# Patient Record
Sex: FEMALE | Race: WHITE | Employment: FULL TIME | URBAN - METROPOLITAN AREA
[De-identification: names, ages, dates, MRNs, and addresses within clinical notes are randomized per-mention and may not be internally consistent; named-entity substitution may affect disease eponyms.]

---

## 2017-01-12 PROBLEM — G89.29 CHRONIC LEFT SHOULDER PAIN: Status: ACTIVE | Noted: 2017-01-12

## 2017-01-12 PROBLEM — M25.512 CHRONIC LEFT SHOULDER PAIN: Status: ACTIVE | Noted: 2017-01-12

## 2017-01-12 PROBLEM — M54.2 NECK PAIN: Status: ACTIVE | Noted: 2017-01-12

## 2017-01-12 PROBLEM — M75.80 ROTATOR CUFF TENDINITIS: Status: ACTIVE | Noted: 2017-01-12

## 2017-01-12 PROBLEM — M75.22 BICEPS TENDINITIS ON LEFT: Status: ACTIVE | Noted: 2017-01-12

## 2017-01-12 PROBLEM — S43.432A SUPERIOR GLENOID LABRUM LESION OF LEFT SHOULDER: Status: ACTIVE | Noted: 2017-01-12

## 2017-01-12 PROBLEM — M19.012 ARTHRITIS OF LEFT ACROMIOCLAVICULAR JOINT: Status: ACTIVE | Noted: 2017-01-12

## 2017-02-13 ENCOUNTER — HOSPITAL ENCOUNTER (OUTPATIENT)
Dept: PHYSICAL THERAPY | Age: 52
Discharge: HOME | End: 2017-02-28
Admitting: ORTHOPAEDIC SURGERY

## 2017-02-14 ENCOUNTER — HOSPITAL ENCOUNTER (OUTPATIENT)
Dept: OTHER | Age: 52
Discharge: HOME | End: 2017-02-14
Attending: ORTHOPAEDIC SURGERY | Admitting: ORTHOPAEDIC SURGERY

## 2017-03-01 ENCOUNTER — HOSPITAL ENCOUNTER (OUTPATIENT)
Dept: PHYSICAL THERAPY | Age: 52
Discharge: HOME | End: 2017-03-02
Admitting: ORTHOPAEDIC SURGERY

## 2017-03-08 ENCOUNTER — OFFICE VISIT (OUTPATIENT)
Dept: FAMILY MEDICINE CLINIC | Age: 52
End: 2017-03-08

## 2017-03-08 VITALS
DIASTOLIC BLOOD PRESSURE: 70 MMHG | BODY MASS INDEX: 38.51 KG/M2 | SYSTOLIC BLOOD PRESSURE: 100 MMHG | HEART RATE: 68 BPM | WEIGHT: 239.6 LBS | TEMPERATURE: 98.1 F | HEIGHT: 66 IN

## 2017-03-08 DIAGNOSIS — R42 VERTIGO: Primary | ICD-10-CM

## 2017-03-08 PROCEDURE — 99213 OFFICE O/P EST LOW 20 MIN: CPT | Performed by: FAMILY MEDICINE

## 2017-03-08 RX ORDER — MECLIZINE HYDROCHLORIDE 25 MG/1
25 TABLET ORAL 3 TIMES DAILY PRN
Qty: 12 TABLET | Refills: 0 | Status: SHIPPED | OUTPATIENT
Start: 2017-03-08 | End: 2017-03-18

## 2017-03-08 ASSESSMENT — ENCOUNTER SYMPTOMS
EYE PAIN: 0
ABDOMINAL PAIN: 0
VOMITING: 1
NAUSEA: 1
SHORTNESS OF BREATH: 0

## 2017-03-21 ENCOUNTER — TELEPHONE (OUTPATIENT)
Dept: FAMILY MEDICINE CLINIC | Age: 52
End: 2017-03-21

## 2017-03-21 RX ORDER — METHYLPREDNISOLONE 4 MG/1
TABLET ORAL
Qty: 1 KIT | Refills: 0 | Status: SHIPPED | OUTPATIENT
Start: 2017-03-21 | End: 2017-03-27

## 2017-03-24 ENCOUNTER — HOSPITAL ENCOUNTER (OUTPATIENT)
Dept: PHYSICAL THERAPY | Age: 52
Discharge: HOME | End: 2017-03-25
Admitting: ORTHOPAEDIC SURGERY

## 2017-03-30 PROBLEM — M75.82 TENDINITIS OF LEFT ROTATOR CUFF: Status: ACTIVE | Noted: 2017-01-12

## 2017-04-12 ENCOUNTER — HOSPITAL ENCOUNTER (OUTPATIENT)
Dept: NON INVASIVE DIAGNOSTICS | Age: 52
Discharge: HOME | End: 2017-04-12
Attending: FAMILY MEDICINE | Admitting: FAMILY MEDICINE

## 2017-04-12 ENCOUNTER — OFFICE VISIT (OUTPATIENT)
Dept: FAMILY MEDICINE CLINIC | Age: 52
End: 2017-04-12

## 2017-04-12 VITALS
DIASTOLIC BLOOD PRESSURE: 84 MMHG | WEIGHT: 232.6 LBS | SYSTOLIC BLOOD PRESSURE: 122 MMHG | BODY MASS INDEX: 37.38 KG/M2 | TEMPERATURE: 97.4 F | HEART RATE: 72 BPM | HEIGHT: 66 IN

## 2017-04-12 DIAGNOSIS — E78.00 PURE HYPERCHOLESTEROLEMIA: ICD-10-CM

## 2017-04-12 DIAGNOSIS — S43.432A SUPERIOR GLENOID LABRUM LESION OF LEFT SHOULDER, INITIAL ENCOUNTER: ICD-10-CM

## 2017-04-12 DIAGNOSIS — M19.012 ARTHRITIS OF LEFT ACROMIOCLAVICULAR JOINT: ICD-10-CM

## 2017-04-12 DIAGNOSIS — Z01.818 PREOP EXAMINATION: ICD-10-CM

## 2017-04-12 DIAGNOSIS — Z01.818 PREOP EXAMINATION: Primary | ICD-10-CM

## 2017-04-12 DIAGNOSIS — M75.82 TENDINITIS OF LEFT ROTATOR CUFF: ICD-10-CM

## 2017-04-12 LAB
EKG ATRIAL RATE: 61 BPM
EKG DIAGNOSIS: NORMAL
EKG P AXIS: 29 DEGREES
EKG P-R INTERVAL: 180 MS
EKG Q-T INTERVAL: 428 MS
EKG QRS DURATION: 84 MS
EKG QTC CALCULATION (BAZETT): 430 MS
EKG R AXIS: 21 DEGREES
EKG T AXIS: 26 DEGREES
EKG VENTRICULAR RATE: 61 BPM

## 2017-04-12 PROCEDURE — 99242 OFF/OP CONSLTJ NEW/EST SF 20: CPT | Performed by: FAMILY MEDICINE

## 2017-04-12 ASSESSMENT — ENCOUNTER SYMPTOMS
ABDOMINAL PAIN: 0
SHORTNESS OF BREATH: 0
RHINORRHEA: 0
NAUSEA: 0
EYE PAIN: 0
DIARRHEA: 0
CHEST TIGHTNESS: 0
SINUS PRESSURE: 0
EYE DISCHARGE: 0
VOMITING: 0
COUGH: 0
WHEEZING: 0
CONSTIPATION: 0
BLOOD IN STOOL: 0
EYE REDNESS: 0
COLOR CHANGE: 0
BACK PAIN: 0

## 2017-04-13 ENCOUNTER — TELEPHONE (OUTPATIENT)
Dept: FAMILY MEDICINE CLINIC | Age: 52
End: 2017-04-13

## 2017-04-24 ENCOUNTER — TELEPHONE (OUTPATIENT)
Dept: ORTHOPEDIC SURGERY | Age: 52
End: 2017-04-24

## 2017-04-25 ENCOUNTER — HOSPITAL ENCOUNTER (OUTPATIENT)
Dept: PREADMISSION TESTING | Age: 52
Discharge: HOME | End: 2017-04-25
Admitting: ORTHOPAEDIC SURGERY

## 2017-04-25 VITALS — HEIGHT: 66 IN | WEIGHT: 232 LBS | BODY MASS INDEX: 37.28 KG/M2

## 2017-04-25 RX ORDER — CHLORHEXIDINE GLUCONATE 0.12 MG/ML
15 RINSE ORAL 2 TIMES DAILY
Status: CANCELLED | OUTPATIENT
Start: 2017-04-25

## 2017-05-05 ENCOUNTER — HOSPITAL ENCOUNTER (OUTPATIENT)
Dept: SURGERY | Age: 52
Discharge: HOME | End: 2017-05-05
Admitting: ORTHOPAEDIC SURGERY

## 2017-05-05 VITALS
OXYGEN SATURATION: 98 % | BODY MASS INDEX: 37.28 KG/M2 | WEIGHT: 232 LBS | TEMPERATURE: 97.4 F | RESPIRATION RATE: 20 BRPM | HEART RATE: 64 BPM | SYSTOLIC BLOOD PRESSURE: 121 MMHG | HEIGHT: 66 IN | DIASTOLIC BLOOD PRESSURE: 76 MMHG

## 2017-05-05 DIAGNOSIS — M19.012 OSTEOARTHRITIS OF LEFT GLENOHUMERAL JOINT: ICD-10-CM

## 2017-05-05 LAB — PREGNANCY, URINE: NEGATIVE

## 2017-05-05 RX ORDER — LABETALOL HYDROCHLORIDE 5 MG/ML
5 INJECTION, SOLUTION INTRAVENOUS EVERY 5 MIN PRN
Status: DISCONTINUED | OUTPATIENT
Start: 2017-05-05 | End: 2017-05-06 | Stop reason: HOSPADM

## 2017-05-05 RX ORDER — MIDAZOLAM HYDROCHLORIDE 1 MG/ML
2 INJECTION INTRAMUSCULAR; INTRAVENOUS
Status: COMPLETED | OUTPATIENT
Start: 2017-05-05 | End: 2017-05-05

## 2017-05-05 RX ORDER — FENTANYL CITRATE 50 UG/ML
25 INJECTION, SOLUTION INTRAMUSCULAR; INTRAVENOUS EVERY 5 MIN PRN
Status: DISCONTINUED | OUTPATIENT
Start: 2017-05-05 | End: 2017-05-06 | Stop reason: HOSPADM

## 2017-05-05 RX ORDER — ONDANSETRON 2 MG/ML
4 INJECTION INTRAMUSCULAR; INTRAVENOUS
Status: COMPLETED | OUTPATIENT
Start: 2017-05-05 | End: 2017-05-05

## 2017-05-05 RX ORDER — SODIUM CHLORIDE, SODIUM LACTATE, POTASSIUM CHLORIDE, CALCIUM CHLORIDE 600; 310; 30; 20 MG/100ML; MG/100ML; MG/100ML; MG/100ML
INJECTION, SOLUTION INTRAVENOUS CONTINUOUS
Status: DISCONTINUED | OUTPATIENT
Start: 2017-05-05 | End: 2017-05-06 | Stop reason: HOSPADM

## 2017-05-05 RX ORDER — SODIUM CHLORIDE 0.9 % (FLUSH) 0.9 %
10 SYRINGE (ML) INJECTION PRN
Status: DISCONTINUED | OUTPATIENT
Start: 2017-05-05 | End: 2017-05-06 | Stop reason: HOSPADM

## 2017-05-05 RX ORDER — MEPERIDINE HYDROCHLORIDE 25 MG/ML
12.5 INJECTION INTRAMUSCULAR; INTRAVENOUS; SUBCUTANEOUS EVERY 5 MIN PRN
Status: DISCONTINUED | OUTPATIENT
Start: 2017-05-05 | End: 2017-05-06 | Stop reason: HOSPADM

## 2017-05-05 RX ORDER — HYDRALAZINE HYDROCHLORIDE 20 MG/ML
5 INJECTION INTRAMUSCULAR; INTRAVENOUS EVERY 5 MIN PRN
Status: DISCONTINUED | OUTPATIENT
Start: 2017-05-05 | End: 2017-05-06 | Stop reason: HOSPADM

## 2017-05-05 RX ORDER — SODIUM CHLORIDE 0.9 % (FLUSH) 0.9 %
10 SYRINGE (ML) INJECTION EVERY 12 HOURS SCHEDULED
Status: DISCONTINUED | OUTPATIENT
Start: 2017-05-05 | End: 2017-05-06 | Stop reason: HOSPADM

## 2017-05-05 RX ORDER — PROMETHAZINE HYDROCHLORIDE 25 MG/1
25 TABLET ORAL EVERY 8 HOURS PRN
Qty: 30 TABLET | Refills: 0 | Status: SHIPPED | OUTPATIENT
Start: 2017-05-05 | End: 2017-05-12

## 2017-05-05 RX ORDER — LIDOCAINE HYDROCHLORIDE 10 MG/ML
1 INJECTION, SOLUTION EPIDURAL; INFILTRATION; INTRACAUDAL; PERINEURAL
Status: ACTIVE | OUTPATIENT
Start: 2017-05-05 | End: 2017-05-05

## 2017-05-05 RX ORDER — ROPIVACAINE HYDROCHLORIDE 5 MG/ML
INJECTION, SOLUTION EPIDURAL; INFILTRATION; PERINEURAL
Status: DISPENSED
Start: 2017-05-05 | End: 2017-05-05

## 2017-05-05 RX ORDER — CHLORHEXIDINE GLUCONATE 0.12 MG/ML
15 RINSE ORAL 2 TIMES DAILY
Status: DISCONTINUED | OUTPATIENT
Start: 2017-05-05 | End: 2017-05-06 | Stop reason: HOSPADM

## 2017-05-05 RX ORDER — SCOLOPAMINE TRANSDERMAL SYSTEM 1 MG/1
1 PATCH, EXTENDED RELEASE TRANSDERMAL ONCE
Status: DISCONTINUED | OUTPATIENT
Start: 2017-05-05 | End: 2017-05-06 | Stop reason: HOSPADM

## 2017-05-05 RX ORDER — FENTANYL CITRATE 50 UG/ML
100 INJECTION, SOLUTION INTRAMUSCULAR; INTRAVENOUS
Status: COMPLETED | OUTPATIENT
Start: 2017-05-05 | End: 2017-05-05

## 2017-05-05 RX ORDER — GLYCOPYRROLATE 0.2 MG/ML
0.2 INJECTION INTRAMUSCULAR; INTRAVENOUS ONCE
Status: COMPLETED | OUTPATIENT
Start: 2017-05-05 | End: 2017-05-05

## 2017-05-05 RX ORDER — SCOLOPAMINE TRANSDERMAL SYSTEM 1 MG/1
PATCH, EXTENDED RELEASE TRANSDERMAL
Status: DISCONTINUED
Start: 2017-05-05 | End: 2017-05-06 | Stop reason: HOSPADM

## 2017-05-05 RX ORDER — OXYCODONE HYDROCHLORIDE AND ACETAMINOPHEN 5; 325 MG/1; MG/1
1 TABLET ORAL EVERY 4 HOURS PRN
Qty: 30 TABLET | Refills: 0 | Status: SHIPPED | OUTPATIENT
Start: 2017-05-05 | End: 2017-05-12

## 2017-05-05 RX ORDER — SODIUM CHLORIDE, SODIUM LACTATE, POTASSIUM CHLORIDE, CALCIUM CHLORIDE 600; 310; 30; 20 MG/100ML; MG/100ML; MG/100ML; MG/100ML
125 INJECTION, SOLUTION INTRAVENOUS CONTINUOUS
Status: DISCONTINUED | OUTPATIENT
Start: 2017-05-05 | End: 2017-05-06 | Stop reason: HOSPADM

## 2017-05-05 RX ORDER — METOCLOPRAMIDE HYDROCHLORIDE 5 MG/ML
10 INJECTION INTRAMUSCULAR; INTRAVENOUS ONCE
Status: COMPLETED | OUTPATIENT
Start: 2017-05-05 | End: 2017-05-05

## 2017-05-05 RX ADMIN — ONDANSETRON 4 MG: 2 INJECTION INTRAMUSCULAR; INTRAVENOUS at 12:08

## 2017-05-05 RX ADMIN — MIDAZOLAM HYDROCHLORIDE 2 MG: 1 INJECTION INTRAMUSCULAR; INTRAVENOUS at 08:29

## 2017-05-05 RX ADMIN — FENTANYL CITRATE 100 MCG: 50 INJECTION, SOLUTION INTRAMUSCULAR; INTRAVENOUS at 08:29

## 2017-05-05 RX ADMIN — SODIUM CHLORIDE, SODIUM LACTATE, POTASSIUM CHLORIDE, CALCIUM CHLORIDE 125 ML/HR: 600; 310; 30; 20 INJECTION, SOLUTION INTRAVENOUS at 08:42

## 2017-05-05 RX ADMIN — METOCLOPRAMIDE HYDROCHLORIDE 10 MG: 5 INJECTION INTRAMUSCULAR; INTRAVENOUS at 08:30

## 2017-05-05 RX ADMIN — GLYCOPYRROLATE 0.2 MG: 0.2 INJECTION INTRAMUSCULAR; INTRAVENOUS at 08:30

## 2017-05-05 ASSESSMENT — PAIN - FUNCTIONAL ASSESSMENT
PAIN_FUNCTIONAL_ASSESSMENT: 0-10

## 2017-05-05 ASSESSMENT — PAIN DESCRIPTION - DESCRIPTORS: DESCRIPTORS: DISCOMFORT

## 2017-05-05 ASSESSMENT — PAIN SCALES - GENERAL
PAINLEVEL_OUTOF10: 2
PAINLEVEL_OUTOF10: 0
PAINLEVEL_OUTOF10: 0

## 2017-05-08 ENCOUNTER — TELEPHONE (OUTPATIENT)
Dept: ORTHOPEDIC SURGERY | Age: 52
End: 2017-05-08

## 2017-05-08 ENCOUNTER — HOSPITAL ENCOUNTER (OUTPATIENT)
Dept: PHYSICAL THERAPY | Age: 52
Discharge: HOME | End: 2017-05-31
Attending: ORTHOPAEDIC SURGERY | Admitting: ORTHOPAEDIC SURGERY

## 2017-05-08 ASSESSMENT — PAIN SCALES - GENERAL: PAINLEVEL_OUTOF10: 2

## 2017-05-08 ASSESSMENT — PAIN DESCRIPTION - LOCATION: LOCATION: SHOULDER

## 2017-05-08 ASSESSMENT — PAIN DESCRIPTION - DESCRIPTORS: DESCRIPTORS: BURNING;ACHING

## 2017-05-08 ASSESSMENT — PAIN DESCRIPTION - PAIN TYPE: TYPE: SURGICAL PAIN

## 2017-05-08 ASSESSMENT — PAIN DESCRIPTION - ORIENTATION: ORIENTATION: LEFT

## 2017-05-15 ENCOUNTER — HOSPITAL ENCOUNTER (OUTPATIENT)
Dept: GENERAL RADIOLOGY | Facility: MEDICAL CENTER | Age: 52
Discharge: HOME | End: 2017-05-15
Attending: ORTHOPAEDIC SURGERY | Admitting: ORTHOPAEDIC SURGERY

## 2017-05-15 ENCOUNTER — OFFICE VISIT (OUTPATIENT)
Dept: ORTHOPEDIC SURGERY | Age: 52
End: 2017-05-15

## 2017-05-15 VITALS
HEART RATE: 76 BPM | DIASTOLIC BLOOD PRESSURE: 86 MMHG | BODY MASS INDEX: 36.96 KG/M2 | WEIGHT: 230 LBS | HEIGHT: 66 IN | SYSTOLIC BLOOD PRESSURE: 121 MMHG

## 2017-05-15 DIAGNOSIS — M25.562 CHRONIC PAIN OF LEFT KNEE: ICD-10-CM

## 2017-05-15 DIAGNOSIS — M17.12 PRIMARY OSTEOARTHRITIS OF LEFT KNEE: ICD-10-CM

## 2017-05-15 DIAGNOSIS — S83.232A COMPLEX TEAR OF MEDIAL MENISCUS OF LEFT KNEE AS CURRENT INJURY, INITIAL ENCOUNTER: ICD-10-CM

## 2017-05-15 DIAGNOSIS — G89.29 CHRONIC PAIN OF LEFT KNEE: ICD-10-CM

## 2017-05-15 DIAGNOSIS — Z98.890 S/P ARTHROSCOPY OF SHOULDER: ICD-10-CM

## 2017-05-15 DIAGNOSIS — M22.42 CHONDROMALACIA PATELLAE OF LEFT KNEE: ICD-10-CM

## 2017-05-15 DIAGNOSIS — M25.562 CHRONIC PAIN OF LEFT KNEE: Primary | ICD-10-CM

## 2017-05-15 DIAGNOSIS — M79.4 HYPERTROPHY, FAT PAD, INFRAPATELLAR: ICD-10-CM

## 2017-05-15 DIAGNOSIS — E66.9 OBESITY (BMI 30-39.9): ICD-10-CM

## 2017-05-15 DIAGNOSIS — G89.29 CHRONIC PAIN OF LEFT KNEE: Primary | ICD-10-CM

## 2017-05-15 PROCEDURE — 99214 OFFICE O/P EST MOD 30 MIN: CPT | Performed by: ORTHOPAEDIC SURGERY

## 2017-05-15 PROCEDURE — 73562 X-RAY EXAM OF KNEE 3: CPT | Performed by: ORTHOPAEDIC SURGERY

## 2017-05-15 RX ORDER — MELOXICAM 15 MG/1
15 TABLET ORAL DAILY
Qty: 30 TABLET | Refills: 2 | Status: SHIPPED | OUTPATIENT
Start: 2017-05-15 | End: 2017-05-18 | Stop reason: SDUPTHER

## 2017-05-25 ENCOUNTER — TELEPHONE (OUTPATIENT)
Dept: FAMILY MEDICINE CLINIC | Age: 52
End: 2017-05-25

## 2017-05-25 DIAGNOSIS — E78.00 PURE HYPERCHOLESTEROLEMIA: Primary | ICD-10-CM

## 2017-05-26 ENCOUNTER — OFFICE VISIT (OUTPATIENT)
Dept: ORTHOPEDIC SURGERY | Age: 52
End: 2017-05-26

## 2017-05-26 ENCOUNTER — TELEPHONE (OUTPATIENT)
Dept: ORTHOPEDIC SURGERY | Age: 52
End: 2017-05-26

## 2017-05-26 VITALS
HEIGHT: 66 IN | DIASTOLIC BLOOD PRESSURE: 78 MMHG | SYSTOLIC BLOOD PRESSURE: 121 MMHG | WEIGHT: 230 LBS | BODY MASS INDEX: 36.96 KG/M2 | HEART RATE: 74 BPM

## 2017-05-26 DIAGNOSIS — S83.232A COMPLEX TEAR OF MEDIAL MENISCUS OF LEFT KNEE AS CURRENT INJURY, INITIAL ENCOUNTER: ICD-10-CM

## 2017-05-26 DIAGNOSIS — M25.562 CHRONIC PAIN OF LEFT KNEE: Primary | ICD-10-CM

## 2017-05-26 DIAGNOSIS — M79.4 HYPERTROPHY, FAT PAD, INFRAPATELLAR: ICD-10-CM

## 2017-05-26 DIAGNOSIS — G89.29 CHRONIC PAIN OF LEFT KNEE: Primary | ICD-10-CM

## 2017-05-26 DIAGNOSIS — M22.42 CHONDROMALACIA PATELLAE OF LEFT KNEE: ICD-10-CM

## 2017-05-26 DIAGNOSIS — M17.12 PRIMARY OSTEOARTHRITIS OF LEFT KNEE: ICD-10-CM

## 2017-05-26 PROCEDURE — 99214 OFFICE O/P EST MOD 30 MIN: CPT | Performed by: ORTHOPAEDIC SURGERY

## 2017-05-30 ENCOUNTER — HOSPITAL ENCOUNTER (OUTPATIENT)
Dept: PREADMISSION TESTING | Age: 52
Discharge: HOME | End: 2017-05-30
Admitting: ORTHOPAEDIC SURGERY

## 2017-05-30 ENCOUNTER — TELEPHONE (OUTPATIENT)
Dept: ORTHOPEDIC SURGERY | Age: 52
End: 2017-05-30

## 2017-05-30 ENCOUNTER — OFFICE VISIT (OUTPATIENT)
Dept: FAMILY MEDICINE CLINIC | Age: 52
End: 2017-05-30

## 2017-05-30 VITALS
BODY MASS INDEX: 37.9 KG/M2 | WEIGHT: 235.8 LBS | HEIGHT: 66 IN | DIASTOLIC BLOOD PRESSURE: 84 MMHG | TEMPERATURE: 98.7 F | HEART RATE: 72 BPM | SYSTOLIC BLOOD PRESSURE: 126 MMHG

## 2017-05-30 VITALS — WEIGHT: 230 LBS | BODY MASS INDEX: 36.96 KG/M2 | HEIGHT: 66 IN

## 2017-05-30 DIAGNOSIS — S83.232A COMPLEX TEAR OF MEDIAL MENISCUS OF LEFT KNEE AS CURRENT INJURY, INITIAL ENCOUNTER: ICD-10-CM

## 2017-05-30 DIAGNOSIS — Z01.818 PREOP EXAMINATION: Primary | ICD-10-CM

## 2017-05-30 DIAGNOSIS — M22.42 CHONDROMALACIA PATELLAE OF LEFT KNEE: ICD-10-CM

## 2017-05-30 DIAGNOSIS — G89.29 CHRONIC PAIN OF LEFT KNEE: ICD-10-CM

## 2017-05-30 DIAGNOSIS — M25.562 CHRONIC PAIN OF LEFT KNEE: ICD-10-CM

## 2017-05-30 PROCEDURE — 99242 OFF/OP CONSLTJ NEW/EST SF 20: CPT | Performed by: FAMILY MEDICINE

## 2017-05-30 RX ORDER — CHLORHEXIDINE GLUCONATE 0.12 MG/ML
15 RINSE ORAL 2 TIMES DAILY
Status: CANCELLED | OUTPATIENT
Start: 2017-05-30

## 2017-05-30 ASSESSMENT — ENCOUNTER SYMPTOMS
COLOR CHANGE: 0
NAUSEA: 0
CHEST TIGHTNESS: 0
SHORTNESS OF BREATH: 0
EYE PAIN: 0
COUGH: 0
WHEEZING: 0
SINUS PRESSURE: 0
VOMITING: 0
DIARRHEA: 0
ABDOMINAL PAIN: 0
EYE REDNESS: 0
EYE DISCHARGE: 0
RHINORRHEA: 0
CONSTIPATION: 0
BLOOD IN STOOL: 0

## 2017-05-31 ENCOUNTER — HOSPITAL ENCOUNTER (OUTPATIENT)
Dept: PREADMISSION TESTING | Age: 52
Discharge: HOME | End: 2017-05-31
Attending: ORTHOPAEDIC SURGERY | Admitting: ORTHOPAEDIC SURGERY

## 2017-05-31 VITALS
WEIGHT: 235 LBS | OXYGEN SATURATION: 98 % | TEMPERATURE: 98.5 F | HEART RATE: 65 BPM | HEIGHT: 65 IN | RESPIRATION RATE: 18 BRPM | BODY MASS INDEX: 39.15 KG/M2 | DIASTOLIC BLOOD PRESSURE: 78 MMHG | SYSTOLIC BLOOD PRESSURE: 140 MMHG

## 2017-05-31 LAB — PREGNANCY, URINE: NEGATIVE

## 2017-05-31 RX ORDER — LABETALOL HYDROCHLORIDE 5 MG/ML
5 INJECTION, SOLUTION INTRAVENOUS EVERY 10 MIN PRN
Status: DISCONTINUED | OUTPATIENT
Start: 2017-05-31 | End: 2017-06-01 | Stop reason: HOSPADM

## 2017-05-31 RX ORDER — MEPERIDINE HYDROCHLORIDE 25 MG/ML
12.5 INJECTION INTRAMUSCULAR; INTRAVENOUS; SUBCUTANEOUS EVERY 5 MIN PRN
Status: DISCONTINUED | OUTPATIENT
Start: 2017-05-31 | End: 2017-06-01 | Stop reason: HOSPADM

## 2017-05-31 RX ORDER — CHLORHEXIDINE GLUCONATE 0.12 MG/ML
15 RINSE ORAL 2 TIMES DAILY
Status: DISCONTINUED | OUTPATIENT
Start: 2017-05-31 | End: 2017-06-01 | Stop reason: HOSPADM

## 2017-05-31 RX ORDER — HYDRALAZINE HYDROCHLORIDE 20 MG/ML
5 INJECTION INTRAMUSCULAR; INTRAVENOUS EVERY 10 MIN PRN
Status: DISCONTINUED | OUTPATIENT
Start: 2017-05-31 | End: 2017-06-01 | Stop reason: HOSPADM

## 2017-05-31 RX ORDER — SODIUM CHLORIDE 0.9 % (FLUSH) 0.9 %
10 SYRINGE (ML) INJECTION EVERY 12 HOURS SCHEDULED
Status: DISCONTINUED | OUTPATIENT
Start: 2017-05-31 | End: 2017-06-01 | Stop reason: HOSPADM

## 2017-05-31 RX ORDER — SODIUM CHLORIDE, SODIUM LACTATE, POTASSIUM CHLORIDE, CALCIUM CHLORIDE 600; 310; 30; 20 MG/100ML; MG/100ML; MG/100ML; MG/100ML
INJECTION, SOLUTION INTRAVENOUS CONTINUOUS
Status: DISCONTINUED | OUTPATIENT
Start: 2017-05-31 | End: 2017-06-01 | Stop reason: HOSPADM

## 2017-05-31 RX ORDER — SODIUM CHLORIDE 0.9 % (FLUSH) 0.9 %
10 SYRINGE (ML) INJECTION PRN
Status: DISCONTINUED | OUTPATIENT
Start: 2017-05-31 | End: 2017-06-01 | Stop reason: HOSPADM

## 2017-05-31 RX ORDER — FENTANYL CITRATE 50 UG/ML
25 INJECTION, SOLUTION INTRAMUSCULAR; INTRAVENOUS EVERY 5 MIN PRN
Status: DISCONTINUED | OUTPATIENT
Start: 2017-05-31 | End: 2017-06-01 | Stop reason: HOSPADM

## 2017-05-31 RX ORDER — ONDANSETRON 2 MG/ML
4 INJECTION INTRAMUSCULAR; INTRAVENOUS ONCE
Status: COMPLETED | OUTPATIENT
Start: 2017-05-31 | End: 2017-05-31

## 2017-05-31 RX ORDER — DIPHENHYDRAMINE HYDROCHLORIDE 50 MG/ML
12.5 INJECTION INTRAMUSCULAR; INTRAVENOUS
Status: ACTIVE | OUTPATIENT
Start: 2017-05-31 | End: 2017-05-31

## 2017-05-31 RX ORDER — OXYCODONE HYDROCHLORIDE AND ACETAMINOPHEN 5; 325 MG/1; MG/1
1 TABLET ORAL
Status: COMPLETED | OUTPATIENT
Start: 2017-05-31 | End: 2017-05-31

## 2017-05-31 RX ORDER — SCOLOPAMINE TRANSDERMAL SYSTEM 1 MG/1
1 PATCH, EXTENDED RELEASE TRANSDERMAL
Status: DISCONTINUED | OUTPATIENT
Start: 2017-05-31 | End: 2017-06-01 | Stop reason: HOSPADM

## 2017-05-31 RX ORDER — PROMETHAZINE HYDROCHLORIDE 25 MG/1
25 TABLET ORAL EVERY 8 HOURS PRN
Qty: 30 TABLET | Refills: 0 | Status: SHIPPED | OUTPATIENT
Start: 2017-05-31 | End: 2017-06-07

## 2017-05-31 RX ORDER — PROMETHAZINE HYDROCHLORIDE 25 MG/ML
6.25 INJECTION, SOLUTION INTRAMUSCULAR; INTRAVENOUS
Status: ACTIVE | OUTPATIENT
Start: 2017-05-31 | End: 2017-05-31

## 2017-05-31 RX ORDER — ONDANSETRON 2 MG/ML
4 INJECTION INTRAMUSCULAR; INTRAVENOUS
Status: ACTIVE | OUTPATIENT
Start: 2017-05-31 | End: 2017-05-31

## 2017-05-31 RX ORDER — OXYCODONE HYDROCHLORIDE AND ACETAMINOPHEN 5; 325 MG/1; MG/1
1 TABLET ORAL EVERY 4 HOURS PRN
Qty: 30 TABLET | Refills: 0 | Status: SHIPPED | OUTPATIENT
Start: 2017-05-31 | End: 2017-06-07

## 2017-05-31 RX ADMIN — Medication 0.25 MG: at 14:25

## 2017-05-31 RX ADMIN — SODIUM CHLORIDE, SODIUM LACTATE, POTASSIUM CHLORIDE, CALCIUM CHLORIDE: 600; 310; 30; 20 INJECTION, SOLUTION INTRAVENOUS at 12:21

## 2017-05-31 RX ADMIN — OXYCODONE HYDROCHLORIDE AND ACETAMINOPHEN 1 TABLET: 5; 325 TABLET ORAL at 15:05

## 2017-05-31 RX ADMIN — Medication 0.25 MG: at 14:32

## 2017-05-31 RX ADMIN — ONDANSETRON 4 MG: 2 INJECTION INTRAMUSCULAR; INTRAVENOUS at 12:21

## 2017-05-31 ASSESSMENT — PAIN SCALES - GENERAL
PAINLEVEL_OUTOF10: 5
PAINLEVEL_OUTOF10: 3
PAINLEVEL_OUTOF10: 2
PAINLEVEL_OUTOF10: 5
PAINLEVEL_OUTOF10: 2
PAINLEVEL_OUTOF10: 3
PAINLEVEL_OUTOF10: 6

## 2017-05-31 ASSESSMENT — PAIN DESCRIPTION - LOCATION
LOCATION: KNEE
LOCATION: KNEE

## 2017-05-31 ASSESSMENT — PAIN DESCRIPTION - PAIN TYPE
TYPE: SURGICAL PAIN
TYPE: ACUTE PAIN

## 2017-05-31 ASSESSMENT — PAIN DESCRIPTION - DESCRIPTORS
DESCRIPTORS: ACHING
DESCRIPTORS: ACHING

## 2017-05-31 ASSESSMENT — PAIN DESCRIPTION - FREQUENCY: FREQUENCY: CONTINUOUS

## 2017-05-31 ASSESSMENT — PAIN DESCRIPTION - ORIENTATION: ORIENTATION: LEFT

## 2017-06-05 DIAGNOSIS — E78.00 PURE HYPERCHOLESTEROLEMIA: ICD-10-CM

## 2017-06-05 LAB
A/G RATIO: 1.6 (ref 1.1–2.2)
ALBUMIN SERPL-MCNC: 4 G/DL (ref 3.4–5)
ALP BLD-CCNC: 57 U/L (ref 40–129)
ALT SERPL-CCNC: 9 U/L (ref 10–40)
ANION GAP SERPL CALCULATED.3IONS-SCNC: 15 MMOL/L (ref 3–16)
AST SERPL-CCNC: 11 U/L (ref 15–37)
BILIRUB SERPL-MCNC: 0.5 MG/DL (ref 0–1)
BUN BLDV-MCNC: 18 MG/DL (ref 7–20)
CALCIUM SERPL-MCNC: 9.3 MG/DL (ref 8.3–10.6)
CHLORIDE BLD-SCNC: 99 MMOL/L (ref 99–110)
CHOLESTEROL, TOTAL: 188 MG/DL (ref 0–199)
CO2: 26 MMOL/L (ref 21–32)
CREAT SERPL-MCNC: 0.6 MG/DL (ref 0.6–1.1)
GFR AFRICAN AMERICAN: >60
GFR NON-AFRICAN AMERICAN: >60
GLOBULIN: 2.5 G/DL
GLUCOSE BLD-MCNC: 84 MG/DL (ref 70–99)
HDLC SERPL-MCNC: 48 MG/DL (ref 40–60)
LDL CHOLESTEROL CALCULATED: 116 MG/DL
POTASSIUM SERPL-SCNC: 4.4 MMOL/L (ref 3.5–5.1)
SODIUM BLD-SCNC: 140 MMOL/L (ref 136–145)
TOTAL PROTEIN: 6.5 G/DL (ref 6.4–8.2)
TRIGL SERPL-MCNC: 119 MG/DL (ref 0–150)
VLDLC SERPL CALC-MCNC: 24 MG/DL

## 2017-06-06 DIAGNOSIS — Z98.890 S/P LEFT KNEE ARTHROSCOPY: ICD-10-CM

## 2017-06-07 ENCOUNTER — OFFICE VISIT (OUTPATIENT)
Dept: FAMILY MEDICINE CLINIC | Age: 52
End: 2017-06-07

## 2017-06-07 VITALS
HEART RATE: 76 BPM | WEIGHT: 232.6 LBS | HEIGHT: 65 IN | SYSTOLIC BLOOD PRESSURE: 134 MMHG | TEMPERATURE: 98.1 F | DIASTOLIC BLOOD PRESSURE: 84 MMHG | BODY MASS INDEX: 38.75 KG/M2

## 2017-06-07 DIAGNOSIS — E78.00 PURE HYPERCHOLESTEROLEMIA: ICD-10-CM

## 2017-06-07 DIAGNOSIS — Z00.00 ROUTINE GENERAL MEDICAL EXAMINATION AT A HEALTH CARE FACILITY: Primary | ICD-10-CM

## 2017-06-07 PROCEDURE — 99396 PREV VISIT EST AGE 40-64: CPT | Performed by: FAMILY MEDICINE

## 2017-06-07 ASSESSMENT — ENCOUNTER SYMPTOMS
RHINORRHEA: 0
SHORTNESS OF BREATH: 0
EYE PAIN: 0
ABDOMINAL PAIN: 0
SINUS PRESSURE: 0
NAUSEA: 0
EYE DISCHARGE: 0
EYE REDNESS: 0
DIARRHEA: 0
CONSTIPATION: 0
CHEST TIGHTNESS: 0
BLOOD IN STOOL: 0
WHEEZING: 0
COUGH: 0
VOMITING: 0

## 2017-06-09 ENCOUNTER — OFFICE VISIT (OUTPATIENT)
Dept: ORTHOPEDIC SURGERY | Age: 52
End: 2017-06-09

## 2017-06-09 VITALS
DIASTOLIC BLOOD PRESSURE: 78 MMHG | SYSTOLIC BLOOD PRESSURE: 117 MMHG | BODY MASS INDEX: 38.75 KG/M2 | HEIGHT: 65 IN | HEART RATE: 67 BPM | WEIGHT: 232.59 LBS

## 2017-06-09 DIAGNOSIS — Z98.890 S/P LEFT KNEE ARTHROSCOPY: Primary | ICD-10-CM

## 2017-06-09 PROBLEM — S83.232A COMPLEX TEAR OF MEDIAL MENISCUS OF LEFT KNEE AS CURRENT INJURY: Status: RESOLVED | Noted: 2017-05-15 | Resolved: 2017-06-09

## 2017-06-09 PROCEDURE — 99024 POSTOP FOLLOW-UP VISIT: CPT | Performed by: PHYSICIAN ASSISTANT

## 2017-06-09 RX ORDER — OXYCODONE HYDROCHLORIDE AND ACETAMINOPHEN 5; 325 MG/1; MG/1
1 TABLET ORAL EVERY 4 HOURS PRN
COMMUNITY
End: 2017-07-14

## 2017-07-14 ENCOUNTER — OFFICE VISIT (OUTPATIENT)
Dept: ORTHOPEDIC SURGERY | Age: 52
End: 2017-07-14

## 2017-07-14 VITALS
HEIGHT: 65 IN | SYSTOLIC BLOOD PRESSURE: 112 MMHG | DIASTOLIC BLOOD PRESSURE: 77 MMHG | WEIGHT: 232 LBS | BODY MASS INDEX: 38.65 KG/M2 | HEART RATE: 70 BPM

## 2017-07-14 DIAGNOSIS — Z98.890 S/P LEFT KNEE ARTHROSCOPY: Primary | ICD-10-CM

## 2017-07-14 PROCEDURE — 99024 POSTOP FOLLOW-UP VISIT: CPT | Performed by: PHYSICIAN ASSISTANT

## 2017-07-27 PROBLEM — M75.22 BICEPS TENDINITIS ON LEFT: Status: ACTIVE | Noted: 2017-07-27

## 2017-07-27 PROBLEM — M19.012 ARTHRITIS OF LEFT ACROMIOCLAVICULAR JOINT: Status: ACTIVE | Noted: 2017-07-27

## 2017-07-27 PROBLEM — M75.82 TENDINITIS OF LEFT ROTATOR CUFF: Status: ACTIVE | Noted: 2017-07-27

## 2017-08-03 RX ORDER — SIMVASTATIN 20 MG
TABLET ORAL
Qty: 90 TABLET | Refills: 3 | Status: SHIPPED | OUTPATIENT
Start: 2017-08-03 | End: 2018-06-18 | Stop reason: SDUPTHER

## 2018-02-15 ENCOUNTER — OFFICE VISIT (OUTPATIENT)
Dept: GYNECOLOGY | Age: 53
End: 2018-02-15

## 2018-02-15 VITALS
SYSTOLIC BLOOD PRESSURE: 113 MMHG | BODY MASS INDEX: 38.41 KG/M2 | WEIGHT: 239 LBS | DIASTOLIC BLOOD PRESSURE: 78 MMHG | HEIGHT: 66 IN | RESPIRATION RATE: 17 BRPM | HEART RATE: 75 BPM | TEMPERATURE: 98 F

## 2018-02-15 DIAGNOSIS — Z01.419 WELL WOMAN EXAM WITH ROUTINE GYNECOLOGICAL EXAM: Primary | ICD-10-CM

## 2018-02-15 DIAGNOSIS — N95.1 PERIMENOPAUSE: ICD-10-CM

## 2018-02-15 PROCEDURE — 99396 PREV VISIT EST AGE 40-64: CPT | Performed by: OBSTETRICS & GYNECOLOGY

## 2018-02-15 RX ORDER — ESTRADIOL 0.1 MG/D
1 FILM, EXTENDED RELEASE TRANSDERMAL
Qty: 8 PATCH | Refills: 3 | Status: SHIPPED | OUTPATIENT
Start: 2018-02-15 | End: 2018-06-26 | Stop reason: SDUPTHER

## 2018-02-18 ASSESSMENT — ENCOUNTER SYMPTOMS
GASTROINTESTINAL NEGATIVE: 1
EYES NEGATIVE: 1
RESPIRATORY NEGATIVE: 1

## 2018-02-19 LAB
HPV COMMENT: NORMAL
HPV TYPE 16: NOT DETECTED
HPV TYPE 18: NOT DETECTED
HPVOH (OTHER TYPES): NOT DETECTED

## 2018-02-19 NOTE — PROGRESS NOTES
Subjective:      Patient ID: Anabel Paz is a 45 y.o. female. Patient is here for annual. Patient interested in HRT now. Review of Systems   Constitutional: Negative. HENT: Negative. Eyes: Negative. Respiratory: Negative. Cardiovascular: Negative. Gastrointestinal: Negative. Genitourinary: Negative. Musculoskeletal: Negative. Skin: Negative. Neurological: Negative. Psychiatric/Behavioral: Negative.       Date of Birth 1965  Past Medical History:   Diagnosis Date    Allergic rhinitis     Hyperlipidemia     Medial meniscus tear     PONV (postoperative nausea and vomiting)     Scop patch worked well     Past Surgical History:   Procedure Laterality Date    FOOT SURGERY  2015    rt tendon repair    KNEE ARTHROSCOPY Left 2017    OTHER SURGICAL HISTORY Right 2015    Secondary repair of peroneus brevis tendon tear R    SHOULDER SURGERY Left 2017    TUBAL LIGATION       OB History    Para Term  AB Living   3 3 3     3   SAB TAB Ectopic Molar Multiple Live Births             3      # Outcome Date GA Lbr Jimmie/2nd Weight Sex Delivery Anes PTL Lv   3 Term 44 37w0d   F Vag-Spont   WILL   2 Term 10 37w0d   F Vag-Spont   WILL   1 Term 78 37w0d   M Vag-Spont   WILL        Social History     Social History    Marital status:      Spouse name: N/A    Number of children: 3    Years of education: N/A     Occupational History    RN      Social History Main Topics    Smoking status: Never Smoker    Smokeless tobacco: Never Used    Alcohol use No      Comment: rarely    Drug use: No    Sexual activity: Yes     Partners: Male     Other Topics Concern    Not on file     Social History Narrative    No narrative on file     No Known Allergies  Outpatient Prescriptions Marked as Taking for the 2/15/18 encounter (Office Visit) with Darell Daniels MD   Medication Sig Dispense Refill    estradiol (MINIVELLE) 0.1 MG/24HR Place 1 and normal heart sounds. Exam reveals no gallop and no friction rub. No murmur heard. Pulmonary/Chest: Effort normal and breath sounds normal. No respiratory distress. She has no wheezes. She has no rales. Abdominal: Soft. Bowel sounds are normal. She exhibits no distension and no mass. There is no hepatomegaly. There is no tenderness. There is no rebound and no guarding. No hernia. Genitourinary: Rectum normal, vagina normal and uterus normal. Rectal exam shows no external hemorrhoid, no internal hemorrhoid, no fissure, no mass, no tenderness and guaiac negative stool. No breast swelling, tenderness, discharge or bleeding. There is no rash, tenderness, lesion or injury on the right labia. There is no rash, tenderness, lesion or injury on the left labia. Uterus is not deviated, not enlarged, not fixed and not tender. Cervix exhibits no motion tenderness, no discharge and no friability. Right adnexum displays no mass, no tenderness and no fullness. Left adnexum displays no mass, no tenderness and no fullness. No erythema, tenderness or bleeding in the vagina. No foreign body in the vagina. No signs of injury around the vagina. No vaginal discharge found. Genitourinary Comments: Normal urethral meatus, nl urethra, nl bladder. Musculoskeletal: Normal range of motion. She exhibits no edema or tenderness. Lymphadenopathy:     She has no cervical adenopathy. Right: No inguinal adenopathy present. Left: No inguinal adenopathy present. Neurological: She is alert and oriented to person, place, and time. She has normal reflexes. Skin: Skin is warm and dry. No rash noted. She is not diaphoretic. No erythema. Psychiatric: She has a normal mood and affect. Her behavior is normal. Judgment and thought content normal.       Assessment:      1. Annual  2. Menopause/perimenopause      Plan:          1. Pap, calcium, exercise, mammogram  2. Discussed with patient about HRT.  Will try cyclic HRT, SE

## 2018-03-14 PROBLEM — M54.2 NECK PAIN: Status: RESOLVED | Noted: 2017-01-12 | Resolved: 2018-03-14

## 2018-03-14 PROBLEM — Z01.818 PREOP EXAMINATION: Status: RESOLVED | Noted: 2017-05-30 | Resolved: 2018-03-14

## 2018-03-15 ENCOUNTER — OFFICE VISIT (OUTPATIENT)
Dept: FAMILY MEDICINE CLINIC | Age: 53
End: 2018-03-15

## 2018-03-15 VITALS
BODY MASS INDEX: 38.09 KG/M2 | RESPIRATION RATE: 16 BRPM | HEART RATE: 75 BPM | DIASTOLIC BLOOD PRESSURE: 88 MMHG | OXYGEN SATURATION: 98 % | WEIGHT: 237 LBS | SYSTOLIC BLOOD PRESSURE: 138 MMHG | HEIGHT: 66 IN

## 2018-03-15 DIAGNOSIS — M15.9 PRIMARY OSTEOARTHRITIS INVOLVING MULTIPLE JOINTS: ICD-10-CM

## 2018-03-15 DIAGNOSIS — E78.2 MIXED HYPERLIPIDEMIA: ICD-10-CM

## 2018-03-15 DIAGNOSIS — E66.9 OBESITY (BMI 30-39.9): Primary | ICD-10-CM

## 2018-03-15 DIAGNOSIS — Z82.49 FH: MI (MYOCARDIAL INFARCTION): ICD-10-CM

## 2018-03-15 DIAGNOSIS — Z12.11 SCREENING FOR COLON CANCER: ICD-10-CM

## 2018-03-15 PROBLEM — G89.29 CHRONIC PAIN OF LEFT KNEE: Status: RESOLVED | Noted: 2017-05-15 | Resolved: 2018-03-15

## 2018-03-15 PROBLEM — M25.562 CHRONIC PAIN OF LEFT KNEE: Status: RESOLVED | Noted: 2017-05-15 | Resolved: 2018-03-15

## 2018-03-15 PROCEDURE — 99213 OFFICE O/P EST LOW 20 MIN: CPT | Performed by: INTERNAL MEDICINE

## 2018-03-15 RX ORDER — FLUTICASONE PROPIONATE 50 MCG
1 SPRAY, SUSPENSION (ML) NASAL DAILY
Qty: 1 BOTTLE | Refills: 3 | Status: SHIPPED | OUTPATIENT
Start: 2018-03-15 | End: 2019-07-17 | Stop reason: SDUPTHER

## 2018-03-15 RX ORDER — ACETAMINOPHEN 500 MG
TABLET ORAL
Qty: 120 TABLET | Refills: 3 | Status: ON HOLD | OUTPATIENT
Start: 2018-03-15 | End: 2021-03-03 | Stop reason: HOSPADM

## 2018-03-15 RX ORDER — MELOXICAM 15 MG/1
TABLET ORAL
Qty: 30 TABLET | Refills: 5 | Status: SHIPPED | OUTPATIENT
Start: 2018-03-15 | End: 2019-04-29

## 2018-03-15 ASSESSMENT — PATIENT HEALTH QUESTIONNAIRE - PHQ9
1. LITTLE INTEREST OR PLEASURE IN DOING THINGS: 0
SUM OF ALL RESPONSES TO PHQ9 QUESTIONS 1 & 2: 0
2. FEELING DOWN, DEPRESSED OR HOPELESS: 0
SUM OF ALL RESPONSES TO PHQ QUESTIONS 1-9: 0

## 2018-03-15 NOTE — PROGRESS NOTES
HPI: Herb Greene presents to establish care. History osteoarthritis. Follows with orthopedist has had shoulder surgeries in the past as well as knee and ankle surgery. BMI is 40. Family history of myocardial infarction. Denies any chest pain palpitations. Never had any functional studies. Is on aspirin and cholesterol medication. No prediabetes. Does not smoke.   no breast Due mamgram.  No std concerns. Taking estrogen replacement with GYN. BMI elevated. Is down 20 pounds with exercise and diet. Rare GE reflux. No known snoring or apnea. No somnolence. Positive osteoarthritis of the knees. No prediabetes. Positive hyperlipidemia    PMH:  , tubal ligarion, shoulder surgery, knee arthroscopy, tendon repair        SH:  son 34 at home ( 2 outside). 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 No alcohol no drugs exercises weekly. Has a . No STD concerns. Is ITP nurse    FH    Maternal aunt ovarian cancer?    + HTN, prediabetic and morbid obesity, drowning, ALS father,  sleep apnea and mother     -colon, depression, PGM depression requiring triple therapy    Review of systems: Up-to-date with eye exams. Dental exams up-to-date. No concussions or seizures. Denies any wheezing pneumonias or abnormal chest x-rays other than one episode of bronchiolitis when she worked at [employer]. No chest pain or palpitations. No syncope. No breast lumps or abnormalities. Is due her mammogram area rare GE reflux. No bloody stools kidney stones stress in his intolerance of urine. Denies any abnormal Pap smears. Positive arthralgias. Sees orthopedist. Has had a recent knee injection. Skin cancers. Denies any depression or anxiety.  No known sleep apnea    12 point ROS reviewed and negative other than mentioned above  No Known Allergies    Outpatient Prescriptions Marked as Taking for the 3/15/18 encounter (Office Visit) with Amanda Norwood MD   Medication Sig Dispense Refill    estradiol ([place]) 0.1 MG/24HR Place 1 patch onto the skin Twice a Week 8 patch 3    progesterone (PROMETRIUM) 200 MG capsule Take 1 capsule by mouth nightly 10th-19th every month 30 capsule 3    simvastatin (ZOCOR) 20 MG tablet TAKE ONE TABLET BY MOUTH EVERY EVENING 90 tablet 3    aspirin 81 MG tablet Take 81 mg by mouth daily      omeprazole (PRILOSEC OTC) 20 MG tablet Take 20 mg by mouth daily as needed.       cetirizine (ZYRTEC) 10 MG tablet Take 10 mg by mouth nightly                Past Medical History:   Diagnosis Date    Allergic rhinitis     Hyperlipidemia     Medial meniscus tear     PONV (postoperative nausea and vomiting)     Scop patch worked well       Past Surgical History:   Procedure Laterality Date    FOOT SURGERY  4/2015    rt tendon repair    KNEE ARTHROSCOPY Left 05/31/2017    OTHER SURGICAL HISTORY Right 4/22/2015    Secondary repair of peroneus brevis tendon tear R    SHOULDER SURGERY Left 05/05/2017    TUBAL LIGATION  1993         Social History     Social History    Marital status:      Spouse name: N/A    Number of children: 3    Years of education: N/A     Occupational History    RN      Social History Main Topics    Smoking status: Never Smoker    Smokeless tobacco: Never Used    Alcohol use No      Comment: rarely    Drug use: No    Sexual activity: Yes     Partners: Male     Other Topics Concern    Not on file     Social History Narrative    No narrative on file       Family History   Problem Relation Age of Onset    High Blood Pressure Mother     Arthritis Mother     Heart Disease Father     High Blood Pressure Brother     Diabetes Brother     Heart Attack Brother     Heart Disease Maternal Grandmother     Stroke Paternal Grandmother     Heart Disease Paternal Grandfather     High Blood Pressure Brother     Depression Sister     Thyroid Disease Sister     High Cholesterol Sister     Depression Sister     High Cholesterol Sister     Rheum Arthritis Neg Hx

## 2018-03-15 NOTE — PATIENT INSTRUCTIONS
Patient Education        Learning About Low-Carbohydrate Diets for Weight Loss  What is a low-carbohydrate diet? Low-carb diets avoid foods that are high in carbohydrate. These high-carb foods include pasta, bread, rice, cereal, fruits, and starchy vegetables. Instead, these diets usually have you eat foods that are high in fat and protein. Many people lose weight quickly on a low-carb diet. But the early weight loss is water. People on this diet often gain the weight back after they start eating carbs again. Not all diet plans are safe or work well. A lot of the evidence shows that low-carb diets aren't healthy. That's because these diets often don't include healthy foods like fruits and vegetables. Losing weight safely means balancing protein, fat, and carbs with every meal and snack. And low-carb diets don't always provide the vitamins, minerals, and fiber you need. If you have a serious medical condition, talk to your doctor before you try any diet. These conditions include kidney disease, heart disease, type 2 diabetes, high cholesterol, and high blood pressure. If you are pregnant, it may not be safe for your baby if you are on a low-carb diet. How can you lose weight safely? You might have heard that a diet plan helped another person lose weight. But that doesn't mean that it will work for you. It is very hard to stay on a diet that includes lots of big changes in your eating habits. If you want to get to a healthy weight and stay there, making healthy lifestyle changes will often work better than dieting. These steps can help. · Make a plan for change. Work with your doctor to create a plan that is right for you. · See a dietitian. He or she can show you how to make healthy changes in your eating habits. · Manage stress. If you have a lot of stress in your life, it can be hard to focus on making healthy changes to your daily habits. · Track your food and activity.  You are likely to do better at losing weight if you keep track of what you eat and what you do. Follow-up care is a key part of your treatment and safety. Be sure to make and go to all appointments, and call your doctor if you are having problems. It's also a good idea to know your test results and keep a list of the medicines you take. Where can you learn more? Go to https://chpepiceweb.PresseTrends.com. org and sign in to your [employer] account. Enter A121 in the dineout box to learn more about \"Learning About Low-Carbohydrate Diets for Weight Loss. \"     If you do not have an account, please click on the \"Sign Up Now\" link. Current as of: May 12, 2017  Content Version: 11.5  © 2303-6384 Healthwise, [employer]. Care instructions adapted under license by [facility] ([facility]). If you have questions about a medical condition or this instruction, always ask your healthcare professional. [place] [address] any warranty or liability for your use of this information. Patient Education        Well Visit, Women 48 to 72: Care Instructions  Your Care Instructions    Physical exams can help you stay healthy. Your doctor has checked your overall health and may have suggested ways to take good care of yourself. He or she also may have recommended tests. At home, you can help prevent illness with healthy eating, regular exercise, and other steps. Follow-up care is a key part of your treatment and safety. Be sure to make and go to all appointments, and call your doctor if you are having problems. It's also a good idea to know your test results and keep a list of the medicines you take. How can you care for yourself at home? · Reach and stay at a healthy weight. This will lower your risk for many problems, such as obesity, diabetes, heart disease, and high blood pressure. · Get at least 30 minutes of exercise on most days of the week. Walking is a good choice.  You also may want to do other activities, such as running,

## 2018-04-05 DIAGNOSIS — R23.2 HOT FLASHES: Primary | ICD-10-CM

## 2018-04-05 RX ORDER — ESTRADIOL 1 MG/1
1 TABLET ORAL DAILY
Qty: 30 TABLET | Refills: 3 | Status: SHIPPED | OUTPATIENT
Start: 2018-04-05 | End: 2018-08-13 | Stop reason: SDUPTHER

## 2018-05-02 ENCOUNTER — TELEPHONE (OUTPATIENT)
Dept: FAMILY MEDICINE CLINIC | Age: 53
End: 2018-05-02

## 2018-05-10 ENCOUNTER — NURSE ONLY (OUTPATIENT)
Dept: FAMILY MEDICINE CLINIC | Age: 53
End: 2018-05-10

## 2018-05-10 ENCOUNTER — TELEPHONE (OUTPATIENT)
Dept: FAMILY MEDICINE CLINIC | Age: 53
End: 2018-05-10

## 2018-05-10 DIAGNOSIS — Z12.11 SCREENING FOR COLON CANCER: ICD-10-CM

## 2018-05-10 LAB
CONTROL: NORMAL
HEMOCCULT STL QL: NORMAL

## 2018-05-10 PROCEDURE — 82274 ASSAY TEST FOR BLOOD FECAL: CPT | Performed by: INTERNAL MEDICINE

## 2018-06-14 ENCOUNTER — OFFICE VISIT (OUTPATIENT)
Dept: FAMILY MEDICINE CLINIC | Age: 53
End: 2018-06-14

## 2018-06-14 VITALS
DIASTOLIC BLOOD PRESSURE: 80 MMHG | OXYGEN SATURATION: 97 % | HEIGHT: 66 IN | BODY MASS INDEX: 37.93 KG/M2 | SYSTOLIC BLOOD PRESSURE: 130 MMHG | WEIGHT: 236 LBS | HEART RATE: 76 BPM

## 2018-06-14 DIAGNOSIS — R00.2 PALPITATION: ICD-10-CM

## 2018-06-14 DIAGNOSIS — M15.9 PRIMARY OSTEOARTHRITIS INVOLVING MULTIPLE JOINTS: ICD-10-CM

## 2018-06-14 DIAGNOSIS — J30.9 ALLERGIC RHINITIS, UNSPECIFIED CHRONICITY, UNSPECIFIED SEASONALITY, UNSPECIFIED TRIGGER: ICD-10-CM

## 2018-06-14 DIAGNOSIS — E66.9 OBESITY (BMI 30-39.9): Primary | ICD-10-CM

## 2018-06-14 DIAGNOSIS — E78.00 PURE HYPERCHOLESTEROLEMIA: ICD-10-CM

## 2018-06-14 DIAGNOSIS — Z23 NEED FOR VACCINATION: ICD-10-CM

## 2018-06-14 LAB
ALBUMIN SERPL-MCNC: 4.6 G/DL (ref 3.4–5)
ALP BLD-CCNC: 59 U/L (ref 40–129)
ALT SERPL-CCNC: 19 U/L (ref 10–40)
ANION GAP SERPL CALCULATED.3IONS-SCNC: 16 MMOL/L (ref 3–16)
AST SERPL-CCNC: 17 U/L (ref 15–37)
BILIRUB SERPL-MCNC: 0.3 MG/DL (ref 0–1)
BILIRUBIN DIRECT: <0.2 MG/DL (ref 0–0.3)
BILIRUBIN, INDIRECT: NORMAL MG/DL (ref 0–1)
BUN BLDV-MCNC: 18 MG/DL (ref 7–20)
CALCIUM SERPL-MCNC: 9.9 MG/DL (ref 8.3–10.6)
CHLORIDE BLD-SCNC: 102 MMOL/L (ref 99–110)
CHOLESTEROL, FASTING: 216 MG/DL (ref 0–199)
CO2: 25 MMOL/L (ref 21–32)
CREAT SERPL-MCNC: 0.8 MG/DL (ref 0.6–1.1)
GFR AFRICAN AMERICAN: >60
GFR NON-AFRICAN AMERICAN: >60
GLUCOSE BLD-MCNC: 86 MG/DL (ref 70–99)
HDLC SERPL-MCNC: 64 MG/DL (ref 40–60)
LDL CHOLESTEROL CALCULATED: 130 MG/DL
POTASSIUM SERPL-SCNC: 4.6 MMOL/L (ref 3.5–5.1)
SODIUM BLD-SCNC: 143 MMOL/L (ref 136–145)
TOTAL PROTEIN: 7.2 G/DL (ref 6.4–8.2)
TRIGLYCERIDE, FASTING: 111 MG/DL (ref 0–150)
TSH REFLEX FT4: 4.18 UIU/ML (ref 0.27–4.2)
VLDLC SERPL CALC-MCNC: 22 MG/DL

## 2018-06-14 PROCEDURE — 99214 OFFICE O/P EST MOD 30 MIN: CPT | Performed by: INTERNAL MEDICINE

## 2018-06-14 PROCEDURE — 36415 COLL VENOUS BLD VENIPUNCTURE: CPT | Performed by: INTERNAL MEDICINE

## 2018-06-14 RX ORDER — SIMVASTATIN 20 MG
TABLET ORAL
Qty: 90 TABLET | Refills: 3 | OUTPATIENT
Start: 2018-06-14

## 2018-06-15 LAB
ESTIMATED AVERAGE GLUCOSE: 114 MG/DL
HBA1C MFR BLD: 5.6 %

## 2018-06-18 DIAGNOSIS — E78.00 PURE HYPERCHOLESTEROLEMIA: Primary | ICD-10-CM

## 2018-06-18 RX ORDER — SIMVASTATIN 40 MG
TABLET ORAL
Qty: 90 TABLET | Refills: 2 | Status: SHIPPED | OUTPATIENT
Start: 2018-06-18 | End: 2019-04-29 | Stop reason: SDUPTHER

## 2018-06-26 DIAGNOSIS — N95.1 PERIMENOPAUSE: ICD-10-CM

## 2018-06-26 RX ORDER — ESTRADIOL 0.1 MG/D
1 FILM, EXTENDED RELEASE TRANSDERMAL
Qty: 8 PATCH | Refills: 3 | Status: SHIPPED | OUTPATIENT
Start: 2018-06-28 | End: 2019-03-18

## 2018-07-06 ENCOUNTER — HOSPITAL ENCOUNTER (OUTPATIENT)
Dept: WOMENS IMAGING | Age: 53
Discharge: HOME | End: 2018-07-06
Attending: OBSTETRICS & GYNECOLOGY | Admitting: OBSTETRICS & GYNECOLOGY

## 2018-07-06 DIAGNOSIS — Z01.419 WELL WOMAN EXAM WITH ROUTINE GYNECOLOGICAL EXAM: ICD-10-CM

## 2018-07-12 ENCOUNTER — HOSPITAL ENCOUNTER (OUTPATIENT)
Dept: NON INVASIVE DIAGNOSTICS | Age: 53
Discharge: HOME | End: 2018-07-12
Attending: INTERNAL MEDICINE | Admitting: INTERNAL MEDICINE

## 2018-07-12 DIAGNOSIS — R00.2 PALPITATIONS: ICD-10-CM

## 2018-07-12 LAB
LEFT VENTRICULAR EJECTION FRACTION HIGH VALUE: 60 %
LEFT VENTRICULAR EJECTION FRACTION MODE: NORMAL
LV EF: 60 %
LVEF MODALITY: NORMAL

## 2018-08-13 DIAGNOSIS — Z78.0 MENOPAUSE: Primary | ICD-10-CM

## 2018-08-13 RX ORDER — ESTRADIOL 1 MG/1
1 TABLET ORAL DAILY
Qty: 90 TABLET | Refills: 3 | Status: SHIPPED | OUTPATIENT
Start: 2018-08-13 | End: 2019-03-18 | Stop reason: SDUPTHER

## 2018-08-13 NOTE — TELEPHONE ENCOUNTER
From: Ruma Silver  Sent: 8/12/2018 1:17 PM EDT  Subject: Medication Renewal Request    Christy Lerner would like a refill of the following medications:     estradiol (ESTRACE) 1 MG tablet Gray Elias MD]    Preferred pharmacy: [address] [place] [address], [address] [place] [address] [phone] - F [phone]

## 2019-03-18 ENCOUNTER — OFFICE VISIT (OUTPATIENT)
Dept: GYNECOLOGY | Age: 54
End: 2019-03-18
Payer: COMMERCIAL

## 2019-03-18 VITALS
HEIGHT: 65 IN | BODY MASS INDEX: 40.15 KG/M2 | HEART RATE: 61 BPM | RESPIRATION RATE: 17 BRPM | SYSTOLIC BLOOD PRESSURE: 119 MMHG | WEIGHT: 241 LBS | DIASTOLIC BLOOD PRESSURE: 74 MMHG

## 2019-03-18 DIAGNOSIS — Z78.0 MENOPAUSE: ICD-10-CM

## 2019-03-18 DIAGNOSIS — Z01.419 WELL WOMAN EXAM WITH ROUTINE GYNECOLOGICAL EXAM: Primary | ICD-10-CM

## 2019-03-18 PROCEDURE — 99396 PREV VISIT EST AGE 40-64: CPT | Performed by: OBSTETRICS & GYNECOLOGY

## 2019-03-18 RX ORDER — ESTRADIOL 0.1 MG/D
1 FILM, EXTENDED RELEASE TRANSDERMAL
Qty: 8 PATCH | Refills: 1 | Status: SHIPPED | OUTPATIENT
Start: 2019-03-18 | End: 2019-12-12

## 2019-03-18 RX ORDER — ESTRADIOL 1 MG/1
1 TABLET ORAL DAILY
Qty: 90 TABLET | Refills: 3 | Status: SHIPPED | OUTPATIENT
Start: 2019-03-18 | End: 2019-12-12

## 2019-03-27 ASSESSMENT — ENCOUNTER SYMPTOMS
RESPIRATORY NEGATIVE: 1
GASTROINTESTINAL NEGATIVE: 1
EYES NEGATIVE: 1

## 2019-04-29 ENCOUNTER — OFFICE VISIT (OUTPATIENT)
Dept: FAMILY MEDICINE CLINIC | Age: 54
End: 2019-04-29
Payer: COMMERCIAL

## 2019-04-29 VITALS
HEART RATE: 75 BPM | OXYGEN SATURATION: 98 % | RESPIRATION RATE: 16 BRPM | WEIGHT: 239 LBS | SYSTOLIC BLOOD PRESSURE: 136 MMHG | DIASTOLIC BLOOD PRESSURE: 81 MMHG | BODY MASS INDEX: 39.82 KG/M2 | HEIGHT: 65 IN

## 2019-04-29 DIAGNOSIS — G89.29 CHRONIC LOW BACK PAIN, UNSPECIFIED BACK PAIN LATERALITY, WITH SCIATICA PRESENCE UNSPECIFIED: Primary | ICD-10-CM

## 2019-04-29 DIAGNOSIS — M54.5 CHRONIC LOW BACK PAIN, UNSPECIFIED BACK PAIN LATERALITY, WITH SCIATICA PRESENCE UNSPECIFIED: Primary | ICD-10-CM

## 2019-04-29 DIAGNOSIS — J30.9 ALLERGIC RHINITIS, UNSPECIFIED SEASONALITY, UNSPECIFIED TRIGGER: ICD-10-CM

## 2019-04-29 DIAGNOSIS — E78.00 PURE HYPERCHOLESTEROLEMIA: ICD-10-CM

## 2019-04-29 PROBLEM — M54.50 LOW BACK PAIN: Status: ACTIVE | Noted: 2019-04-29

## 2019-04-29 PROCEDURE — 99213 OFFICE O/P EST LOW 20 MIN: CPT | Performed by: INTERNAL MEDICINE

## 2019-04-29 RX ORDER — TIZANIDINE 4 MG/1
TABLET ORAL
Qty: 60 TABLET | Refills: 0 | Status: SHIPPED | OUTPATIENT
Start: 2019-04-29 | End: 2019-06-25

## 2019-04-29 RX ORDER — SIMVASTATIN 40 MG
TABLET ORAL
Qty: 90 TABLET | Refills: 0 | Status: SHIPPED | OUTPATIENT
Start: 2019-04-29 | End: 2019-07-17 | Stop reason: SDUPTHER

## 2019-04-29 RX ORDER — METHYLPREDNISOLONE 4 MG/1
TABLET ORAL
Qty: 1 KIT | Refills: 0 | Status: SHIPPED | OUTPATIENT
Start: 2019-04-29 | End: 2019-05-05

## 2019-04-29 ASSESSMENT — PATIENT HEALTH QUESTIONNAIRE - PHQ9
1. LITTLE INTEREST OR PLEASURE IN DOING THINGS: 0
SUM OF ALL RESPONSES TO PHQ QUESTIONS 1-9: 0
SUM OF ALL RESPONSES TO PHQ9 QUESTIONS 1 & 2: 0
2. FEELING DOWN, DEPRESSED OR HOPELESS: 0
SUM OF ALL RESPONSES TO PHQ QUESTIONS 1-9: 0

## 2019-04-29 NOTE — PROGRESS NOTES
tobacco. No alcohol no drugs exercises weekly. Has a . No STD concerns. Restful job      FH    Maternal aunt ovarian cancer?    + HTN, prediabetic and morbid obesity, drowning, ALS father,  sleep apnea and mother     -colon, depression, PGM depression requiring triple therapy     Review of systems: Up-to-date with eye exams. Dental exams up-to-date. No concussions or seizures. Denies any wheezing pneumonias or abnormal chest x-rays other than one episode of bronchiolitis when she worked at [employer]. No chest pain or palpitations. No syncope. No breast lumps or abnormalities. Is due her mammogram area rare GE reflux. No bloody stools kidney stones stress in his intolerance of urine. Denies any abnormal Pap smears. Positive arthralgias. Sees orthopedist. Has had a recent knee injection. Skin cancers. Denies any depression or anxiety. No known sleep apnea    Constitutional, ent, CV, respiratory, GI, , joint, skin, allergic and psychiatric ROS reviewed and negative except for above    No Known Allergies    Outpatient Medications Marked as Taking for the 4/29/19 encounter (Office Visit) with Salome Zurita MD   Medication Sig Dispense Refill    simvastatin (ZOCOR) 40 MG tablet 1 po q 24 hours for cholesterol 90 tablet 0    estradiol (MINIVELLE) 0.1 MG/24HR Place 1 patch onto the skin Twice a Week 8 patch 1    progesterone (PROMETRIUM) 100 MG capsule Take 1 capsule by mouth nightly 90 capsule 3    estradiol (ESTRACE) 1 MG tablet Take 1 tablet by mouth daily 90 tablet 3    fluticasone (FLONASE) 50 MCG/ACT nasal spray 1 spray by Nasal route daily 1 Bottle 3    acetaminophen (APAP EXTRA STRENGTH) 500 MG tablet 1 po bid prn 120 tablet 3    aspirin 81 MG tablet Take 81 mg by mouth daily      omeprazole (PRILOSEC OTC) 20 MG tablet Take 20 mg by mouth daily as needed.                Past Medical History:   Diagnosis Date    Allergic rhinitis     Hyperlipidemia     Medial meniscus tear     PONV (postoperative nausea and vomiting)     Scop patch worked well       Past Surgical History:   Procedure Laterality Date    FOOT SURGERY  4/2015    rt tendon repair    KNEE ARTHROSCOPY Left 05/31/2017    OTHER SURGICAL HISTORY Right 4/22/2015    Secondary repair of peroneus brevis tendon tear R    SHOULDER SURGERY Left 05/05/2017    TUBAL LIGATION  1993             Family History   Problem Relation Age of Onset    High Blood Pressure Mother     Arthritis Mother     Heart Disease Father     High Blood Pressure Brother     Diabetes Brother     Heart Attack Brother     Heart Disease Maternal Grandmother     Stroke Paternal Grandmother     Heart Disease Paternal Grandfather     High Blood Pressure Brother     Depression Sister     Thyroid Disease Sister     High Cholesterol Sister     Depression Sister     High Cholesterol Sister     Rheum Arthritis Neg Hx     Osteoarthritis Neg Hx     Asthma Neg Hx     Breast Cancer Neg Hx     Cancer Neg Hx     Heart Failure Neg Hx     Hypertension Neg Hx     Migraines Neg Hx     Ovarian Cancer Neg Hx     Rashes/Skin Problems Neg Hx     Seizures Neg Hx            Review of Systems    Objective     /81   Pulse 75   Resp 16   Ht 5' 5\" (1.651 m)   Wt 239 lb (108.4 kg)   SpO2 98% Comment: RA  BMI 39.77 kg/m²     @LASTSAO2(3)@    Wt Readings from Last 3 Encounters:   04/29/19 239 lb (108.4 kg)   03/18/19 241 lb (109.3 kg)   06/14/18 236 lb (107 kg)       Physical Exam     NAD alert and cooperative  HEENT: Fair dentition. No oral masses. Good upstroke of the carotids. Erythematous tonsils. Postnasal drip. No stridor. No adenopathy. Scant fluid TMs. Lungs are clear. Good IT ratio without any wheezes rales or rhonchi. Cardiovascular exam regular rate and rhythm no murmur click. Obesity. No hepatosplenomegaly or point tenderness. Good range of motion to hips. Muscle strength 5 out of 5. Sensation intact.  DTRs are diminished but equal. No antalgic gait. No point tenderness along the lumbar spine or cervical spine. Chemistry        Component Value Date/Time     06/14/2018 0906    K 4.6 06/14/2018 0906     06/14/2018 0906    CO2 25 06/14/2018 0906    BUN 18 06/14/2018 0906    CREATININE 0.8 06/14/2018 0906        Component Value Date/Time    CALCIUM 9.9 06/14/2018 0906    ALKPHOS 59 06/14/2018 0906    AST 17 06/14/2018 0906    ALT 19 06/14/2018 0906    BILITOT 0.3 06/14/2018 0906            Lab Results   Component Value Date    WBC 8.6 04/14/2015    HGB 13.2 04/14/2015    HCT 38.7 04/14/2015    MCV 87.8 04/14/2015     04/14/2015     Lab Results   Component Value Date    LABA1C 5.6 06/14/2018     Lab Results   Component Value Date    .0 06/14/2018     Lab Results   Component Value Date    LABA1C 5.6 06/14/2018     No components found for: CHLPL  Lab Results   Component Value Date    TRIG 119 06/05/2017    TRIG 101 07/09/2016    TRIG 120 06/28/2015     Lab Results   Component Value Date    HDL 64 (H) 06/14/2018    HDL 48 06/05/2017    HDL 53 07/09/2016     Lab Results   Component Value Date    LDLCALC 130 (H) 06/14/2018    LDLCALC 116 (H) 06/05/2017    LDLCALC 93 07/09/2016     Lab Results   Component Value Date    LABVLDL 22 06/14/2018    LABVLDL 24 06/05/2017    LABVLDL 20 07/09/2016       Old labs and records reviewed or requested  Discussed past lab and studies with patient      Diagnosis Orders   1. Chronic low back pain, unspecified back pain laterality, with sciatica presence unspecified     2. Pure hypercholesterolemia  simvastatin (ZOCOR) 40 MG tablet   3. Allergic rhinitis, unspecified seasonality, unspecified trigger         Low back pain. Physical therapy declined today. Continue with her home exercise. Muscle relaxant steroids. Consider imaging and physical therapy if this is not improved. Hyperlipidemia. Continue on with her Zocor to follow back up in 2 months. Rhinitis.  Current postnasal drip mildly erythematous pharynx. Return in about 2 months (around 6/29/2019). Diagnosis and treatment discussed.   Possible side effects of medication reviewed  Patients questions answered  Follow up understood  Pt aware if they are not contacted about any test results , this does not mean they are normal.  They should call

## 2019-04-29 NOTE — PATIENT INSTRUCTIONS
Patient Education        Back Pain: Care Instructions  Your Care Instructions    Back pain has many possible causes. It is often related to problems with muscles and ligaments of the back. It may also be related to problems with the nerves, discs, or bones of the back. Moving, lifting, standing, sitting, or sleeping in an awkward way can strain the back. Sometimes you don't notice the injury until later. Arthritis is another common cause of back pain. Although it may hurt a lot, back pain usually improves on its own within several weeks. Most people recover in 12 weeks or less. Using good home treatment and being careful not to stress your back can help you feel better sooner. Follow-up care is a key part of your treatment and safety. Be sure to make and go to all appointments, and call your doctor if you are having problems. It's also a good idea to know your test results and keep a list of the medicines you take. How can you care for yourself at home? · Sit or lie in positions that are most comfortable and reduce your pain. Try one of these positions when you lie down:  ? Lie on your back with your knees bent and supported by large pillows. ? Lie on the floor with your legs on the seat of a sofa or chair. ? Lie on your side with your knees and hips bent and a pillow between your legs. ? Lie on your stomach if it does not make pain worse. · Do not sit up in bed, and avoid soft couches and twisted positions. Bed rest can help relieve pain at first, but it delays healing. Avoid bed rest after the first day of back pain. · Change positions every 30 minutes. If you must sit for long periods of time, take breaks from sitting. Get up and walk around, or lie in a comfortable position. · Try using a heating pad on a low or medium setting for 15 to 20 minutes every 2 or 3 hours. Try a warm shower in place of one session with the heating pad.   · You can also try an ice pack for 10 to 15 minutes every 2 to 3 playing tennis or team sports. · Stretch your back muscles. Here are few exercises to try:  ? Lie on your back with your knees bent and your feet flat on the floor. Gently pull one bent knee to your chest. Put that foot back on the floor, and then pull the other knee to your chest. Hold for 15 to 30 seconds. Repeat 2 to 4 times. ? Do pelvic tilts. Lie on your back with your knees bent. Tighten your stomach muscles. Pull your belly button (navel) in and up toward your ribs. You should feel like your back is pressing to the floor and your hips and pelvis are slightly lifting off the floor. Hold for 6 seconds while breathing smoothly. · Keep your core muscles strong. The muscles of your back, belly (abdomen), and buttocks support your spine. ? Pull in your belly, and imagine pulling your navel toward your spine. Hold this for 6 seconds, then relax. Remember to keep breathing normally as you tense your muscles. ? Do curl-ups. Always do them with your knees bent. Keep your low back on the floor, and curl your shoulders toward your knees using a smooth, slow motion. Keep your arms folded across your chest. If this bothers your neck, try putting your hands behind your neck (not your head), with your elbows spread apart. ? Lie on your back with your knees bent and your feet flat on the floor. Tighten your belly muscles, and then push with your feet and raise your buttocks up a few inches. Hold this position 6 seconds as you continue to breathe normally, then lower yourself slowly to the floor. Repeat 8 to 12 times. ? If you like group exercise, try Pilates or yoga. These classes have poses that strengthen the core muscles. Protect your back when you sit  · Place a small pillow, a rolled-up towel, or a lumbar roll in the curve of your back if you need extra support. · Sit in a chair that is low enough to let you place both feet flat on the floor with both knees nearly level with your hips.  If your chair or desk is too high, use a foot rest to raise your knees. · When driving, keep your knees nearly level with your hips. Sit straight, and drive with both hands on the steering wheel. Your arms should be in a slightly bent position. · Try a kneeling chair, which helps tilt your hips forward. This takes pressure off your lower back. · Try sitting on an exercise ball. It can rock from side to side, which helps keep your back loose. Lift properly  · Squat down, bending at the hips and knees only. If you need to, put one knee to the floor and extend your other knee in front of you, bent at a right angle (half kneeling). · Press your chest straight forward. This helps keep your upper back straight while keeping a slight arch in your low back. · Hold the load as close to your body as possible, at the level of your navel. · Use your feet to change direction, taking small steps. · Lead with your hips as you change direction. Keep your shoulders in line with your hips as you move. Do not twist your body. · Set down your load carefully, squatting with your knees and hips only. When should you call for help? Watch closely for changes in your health, and be sure to contact your doctor if you have any problems. Where can you learn more? Go to https://dreamsha.re.Machine Talker. org and sign in to your Clipabout account. Enter S810 in the [employer] box to learn more about \"Back Care and Preventing Injuries: Care Instructions. \"     If you do not have an account, please click on the \"Sign Up Now\" link. Current as of: September 20, 2018  Content Version: 11.9  © 0167-1644 [employer]. Care instructions adapted under license by [facility] ([facility]). If you have questions about a medical condition or this instruction, always ask your healthcare professional. [place] [address] any warranty or liability for your use of this information.

## 2019-06-03 ENCOUNTER — OFFICE VISIT (OUTPATIENT)
Dept: ORTHOPEDIC SURGERY | Age: 54
End: 2019-06-03
Payer: COMMERCIAL

## 2019-06-03 VITALS
TEMPERATURE: 98.8 F | SYSTOLIC BLOOD PRESSURE: 123 MMHG | DIASTOLIC BLOOD PRESSURE: 87 MMHG | HEIGHT: 65 IN | HEART RATE: 63 BPM | WEIGHT: 234 LBS | BODY MASS INDEX: 38.99 KG/M2

## 2019-06-03 DIAGNOSIS — M25.531 RIGHT WRIST PAIN: Primary | ICD-10-CM

## 2019-06-03 DIAGNOSIS — S63.501A SPRAIN OF RIGHT WRIST, INITIAL ENCOUNTER: ICD-10-CM

## 2019-06-03 PROCEDURE — 99213 OFFICE O/P EST LOW 20 MIN: CPT | Performed by: PHYSICIAN ASSISTANT

## 2019-06-03 PROCEDURE — L3908 WHO COCK-UP NONMOLDE PRE OTS: HCPCS | Performed by: PHYSICIAN ASSISTANT

## 2019-06-03 NOTE — PROGRESS NOTES
Subjective:      Patient ID: Segun Alfredo is a 52 y.o.  female. Chief Complaint   Patient presents with    Wrist Pain     Right wrist        HPI:   She is here for an initial evaluation of right wrist pain. Onset of symptoms 3 weeks ago after an injury. [address] Pain is moderate. Location of pain- right wrist.  Pain is worse with movement. Pain improves with ice and elevation. There is not associated numbness/ tingling. Previous treatments have included: ice and rest with mild improvement. Review of Systems:   A 14 point review of systems and history form completed by the patient has been reviewed. This form is scanned in the media tab of the patient's chart under today's date.       Past Medical History:   Diagnosis Date    Allergic rhinitis     Hyperlipidemia     Medial meniscus tear     PONV (postoperative nausea and vomiting)     Scop patch worked well       Family History   Problem Relation Age of Onset    High Blood Pressure Mother     Arthritis Mother     Heart Disease Father     High Blood Pressure Brother     Diabetes Brother     Heart Attack Brother     Heart Disease Maternal Grandmother     Stroke Paternal Grandmother     Heart Disease Paternal Grandfather     High Blood Pressure Brother     Depression Sister     Thyroid Disease Sister     High Cholesterol Sister     Depression Sister     High Cholesterol Sister     Rheum Arthritis Neg Hx     Osteoarthritis Neg Hx     Asthma Neg Hx     Breast Cancer Neg Hx     Cancer Neg Hx     Heart Failure Neg Hx     Hypertension Neg Hx     Migraines Neg Hx     Ovarian Cancer Neg Hx     Rashes/Skin Problems Neg Hx     Seizures Neg Hx        Past Surgical History:   Procedure Laterality Date    FOOT SURGERY  4/2015    rt tendon repair    KNEE ARTHROSCOPY Left 05/31/2017    OTHER SURGICAL HISTORY Right 4/22/2015    Secondary repair of peroneus brevis tendon tear R    SHOULDER SURGERY Left 05/05/2017    TUBAL LIGATION  1993 Social History     Occupational History    Occupation: RN   Tobacco Use    Smoking status: Never Smoker    Smokeless tobacco: Never Used   Substance and Sexual Activity    Alcohol use: No     Alcohol/week: 0.0 oz     Comment: rarely    Drug use: No    Sexual activity: Yes     Partners: Male       Current Outpatient Medications   Medication Sig Dispense Refill    simvastatin (ZOCOR) 40 MG tablet 1 po q 24 hours for cholesterol 90 tablet 0    tiZANidine (ZANAFLEX) 4 MG tablet 1 po tid for back pain as needed 60 tablet 0    estradiol (MINIVELLE) 0.1 MG/24HR Place 1 patch onto the skin Twice a Week 8 patch 1    progesterone (PROMETRIUM) 100 MG capsule Take 1 capsule by mouth nightly 90 capsule 3    estradiol (ESTRACE) 1 MG tablet Take 1 tablet by mouth daily 90 tablet 3    fluticasone (FLONASE) 50 MCG/ACT nasal spray 1 spray by Nasal route daily 1 Bottle 3    acetaminophen (APAP EXTRA STRENGTH) 500 MG tablet 1 po bid prn 120 tablet 3    aspirin 81 MG tablet Take 81 mg by mouth daily      omeprazole (PRILOSEC OTC) 20 MG tablet Take 20 mg by mouth daily as needed.  cetirizine (ZYRTEC) 10 MG tablet Take 10 mg by mouth nightly        No current facility-administered medications for this visit. Objective:     @CAPHE  is  oriented to person, place and time, pleasant, well nourished, developed and in no acute distress. /87   Pulse 63   Temp 98.8 °F (37.1 °C) (Temporal)   Ht 5' 5\" (1.651 m)   Wt 234 lb (106.1 kg)   BMI 38.94 kg/m²      Right Wrist:  There is no swelling. There is no skeletal deformity. There is mild tenderness over dorsal mid wrist.  There is no tenderness over the anatomic snuffbox. .  Wrist range of motion is not limited by pain and or swelling. FDS,FDP and Common Extensor tendon function is intact to each digit. FPL and EPL tendon function is intact to the thumb.   Skin color, texture, turgor is normal.  No rashes or lesions found of the injured extremity. Vascular exam shows normal  And good capillary refill bilaterally. Sensation is subjectively normal in the whole hand. Digits are normally sensate. X Rays: performed in the office today:   AP, magnified navicular view as well as Lateral and Oblique of the Right Wrist:  Radiographs demonstrate normal bony alignment of the wrist. There is no radiographic evidence of a fracture or dislocation. Assessment:       ICD-10-CM    1. Right wrist pain M25.531 XR WRIST RIGHT (MIN 3 VIEWS)     Luiza Drummond Titan Wrist and Thumb Brace   2. Sprain of right wrist, initial encounter S63.501A         Plan:     The natural history of the patient's diagnosis as well as the treatment options were discussed in full and questions were answered. Risks and benefits of the treatment options also reviewed in detail. Recommended Splinting of the wrist  to promote rest and immobilization. Procedures    Luiza Drummond Titan Wrist and Thumb Brace     Patient was prescribed a Luiza Drummond Titan Wrist and Thumb Brace. The right wrist and thumb will require stabilization / immobilization from this semi-rigid / rigid orthosis to improve their function. The orthosis will assist in protecting the affected area, provide functional support and facilitate healing. The patient was educated and fit by a healthcare professional with expert knowledge and specialization in brace application while under the direct supervision of the treating physician. Verbal and written instructions for the use of and application of this item were provided. They were instructed to contact the office immediately should the brace result in increased pain, decreased sensation, increased swelling or worsening of the condition. Rest, Ice, Compression and Elevation  OTC NSAID'S discussed to be taken in appropriate  therapeutic doses. Activity restriction/ Modification discussed.      The patient was advised that NSAID-type medications have two very important potential side effects: gastrointestinal irritation including hemorrhage and renal injuries. She was asked to take the medication with food and to stop if she experiences any GI upset. I asked her to call for vomiting, abdominal pain or black/bloody stools. He should have renal function testing per his medical provider periodically. The patient expresses understanding of these issues and questions were answered. Tylenol for pain. Follow Up: 2 weeks  Call or return to clinic prn if these symptoms worsen or fail to improve as anticipated.

## 2019-06-25 PROBLEM — M19.012 OSTEOARTHRITIS OF LEFT GLENOHUMERAL JOINT: Status: RESOLVED | Noted: 2017-05-05 | Resolved: 2019-06-25

## 2019-06-25 PROBLEM — M17.12 PRIMARY OSTEOARTHRITIS OF LEFT KNEE: Status: RESOLVED | Noted: 2017-05-15 | Resolved: 2019-06-25

## 2019-06-25 PROBLEM — M25.512 CHRONIC LEFT SHOULDER PAIN: Status: RESOLVED | Noted: 2017-01-12 | Resolved: 2019-06-25

## 2019-06-25 PROBLEM — G89.29 CHRONIC LEFT SHOULDER PAIN: Status: RESOLVED | Noted: 2017-01-12 | Resolved: 2019-06-25

## 2019-06-25 PROBLEM — M22.42 CHONDROMALACIA PATELLAE OF LEFT KNEE: Status: RESOLVED | Noted: 2017-05-15 | Resolved: 2019-06-25

## 2019-06-25 PROBLEM — M79.4 HYPERTROPHY, FAT PAD, INFRAPATELLAR: Status: RESOLVED | Noted: 2017-05-15 | Resolved: 2019-06-25

## 2019-06-26 ENCOUNTER — OFFICE VISIT (OUTPATIENT)
Dept: FAMILY MEDICINE CLINIC | Age: 54
End: 2019-06-26
Payer: COMMERCIAL

## 2019-06-26 VITALS
BODY MASS INDEX: 39.99 KG/M2 | WEIGHT: 240 LBS | OXYGEN SATURATION: 95 % | HEART RATE: 73 BPM | RESPIRATION RATE: 16 BRPM | HEIGHT: 65 IN | SYSTOLIC BLOOD PRESSURE: 116 MMHG | DIASTOLIC BLOOD PRESSURE: 77 MMHG

## 2019-06-26 DIAGNOSIS — J30.9 ALLERGIC RHINITIS, UNSPECIFIED SEASONALITY, UNSPECIFIED TRIGGER: ICD-10-CM

## 2019-06-26 DIAGNOSIS — E78.00 PURE HYPERCHOLESTEROLEMIA: ICD-10-CM

## 2019-06-26 DIAGNOSIS — E66.9 OBESITY (BMI 30-39.9): ICD-10-CM

## 2019-06-26 DIAGNOSIS — Z12.11 SCREEN FOR COLON CANCER: ICD-10-CM

## 2019-06-26 DIAGNOSIS — G89.29 CHRONIC LOW BACK PAIN, UNSPECIFIED BACK PAIN LATERALITY, WITH SCIATICA PRESENCE UNSPECIFIED: ICD-10-CM

## 2019-06-26 DIAGNOSIS — M15.9 PRIMARY OSTEOARTHRITIS INVOLVING MULTIPLE JOINTS: ICD-10-CM

## 2019-06-26 DIAGNOSIS — Z00.01 ENCOUNTER FOR GENERAL ADULT MEDICAL EXAMINATION WITH ABNORMAL FINDINGS: Primary | ICD-10-CM

## 2019-06-26 DIAGNOSIS — M54.5 CHRONIC LOW BACK PAIN, UNSPECIFIED BACK PAIN LATERALITY, WITH SCIATICA PRESENCE UNSPECIFIED: ICD-10-CM

## 2019-06-26 PROBLEM — M15.0 PRIMARY OSTEOARTHRITIS INVOLVING MULTIPLE JOINTS: Status: ACTIVE | Noted: 2019-06-26

## 2019-06-26 LAB
ALBUMIN SERPL-MCNC: 4.2 G/DL (ref 3.4–5)
ALP BLD-CCNC: 49 U/L (ref 40–129)
ALT SERPL-CCNC: 10 U/L (ref 10–40)
ANION GAP SERPL CALCULATED.3IONS-SCNC: 15 MMOL/L (ref 3–16)
AST SERPL-CCNC: 11 U/L (ref 15–37)
BILIRUB SERPL-MCNC: 0.5 MG/DL (ref 0–1)
BILIRUBIN DIRECT: <0.2 MG/DL (ref 0–0.3)
BILIRUBIN, INDIRECT: ABNORMAL MG/DL (ref 0–1)
BUN BLDV-MCNC: 12 MG/DL (ref 7–20)
CALCIUM SERPL-MCNC: 9.7 MG/DL (ref 8.3–10.6)
CHLORIDE BLD-SCNC: 102 MMOL/L (ref 99–110)
CHOLESTEROL, FASTING: 154 MG/DL (ref 0–199)
CO2: 25 MMOL/L (ref 21–32)
CREAT SERPL-MCNC: 0.7 MG/DL (ref 0.6–1.1)
GFR AFRICAN AMERICAN: >60
GFR NON-AFRICAN AMERICAN: >60
GLUCOSE BLD-MCNC: 88 MG/DL (ref 70–99)
HDLC SERPL-MCNC: 52 MG/DL (ref 40–60)
LDL CHOLESTEROL CALCULATED: 85 MG/DL
POTASSIUM SERPL-SCNC: 4.4 MMOL/L (ref 3.5–5.1)
SEDIMENTATION RATE, ERYTHROCYTE: 15 MM/HR (ref 0–30)
SODIUM BLD-SCNC: 142 MMOL/L (ref 136–145)
TOTAL PROTEIN: 6.4 G/DL (ref 6.4–8.2)
TRIGLYCERIDE, FASTING: 84 MG/DL (ref 0–150)
VLDLC SERPL CALC-MCNC: 17 MG/DL

## 2019-06-26 PROCEDURE — 36415 COLL VENOUS BLD VENIPUNCTURE: CPT | Performed by: INTERNAL MEDICINE

## 2019-06-26 PROCEDURE — 99396 PREV VISIT EST AGE 40-64: CPT | Performed by: INTERNAL MEDICINE

## 2019-06-26 RX ORDER — TIZANIDINE 4 MG/1
TABLET ORAL
Qty: 90 TABLET | Refills: 1 | Status: SHIPPED | OUTPATIENT
Start: 2019-06-26 | End: 2021-05-13

## 2019-06-26 RX ORDER — MELOXICAM 15 MG/1
TABLET ORAL
Qty: 90 TABLET | Refills: 2 | Status: SHIPPED | OUTPATIENT
Start: 2019-06-26 | End: 2020-04-29

## 2019-06-26 RX ORDER — DULOXETIN HYDROCHLORIDE 30 MG/1
CAPSULE, DELAYED RELEASE ORAL
Qty: 30 CAPSULE | Refills: 0 | Status: SHIPPED | OUTPATIENT
Start: 2019-06-26 | End: 2019-07-17 | Stop reason: SDUPTHER

## 2019-06-26 NOTE — PATIENT INSTRUCTIONS
Patient Education        Osteoarthritis: Care Instructions  Your Care Instructions    Arthritis is a common health problem in which the joints are inflamed. There are several kinds of arthritis. Osteoarthritis is caused by a breakdown of cartilage, the hard, thick tissue that cushions the joints. It causes pain, stiffness, and swelling, often in the spine, fingers, hips, and knees. Osteoarthritis can happen at any age, but it is most common in older people. Osteoarthritis never goes away completely, but it can be controlled. Medicine and home treatment can reduce the pain and prevent the arthritis from getting worse. Follow-up care is a key part of your treatment and safety. Be sure to make and go to all appointments, and call your doctor if you are having problems. It's also a good idea to know your test results and keep a list of the medicines you take. How can you care for yourself at home? · Take a warm shower or bath in the morning to relieve stiffness. Avoid sitting still afterwards. · If the joint is not swollen, use moist heat, like a warm, damp towel, for 20 to 30 minutes, 2 or 3 times a day. Do not use heat on a swollen joint. · If the joint is swollen, use ice or cold packs for 10 to 20 minutes, once an hour. Cold will help relieve pain and reduce inflammation. Put a thin cloth between the ice and your skin. · To prevent stiffness, gently move the joint through its full range of motion several times a day. · If the joint hurts, avoid activities that put a strain on it for a few days. Take rest breaks throughout the day. · Get regular exercise. Walking, swimming, yoga, biking, kaylin chi, and water aerobics are good exercises that are gentle on the joints. · Reach and stay at a healthy weight. If you need to lose or maintain weight, regular exercise and a healthy diet will help. Extra weight can strain the joints, especially the knees and hips, and make the pain worse.  Losing even a few pounds may help.  · Take pain medicines exactly as directed. ? If the doctor gave you a prescription medicine for pain, take it as prescribed. ? If you are not taking a prescription pain medicine, ask your doctor if you can take an over-the-counter medicine. When should you call for help? Call your doctor now or seek immediate medical care if:    · The pain is so bad that you cannot use the joint.     · You have sudden back pain with weakness in your legs or loss of bowel or bladder control.     · Your stools are black and tarlike or have streaks of blood.     · You have severe pain and swelling in more than one joint.    Watch closely for changes in your health, and be sure to contact your doctor if:    · You have side effects from the medicines, like belly pain, ongoing heartburn, or nausea.     · Joint pain continues for more than 6 weeks, and home treatment is not helping. Where can you learn more? Go to [email or web address] org and sign in to your [employer] account. Enter K715 in the [employer] box to learn more about \"Osteoarthritis: Care Instructions. \"     If you do not have an account, please click on the \"Sign Up Now\" link. Current as of: Hina 10, 2018  Content Version: 12.0  © 3823-6758 Healthwise, [employer]. Care instructions adapted under license by [facility] ([facility]). If you have questions about a medical condition or this instruction, always ask your healthcare professional. [place] [address] any warranty or liability for your use of this information. Patient Education        duloxetine  Pronunciation:  patience MEDEIROS 25 e teen  Brand:  Lisa Santana  What is the most important information I should know about duloxetine? Do not take duloxetine within 5 days before or 14 days after you have used an MAO inhibitor, such as isocarboxazid, linezolid, methylene blue injection, phenelzine, rasagiline, selegiline, or tranylcypromine.   Some young people have thoughts instructions adapted under license by [facility] ([facility]). If you have questions about a medical condition or this instruction, always ask your healthcare professional. [address] any warranty or liability for your use of this information.

## 2019-06-26 NOTE — PROGRESS NOTES
History and Physical      Ruma Silver  YOB: 1965    Date of Service:  2019    Chief Complaint:   Ruma Silver is a 52 y.o. female who presents for complete physical examination. HPI: 68-year-old woman with osteoarthritis, family history of early myocardial infarction, hyperlipidemia, BMI 36, hyperlipidemia, presenting for a well within exam.      BMI 40. Weight watchers x 1 month. Trainer weekly and cardio twice weekly. 60 min 3-4 times weekly  + sore knees. She is lost 7 pounds however weight is up from her last visit here. Occasional knee pain no chronic GE reflux however on occasion. No known apnea. No prediabetes. Chronic low back pain. Been in physical therapy. Sandy Matamoros in 2016 with mild multilevel spondylosis and facet arthropathy most significant at L4-5. Positive degenerative disease of the hips. Has been using nonsteroidal  and muscle relaxants. Some improvement with steroid. Currently using Advil 600 mg 3 times a day. Positive  physical therapy. No injections. Positive shoulder surgery knee surgery and ankle surgery. Intermittent rhinitis improved with Zyrtec and Flonase. No current symptoms       Strong family history of myocardial infarction. Denies any chest pain or new exercise intolerance. Takes a statin 40 mg daily. Aspirin daily. No bleeding. Echocardiogram  EF 66%. Trivial mitral regurgitation. Trivial pulmonic regurgitation. No chest pain or increased exercise intolerance Occasional palpitations when she drinks coffee          no family history of breast cancer. [place]  up-to-date. Does breast exams. . Is on estrogen.  No std concerns. Taking estrogen replacement with GYN.     PMH:  , tubal ligarion, shoulder surgery, knee arthroscopy, tendon repair        SH:  ( 3 outside). Pre post cath lab f . No tobacco. No alcohol no drugs. exercises weekly. Has a .  No STD concerns. stressful job      FH    Maternal aunt ovarian cancer?    + HTN, prediabetic and morbid obesity, drowning, ALS father,  sleep apnea and mother     -colon, depression, PGM depression requiring triple therapy     Review of systems: Up-to-date with eye exams. Dental exams up-to-date. No concussions or seizures. Denies any wheezing pneumonias or abnormal chest x-rays other than one episode of bronchiolitis when she worked at [employer]. No chest pain or palpitations. No syncope. No breast lumps or abnormalities. Is due her mammogram area rare GE reflux. No bloody stools kidney stones stress in his intolerance of urine. Denies any abnormal Pap smears. Positive arthralgias. Sees orthopedist. Has had a recent knee injection. Skin cancers. Denies any depression or anxiety. No known sleep apnea         Wt Readings from Last 3 Encounters:   06/26/19 240 lb (108.9 kg)   06/03/19 234 lb (106.1 kg)   04/29/19 239 lb (108.4 kg)     BP Readings from Last 3 Encounters:   06/26/19 116/77   06/03/19 123/87   04/29/19 136/81       Patient Active Problem List   Diagnosis    Allergic rhinitis    HLD (hyperlipidemia)    FH: MI in first degree male relative    FH: Chelsey Gehrig's disease    S/P arthroscopy of left shoulder, debridement, [place], SAD and Biceps tenotomy 5/05/2017    Obesity (BMI 30-39. 9)    S/P left knee arthroscopy, chondroplasty and synovectomy 5/31/2017    Tendinitis of left rotator cuff    Low back pain    Sprain of right wrist       Preventive Care:  Health Maintenance   Topic Date Due    Shingles Vaccine (1 of 2) 07/23/2013    Colon Cancer Screen FIT/FOBT  05/10/2019    DTaP/Tdap/Td vaccine (2 - Td) 12/01/2019    Breast cancer screen  07/06/2020    Lipid screen  06/14/2023    Cervical cancer screen  03/18/2024    Flu vaccine  Completed    Hepatitis C screen  Completed    HIV screen  Completed    Pneumococcal 0-64 years Vaccine  Aged Out      Hx abnormal PAP: post partum and repeats normal  Sexual activity: yest   Self-breast exams: Age of Onset    High Blood Pressure Mother     Arthritis Mother     Heart Disease Father     High Blood Pressure Brother     Diabetes Brother     Heart Attack Brother     Heart Disease Maternal Grandmother     Stroke Paternal Grandmother     Heart Disease Paternal Grandfather     High Blood Pressure Brother     Depression Sister     Thyroid Disease Sister     High Cholesterol Sister     Depression Sister     High Cholesterol Sister     Rheum Arthritis Neg Hx     Osteoarthritis Neg Hx     Asthma Neg Hx     Breast Cancer Neg Hx     Cancer Neg Hx     Heart Failure Neg Hx     Hypertension Neg Hx     Migraines Neg Hx     Ovarian Cancer Neg Hx     Rashes/Skin Problems Neg Hx     Seizures Neg Hx      Social History     Socioeconomic History    Marital status:      Spouse name: Not on file    Number of children: 3    Years of education: Not on file    Highest education level: Not on file   Occupational History    Occupation: RN   Social Needs    Financial resource strain: Not on file    Food insecurity:     Worry: Not on file     Inability: Not on file   Cmxtwenty needs:     Medical: Not on file     Non-medical: Not on file   Tobacco Use    Smoking status: Never Smoker    Smokeless tobacco: Never Used   Substance and Sexual Activity    Alcohol use: No     Alcohol/week: 0.0 oz     Comment: rarely    Drug use: No    Sexual activity: Yes     Partners: Male   Lifestyle    Physical activity:     Days per week: Not on file     Minutes per session: Not on file    Stress: Not on file   Relationships    Social connections:     Talks on phone: Not on file     Gets together: Not on file     Attends Yarsanism service: Not on file     Active member of club or organization: Not on file     Attends meetings of clubs or organizations: Not on file     Relationship status: Not on file    Intimate partner violence:     Fear of current or ex partner: Not on file     Emotionally abused: 2 to 3 weeks on Cymbalta

## 2019-07-17 ENCOUNTER — OFFICE VISIT (OUTPATIENT)
Dept: FAMILY MEDICINE CLINIC | Age: 54
End: 2019-07-17
Payer: COMMERCIAL

## 2019-07-17 VITALS
DIASTOLIC BLOOD PRESSURE: 80 MMHG | RESPIRATION RATE: 12 BRPM | OXYGEN SATURATION: 99 % | SYSTOLIC BLOOD PRESSURE: 128 MMHG | HEART RATE: 67 BPM | WEIGHT: 233 LBS | BODY MASS INDEX: 38.82 KG/M2 | HEIGHT: 65 IN

## 2019-07-17 DIAGNOSIS — E66.9 OBESITY (BMI 30-39.9): ICD-10-CM

## 2019-07-17 DIAGNOSIS — M15.9 PRIMARY OSTEOARTHRITIS INVOLVING MULTIPLE JOINTS: ICD-10-CM

## 2019-07-17 DIAGNOSIS — M54.5 CHRONIC LOW BACK PAIN, UNSPECIFIED BACK PAIN LATERALITY, WITH SCIATICA PRESENCE UNSPECIFIED: Primary | ICD-10-CM

## 2019-07-17 DIAGNOSIS — G89.29 CHRONIC LOW BACK PAIN, UNSPECIFIED BACK PAIN LATERALITY, WITH SCIATICA PRESENCE UNSPECIFIED: Primary | ICD-10-CM

## 2019-07-17 DIAGNOSIS — E78.00 PURE HYPERCHOLESTEROLEMIA: ICD-10-CM

## 2019-07-17 PROCEDURE — 99213 OFFICE O/P EST LOW 20 MIN: CPT | Performed by: INTERNAL MEDICINE

## 2019-07-17 RX ORDER — SIMVASTATIN 40 MG
TABLET ORAL
Qty: 90 TABLET | Refills: 3 | Status: SHIPPED | OUTPATIENT
Start: 2019-07-17 | End: 2019-12-12 | Stop reason: SDUPTHER

## 2019-07-17 RX ORDER — SIMVASTATIN 40 MG
TABLET ORAL
Qty: 90 TABLET | Refills: 0 | Status: CANCELLED | OUTPATIENT
Start: 2019-07-17

## 2019-07-17 RX ORDER — FLUTICASONE PROPIONATE 50 MCG
1 SPRAY, SUSPENSION (ML) NASAL DAILY
Qty: 1 BOTTLE | Refills: 3 | Status: SHIPPED | OUTPATIENT
Start: 2019-07-17

## 2019-07-17 RX ORDER — DULOXETIN HYDROCHLORIDE 30 MG/1
CAPSULE, DELAYED RELEASE ORAL
Qty: 90 CAPSULE | Refills: 0 | Status: SHIPPED | OUTPATIENT
Start: 2019-07-17 | End: 2019-10-25 | Stop reason: SDUPTHER

## 2019-07-17 NOTE — PROGRESS NOTES
HPI: Jorge Alberto Acosta presents for follow-up Cymbalta. Chronic health issues include elevated BMI, chronic arthralgias, intermittent rhinitis, family history of myocardial infarction    Chronic arthralgias was on Advil daily switched over to Cymbalta and notes to 80% improvement of her joint symptoms. Film 2016 with mild multi level spondylosis and facet arthropathy most significant at L4-5. Positive DJD of the hips. Uses muscle relaxers infrequently. Has cut back on her Mobic to 7.5 mg daily. Continue to do stretching. Is exercising. Pleased with how things are doing. No decreased libido unusual dreams or change in mentation. BMI 39. Is down another 7 pounds with her weight watchers and exercise. Has a  cardio twice weekly knees are doing better. Pleased with her progress. Less GE reflux. No prediabetes     Intermittent rhinitis improved with Zyrtec and Flonase. No current symptoms       Strong family history of myocardial infarction. Denies any chest pain or new exercise intolerance. Takes a statin 40 mg daily. Aspirin daily. No bleeding. Echocardiogram 2018 EF 66%. Trivial mitral regurgitation. Trivial pulmonic regurgitation. No chest pain or increased exercise intolerance Occasional palpitations when she drinks coffee          no family history of breast cancer. mammogram up-to-date. Does breast exams. . Is on estrogen.  No std concerns. Taking estrogen replacement with GYN.     PMH:  , tubal ligarion, shoulder surgery, knee arthroscopy, tendon repair        SH:  ( 3 outside). Pre post cath lab f . No tobacco. No alcohol no drugs. exercises weekly. Has a . No STD concerns. stressful job      FH    Maternal aunt ovarian cancer?    + HTN, prediabetic and morbid obesity, drowning, ALS father,  sleep apnea and mother     -colon, depression, PGM depression requiring triple therapy     Review of systems: Up-to-date with eye exams.  Dental exams HISTORY Right 4/22/2015    Secondary repair of peroneus brevis tendon tear R    SHOULDER SURGERY Left 05/05/2017    TUBAL LIGATION  1993             Family History   Problem Relation Age of Onset    High Blood Pressure Mother     Arthritis Mother     Heart Disease Father     High Blood Pressure Brother     Diabetes Brother     Heart Attack Brother     Heart Disease Maternal Grandmother     Stroke Paternal Grandmother     Heart Disease Paternal Grandfather     High Blood Pressure Brother     Depression Sister     Thyroid Disease Sister     High Cholesterol Sister     Depression Sister     High Cholesterol Sister     Rheum Arthritis Neg Hx     Osteoarthritis Neg Hx     Asthma Neg Hx     Breast Cancer Neg Hx     Cancer Neg Hx     Heart Failure Neg Hx     Hypertension Neg Hx     Migraines Neg Hx     Ovarian Cancer Neg Hx     Rashes/Skin Problems Neg Hx     Seizures Neg Hx            Review of Systems        Objective     /80   Pulse 67   Resp 12   Ht 5' 4.5\" (1.638 m)   Wt 233 lb (105.7 kg)   SpO2 99% Comment: RA  BMI 39.38 kg/m²     @LASTSAO2(3)@    Wt Readings from Last 3 Encounters:   07/17/19 233 lb (105.7 kg)   06/26/19 240 lb (108.9 kg)   06/03/19 234 lb (106.1 kg)       Physical Exam     NAD alert and cooperative dynamic but not manic  HEENT: Small hypopharynx. Good dentition. Lungs are clear. Good CANDY ratio without any wheezes rales or rhonchi. Cardiovascular exam regular rate and rhythm without any murmur or click. Obesity no hepatosplenomegaly or epigastric tenderness. Positive bowel sounds. Good range of motion lower extremities. Mild crepitus of the knees. No peripheral edema or cyanosis.   No suspicious nodules or rash        Chemistry        Component Value Date/Time     06/26/2019 0938    K 4.4 06/26/2019 0938     06/26/2019 0938    CO2 25 06/26/2019 0938    BUN 12 06/26/2019 0938    CREATININE 0.7 06/26/2019 0938        Component Value Date/Time

## 2019-08-21 RX ORDER — ESTRADIOL 2 MG/1
2 TABLET ORAL DAILY
Qty: 90 TABLET | Refills: 3 | Status: SHIPPED | OUTPATIENT
Start: 2019-08-21 | End: 2021-02-25 | Stop reason: ALTCHOICE

## 2019-10-28 RX ORDER — DULOXETIN HYDROCHLORIDE 30 MG/1
CAPSULE, DELAYED RELEASE ORAL
Qty: 90 CAPSULE | Refills: 0 | Status: SHIPPED | OUTPATIENT
Start: 2019-10-28 | End: 2019-12-12 | Stop reason: SDUPTHER

## 2019-11-08 ENCOUNTER — HOSPITAL ENCOUNTER (OUTPATIENT)
Dept: WOMENS IMAGING | Age: 54
Discharge: HOME | End: 2019-11-08
Payer: COMMERCIAL

## 2019-11-08 DIAGNOSIS — Z01.419 WELL WOMAN EXAM WITH ROUTINE GYNECOLOGICAL EXAM: ICD-10-CM

## 2019-11-08 PROCEDURE — 77067 SCR MAMMO BI INCL CAD: CPT

## 2019-12-12 ENCOUNTER — OFFICE VISIT (OUTPATIENT)
Dept: FAMILY MEDICINE CLINIC | Age: 54
End: 2019-12-12
Payer: COMMERCIAL

## 2019-12-12 VITALS
OXYGEN SATURATION: 95 % | SYSTOLIC BLOOD PRESSURE: 136 MMHG | HEIGHT: 65 IN | DIASTOLIC BLOOD PRESSURE: 85 MMHG | HEART RATE: 68 BPM | WEIGHT: 231 LBS | BODY MASS INDEX: 38.49 KG/M2 | RESPIRATION RATE: 16 BRPM

## 2019-12-12 DIAGNOSIS — E66.9 OBESITY (BMI 30-39.9): ICD-10-CM

## 2019-12-12 DIAGNOSIS — E78.00 PURE HYPERCHOLESTEROLEMIA: ICD-10-CM

## 2019-12-12 DIAGNOSIS — M15.9 PRIMARY OSTEOARTHRITIS INVOLVING MULTIPLE JOINTS: Primary | ICD-10-CM

## 2019-12-12 PROBLEM — S63.501A SPRAIN OF RIGHT WRIST: Status: RESOLVED | Noted: 2019-06-03 | Resolved: 2019-12-12

## 2019-12-12 PROBLEM — M75.82 TENDINITIS OF LEFT ROTATOR CUFF: Status: RESOLVED | Noted: 2017-07-27 | Resolved: 2019-12-12

## 2019-12-12 LAB — HBA1C MFR BLD: 5.6 %

## 2019-12-12 PROCEDURE — 83036 HEMOGLOBIN GLYCOSYLATED A1C: CPT | Performed by: INTERNAL MEDICINE

## 2019-12-12 PROCEDURE — 99213 OFFICE O/P EST LOW 20 MIN: CPT | Performed by: INTERNAL MEDICINE

## 2019-12-12 RX ORDER — SIMVASTATIN 40 MG
TABLET ORAL
Qty: 90 TABLET | Refills: 1 | Status: SHIPPED | OUTPATIENT
Start: 2019-12-12 | End: 2020-08-05 | Stop reason: SDUPTHER

## 2019-12-12 RX ORDER — DULOXETIN HYDROCHLORIDE 30 MG/1
CAPSULE, DELAYED RELEASE ORAL
Qty: 90 CAPSULE | Refills: 1 | Status: SHIPPED | OUTPATIENT
Start: 2019-12-12 | End: 2020-09-10 | Stop reason: SDUPTHER

## 2020-01-09 ENCOUNTER — OFFICE VISIT (OUTPATIENT)
Dept: FAMILY MEDICINE CLINIC | Age: 55
End: 2020-01-09
Payer: COMMERCIAL

## 2020-01-09 VITALS
SYSTOLIC BLOOD PRESSURE: 127 MMHG | WEIGHT: 236 LBS | HEIGHT: 65 IN | TEMPERATURE: 98.2 F | BODY MASS INDEX: 39.32 KG/M2 | HEART RATE: 70 BPM | OXYGEN SATURATION: 98 % | RESPIRATION RATE: 12 BRPM | DIASTOLIC BLOOD PRESSURE: 88 MMHG

## 2020-01-09 PROCEDURE — 99213 OFFICE O/P EST LOW 20 MIN: CPT | Performed by: INTERNAL MEDICINE

## 2020-01-09 RX ORDER — BENZONATATE 100 MG/1
100 CAPSULE ORAL 3 TIMES DAILY PRN
Qty: 30 CAPSULE | Refills: 0 | Status: SHIPPED | OUTPATIENT
Start: 2020-01-09 | End: 2020-01-19

## 2020-01-09 RX ORDER — OFLOXACIN 3 MG/ML
1 SOLUTION/ DROPS OPHTHALMIC 4 TIMES DAILY
Qty: 5 ML | Refills: 0 | Status: SHIPPED | OUTPATIENT
Start: 2020-01-09 | End: 2020-09-03

## 2020-01-09 RX ORDER — AZITHROMYCIN 250 MG/1
250 TABLET, FILM COATED ORAL SEE ADMIN INSTRUCTIONS
Qty: 6 TABLET | Refills: 0 | Status: SHIPPED | OUTPATIENT
Start: 2020-01-09 | End: 2020-01-14

## 2020-01-09 ASSESSMENT — PATIENT HEALTH QUESTIONNAIRE - PHQ9
SUM OF ALL RESPONSES TO PHQ QUESTIONS 1-9: 0
1. LITTLE INTEREST OR PLEASURE IN DOING THINGS: 0
SUM OF ALL RESPONSES TO PHQ9 QUESTIONS 1 & 2: 0
SUM OF ALL RESPONSES TO PHQ QUESTIONS 1-9: 0
2. FEELING DOWN, DEPRESSED OR HOPELESS: 0

## 2020-01-09 NOTE — PROGRESS NOTES
HPI: Modesta Slater presents for conjunctivitis and cough. Chronic health issues include elevated BMI, chronic arthralgias, intermittent rhinitis, family history of myocardial infarction    1 week ago onset of sore throat and cough followed by congestion and fatigue, anorexia. Yesterday head congestion , sinus discharge and conjunctival crusting. Now red, itchy. Does  Low grade fever. dry cough. Flonase,, sudofed and benadryl and zyrtec no tobacco.  Does not need a note for work. Drinking no vomiting. No diarrhea. No known exposures. Does have a history of rhinitis however no asthma. No tobacco.    Generalized osteoarthritis. Stretching. Significant improvement with Cymbalta 30%. 216 film with multilevel spondylolysis and facet hypertrophy is most significant at L4-5. Positive DJD of the hips. Intermittent Mobic. BMI 39. Transient use of weight watchers exercise and . Needs to get back with this. Was doing well. GE reflux and knee pain. No known apnea.          Intermittent rhinitis improved with Zyrtec and Flonase.       Strong family history of myocardial infarction. Denies any  new exercise intolerance. Takes a statin 40 mg daily. Aspirin daily. . Echocardiogram  EF 66%. Trivial mitral regurgitation. Trivial pulmonic regurgitation. No chest pain or increased exercise intolerance Occasional palpitations when she drinks coffee         . no family history of breast cancer.  mammogram up-to-date.  Does breast exams. . Is on estrogen.  No std concerns. Taking estrogen and progesterone replacement with GYN. Continued menses.      PMH:  , tubal ligation, shoulder surgery, knee arthroscopy, tendon repair        SH:  ( 3 outside). Pre post cath lab. Likes her job. . No tobacco. No alcoho, drugs. exercises weekly. Has a .  No STD concerns.       FH    Maternal aunt ovarian cancer?    + HTN, prediabetic and morbid obesity, drowning, ALS father, Jett Grossman apnea and mother     -colon, depression, PGM depression requiring triple therapy     Review of systems: Up-to-date with eye exams. Dental exams up-to-date. No concussions or seizures. Denies any wheezing pneumonias or abnormal chest x-rays other than one episode of bronchiolitis when she worked at [employer]. No chest pain or palpitations when not drinking coffee. . No syncope. No breast lumps or abnormalities. rare GE reflux. No bloody stools kidney stones stress in his intolerance of urine. Denies any abnormal Pap smears. GYN. Continuing menses. Positive arthralgias. Sees orthopedist. Has had a recent knee injection. Skin cancers. Denies any depression or anxiety. No known sleep apnea       Constitutional, ent, CV, respiratory, GI, , joint, skin, allergic and psychiatric ROS reviewed and negative except for above    No Known Allergies    Outpatient Medications Marked as Taking for the 1/9/20 encounter (Office Visit) with Guido De MD   Medication Sig Dispense Refill    DULoxetine (CYMBALTA) 30 MG extended release capsule TAKE 1 CAPSULE BY MOUTH ONE TIME A DAY AS NEEDED FOR PAIN 90 capsule 1    simvastatin (ZOCOR) 40 MG tablet 1 po q 24 hours for cholesterol 90 tablet 1    estradiol (ESTRACE) 2 MG tablet Take 1 tablet by mouth daily 90 tablet 3    fluticasone (FLONASE) 50 MCG/ACT nasal spray 1 spray by Nasal route daily 1 Bottle 3    meloxicam (MOBIC) 15 MG tablet Take one tab 15 mg by mouth daily with food 90 tablet 2    progesterone (PROMETRIUM) 100 MG capsule Take 1 capsule by mouth nightly 90 capsule 3    omeprazole (PRILOSEC OTC) 20 MG tablet Take 20 mg by mouth daily as needed.       cetirizine (ZYRTEC) 10 MG tablet Take 10 mg by mouth nightly                Past Medical History:   Diagnosis Date    Allergic rhinitis     Hyperlipidemia     Medial meniscus tear     PONV (postoperative nausea and vomiting)     Scop patch worked well    Primary osteoarthritis involving multiple joints 6/26/2019       Past Surgical History:   Procedure Laterality Date    FOOT SURGERY  4/2015    rt tendon repair    KNEE ARTHROSCOPY Left 05/31/2017    OTHER SURGICAL HISTORY Right 4/22/2015    Secondary repair of peroneus brevis tendon tear R    SHOULDER SURGERY Left 05/05/2017    TUBAL LIGATION  1993             Family History   Problem Relation Age of Onset    High Blood Pressure Mother     Arthritis Mother     Heart Disease Father     High Blood Pressure Brother     Diabetes Brother     Heart Attack Brother     Heart Disease Maternal Grandmother     Stroke Paternal Grandmother     Heart Disease Paternal Grandfather     High Blood Pressure Brother     Depression Sister     Thyroid Disease Sister     High Cholesterol Sister     Depression Sister     High Cholesterol Sister     Rheum Arthritis Neg Hx     Osteoarthritis Neg Hx     Asthma Neg Hx     Breast Cancer Neg Hx     Cancer Neg Hx     Heart Failure Neg Hx     Hypertension Neg Hx     Migraines Neg Hx     Ovarian Cancer Neg Hx     Rashes/Skin Problems Neg Hx     Seizures Neg Hx            Review of Systems      Objective     /88   Pulse 70   Temp 98.2 °F (36.8 °C)   Resp 12   Ht 5' 4.5\" (1.638 m)   Wt 236 lb (107 kg)   SpO2 98% Comment: RA  BMI 39.88 kg/m²     @LASTSAO2(3)@    Wt Readings from Last 3 Encounters:   01/09/20 236 lb (107 kg)   12/12/19 231 lb (104.8 kg)   07/17/19 233 lb (105.7 kg)       Physical Exam     NAD alert and cooperative  HEENT: Erythematous pharynx. Serous otitis. Left conjunctival injection. No photophobia. Extraocular muscles are intact. Lungs increased CANDY ratio without wheeze or rhonchi. Cardiovascular exam regular rate and rhythm no murmur click. Abdomen is obese no point tenderness. Crepitus of the knees. No peripheral edema.   No suspicious rash or nodule    Chemistry        Component Value Date/Time     06/26/2019 0938    K 4.4 06/26/2019 0938     06/26/2019 7042 CO2 25 06/26/2019 0938    BUN 12 06/26/2019 0938    CREATININE 0.7 06/26/2019 0938        Component Value Date/Time    CALCIUM 9.7 06/26/2019 0938    ALKPHOS 49 06/26/2019 0938    AST 11 (L) 06/26/2019 0938    ALT 10 06/26/2019 0938    BILITOT 0.5 06/26/2019 0938            Lab Results   Component Value Date    WBC 8.6 04/14/2015    HGB 13.2 04/14/2015    HCT 38.7 04/14/2015    MCV 87.8 04/14/2015     04/14/2015     Lab Results   Component Value Date    LABA1C 5.6 12/12/2019     Lab Results   Component Value Date    .0 06/14/2018     Lab Results   Component Value Date    LABA1C 5.6 12/12/2019     No components found for: CHLPL  Lab Results   Component Value Date    TRIG 119 06/05/2017    TRIG 101 07/09/2016    TRIG 120 06/28/2015     Lab Results   Component Value Date    HDL 52 06/26/2019    HDL 64 (H) 06/14/2018    HDL 48 06/05/2017     Lab Results   Component Value Date    LDLCALC 85 06/26/2019    LDLCALC 130 (H) 06/14/2018    LDLCALC 116 (H) 06/05/2017     Lab Results   Component Value Date    LABVLDL 17 06/26/2019    LABVLDL 22 06/14/2018    LABVLDL 24 06/05/2017       Old labs and records reviewed or requested  Discussed past lab and studies with patient      Diagnosis Orders   1. Conjunctivitis of left eye, unspecified conjunctivitis type     2. Viral URI     3. Acute sinusitis, recurrence not specified, unspecified location     4. Bronchitis     5. Obesity (BMI 30-39. 9)       The counter medications, Zithromax, eyedrops, fluids. Pavithra getting back on exercise program and diet. Doing well with Cymbalta. Continue for arthritis pain      No follow-ups on file. Diagnosis and treatment discussed.   Possible side effects of medication reviewed  Patients questions answered  Follow up understood  Pt aware if they are not contacted about any test results , this does not mean they are normal.  They should call

## 2020-01-09 NOTE — LETTER
[address] [address]  Phone: [phone]  Fax: [phone]    Liberty Romano MD        January 9, 2020     Patient: Elsie Patel   YOB: 1965   Date of Visit: 1/9/2020       To Whom It May Concern:     Paulina Fabian was seen today with illness and should not return to work prior to 1.13.2020        Sincerely,        Liberty Romano MD

## 2020-04-29 RX ORDER — MELOXICAM 15 MG/1
TABLET ORAL
Qty: 90 TABLET | Refills: 2 | Status: SHIPPED | OUTPATIENT
Start: 2020-04-29 | End: 2021-02-25 | Stop reason: ALTCHOICE

## 2020-06-05 ENCOUNTER — VIRTUAL VISIT (OUTPATIENT)
Dept: FAMILY MEDICINE CLINIC | Age: 55
End: 2020-06-05
Payer: COMMERCIAL

## 2020-06-05 PROCEDURE — 99213 OFFICE O/P EST LOW 20 MIN: CPT | Performed by: FAMILY MEDICINE

## 2020-06-05 RX ORDER — AMOXICILLIN AND CLAVULANATE POTASSIUM 875; 125 MG/1; MG/1
1 TABLET, FILM COATED ORAL 2 TIMES DAILY
Qty: 14 TABLET | Refills: 0 | Status: SHIPPED | OUTPATIENT
Start: 2020-06-05 | End: 2020-06-12

## 2020-06-05 RX ORDER — PREDNISONE 10 MG/1
10 TABLET ORAL 2 TIMES DAILY
Qty: 10 TABLET | Refills: 0 | Status: SHIPPED | OUTPATIENT
Start: 2020-06-05 | End: 2020-06-10

## 2020-06-05 RX ORDER — FLUCONAZOLE 150 MG/1
150 TABLET ORAL DAILY
Qty: 3 TABLET | Refills: 0 | Status: SHIPPED | OUTPATIENT
Start: 2020-06-05 | End: 2020-09-03

## 2020-08-05 RX ORDER — SIMVASTATIN 40 MG
TABLET ORAL
Qty: 90 TABLET | Refills: 0 | Status: SHIPPED | OUTPATIENT
Start: 2020-08-05 | End: 2020-11-20

## 2020-08-06 ENCOUNTER — EMPLOYEE WELLNESS (OUTPATIENT)
Dept: OTHER | Age: 55
End: 2020-08-06

## 2020-08-06 LAB
CHOLESTEROL, TOTAL: 180 MG/DL (ref 0–199)
GLUCOSE BLD-MCNC: 98 MG/DL (ref 70–99)
HDLC SERPL-MCNC: 56 MG/DL (ref 40–60)
LDL CHOLESTEROL CALCULATED: 107 MG/DL
TRIGL SERPL-MCNC: 84 MG/DL (ref 0–150)

## 2020-09-03 ENCOUNTER — OFFICE VISIT (OUTPATIENT)
Dept: FAMILY MEDICINE CLINIC | Age: 55
End: 2020-09-03
Payer: COMMERCIAL

## 2020-09-03 VITALS
HEART RATE: 67 BPM | DIASTOLIC BLOOD PRESSURE: 86 MMHG | HEIGHT: 65 IN | RESPIRATION RATE: 16 BRPM | BODY MASS INDEX: 39.49 KG/M2 | SYSTOLIC BLOOD PRESSURE: 127 MMHG | WEIGHT: 237 LBS

## 2020-09-03 PROBLEM — R03.0 ELEVATED BLOOD PRESSURE READING: Status: ACTIVE | Noted: 2020-09-03

## 2020-09-03 PROCEDURE — 99396 PREV VISIT EST AGE 40-64: CPT | Performed by: INTERNAL MEDICINE

## 2020-09-03 NOTE — PATIENT INSTRUCTIONS
Increase cymbalta to  60 mg daily ( 2 pills) and email me with results  I can then order 60 mg to pharm  If not improved can order MRI or ortho follow up   Check on tdap  Film left hand and follow up with Dr KEN MEDEIROS [facility]  Patient Education        Learning About How to Have a Healthy Back  What causes back pain? Back pain is often caused by overuse, strain, or injury. For example, people often hurt their backs playing sports or working in the yard, being jolted in a car accident, or lifting something too heavy. Aging plays a part too. Your bones and muscles tend to lose strength as you age, which makes injury more likely. The spongy discs between the bones of the spine (vertebrae) may suffer from wear and tear and no longer provide enough cushion between the bones. A disc that bulges or breaks open (herniated disc) can press on nerves, causing back pain. In some people, back pain is the result of arthritis, broken vertebrae caused by bone loss (osteoporosis), illness, or a spine problem. Although most people have back pain at one time or another, there are steps you can take to make it less likely. How can you have a healthy back? Reduce stress on your back through good posture   Slumping or slouching alone may not cause low back pain. But after the back has been strained or injured, bad posture can make pain worse. · Sleep in a position that maintains your back's normal curves and on a mattress that feels comfortable. Sleep on your side with a pillow between your knees, or sleep on your back with a pillow under your knees. These positions can reduce strain on your back. · Stand and sit up straight. \"Good posture\" generally means your ears, shoulders, and hips are in a straight line. · If you must stand for a long time, put one foot on a stool, ledge, or box. Switch feet every now and then.   · Sit in a chair that is low enough to let you place both feet flat on the floor with both knees nearly level with your hips. If your chair or desk is too high, use a footrest to raise your knees. Place a small pillow, a rolled-up towel, or a lumbar roll in the curve of your back if you need extra support. · Try a kneeling chair, which helps tilt your hips forward. This takes pressure off your lower back. · Try sitting on an exercise ball. It can rock from side to side, which helps keep your back loose. · When driving, keep your knees nearly level with your hips. Sit straight, and drive with both hands on the steering wheel. Your arms should be in a slightly bent position. Reduce stress on your back through careful lifting   · Squat down, bending at the hips and knees only. If you need to, put one knee to the floor and extend your other knee in front of you, bent at a right angle (half kneeling). · Press your chest straight forward. This helps keep your upper back straight while keeping a slight arch in your low back. · Hold the load as close to your body as possible, at the level of your belly button (navel). · Use your feet to change direction, taking small steps. · Lead with your hips as you change direction. Keep your shoulders in line with your hips as you move. · Set down your load carefully, squatting with your knees and hips only. Exercise and stretch your back   · Do some exercise on most days of the week, if your doctor says it is okay. You can walk, run, swim, or cycle. · Stretch your back muscles. Here are a few exercises to try:  ? Lie on your back, and gently pull one bent knee to your chest. Put that foot back on the floor, and then pull the other knee to your chest.  ? Do pelvic tilts. Lie on your back with your knees bent. Tighten your stomach muscles. Pull your belly button (navel) in and up toward your ribs. You should feel like your back is pressing to the floor and your hips and pelvis are slightly lifting off the floor. Hold for 6 seconds while breathing smoothly. ?  Sit with your back flat against a wall.  · Keep your core muscles strong. The muscles of your back, belly (abdomen), and buttocks support your spine. ? Pull in your belly and imagine pulling your navel toward your spine. Hold this for 6 seconds, then relax. Remember to keep breathing normally as you tense your muscles. ? Do curl-ups. Always do them with your knees bent. Keep your low back on the floor, and curl your shoulders toward your knees using a smooth, slow motion. Keep your arms folded across your chest. If this bothers your neck, try putting your hands behind your neck (not your head), with your elbows spread apart. ? Lie on your back with your knees bent and your feet flat on the floor. Tighten your belly muscles, and then push with your feet and raise your buttocks up a few inches. Hold this position 6 seconds as you continue to breathe normally, then lower yourself slowly to the floor. Repeat 8 to 12 times. ? If you like group exercise, try Pilates or yoga. These classes have poses that strengthen the core muscles. Lead a healthy lifestyle   · Stay at a healthy weight to avoid strain on your back. · Do not smoke. Smoking increases the risk of osteoporosis, which weakens the spine. If you need help quitting, talk to your doctor about stop-smoking programs and medicines. These can increase your chances of quitting for good. Where can you learn more? Go to https://Meridiumpekashifeb.BlueWhale. org and sign in to your [employer] account. Enter L315 in the [facility] box to learn more about \"Learning About How to Have a Healthy Back. \"     If you do not have an account, please click on the \"Sign Up Now\" link. Current as of: March 2, 2020               Content Version: 12.5  © 3090-5763 [employer]. Care instructions adapted under license by [facility] ([facility]).  If you have questions about a medical condition or this instruction, always ask your healthcare professional. Mickie Taylor disclaims any warranty or liability for your use of this information. Patient Education        Well Visit, Women 48 to 72: Care Instructions  Your Care Instructions     Physical exams can help you stay healthy. Your doctor has checked your overall health and may have suggested ways to take good care of yourself. He or she also may have recommended tests. At home, you can help prevent illness with healthy eating, regular exercise, and other steps. Follow-up care is a key part of your treatment and safety. Be sure to make and go to all appointments, and call your doctor if you are having problems. It's also a good idea to know your test results and keep a list of the medicines you take. How can you care for yourself at home? · Reach and stay at a healthy weight. This will lower your risk for many problems, such as obesity, diabetes, heart disease, and high blood pressure. · Get at least 30 minutes of exercise on most days of the week. Walking is a good choice. You also may want to do other activities, such as running, swimming, cycling, or playing tennis or team sports. · Do not smoke. Smoking can make health problems worse. If you need help quitting, talk to your doctor about stop-smoking programs and medicines. These can increase your chances of quitting for good. · Protect your skin from too much sun. When you're outdoors from 10 a.m. to 4 p.m., stay in the shade or cover up with clothing and a hat with a wide brim. Wear sunglasses that block UV rays. Even when it's cloudy, put broad-spectrum sunscreen (SPF 30 or higher) on any exposed skin. · See a dentist one or two times a year for checkups and to have your teeth cleaned. · Wear a seat belt in the car. Follow your doctor's advice about when to have certain tests. These tests can spot problems early. · Cholesterol.  Your doctor will tell you how often to have this done based on your age, family history, or other things that can increase your risk for heart attack and stroke. · Blood pressure. Have your blood pressure checked during a routine doctor visit. Your doctor will tell you how often to check your blood pressure based on your age, your blood pressure results, and other factors. · Mammogram. Ask your doctor how often you should have a mammogram, which is an X-ray of your breasts. A mammogram can spot breast cancer before it can be felt and when it is easiest to treat. · Pap test and pelvic exam. Ask your doctor how often you should have a Pap test. You may not need to have a Pap test as often as you used to. · Vision. Have your eyes checked every year or two or as often as your doctor suggests. Some experts recommend that you have yearly exams for glaucoma and other age-related eye problems starting at age 48. · Hearing. Tell your doctor if you notice any change in your hearing. You can have tests to find out how well you hear. · Diabetes. Ask your doctor whether you should have tests for diabetes. · Colorectal cancer. Your risk for colorectal cancer gets higher as you get older. Some experts say that adults should start regular screening at age 48 and stop at age 76. Others say to start before age 48 or continue after age 76. Talk with your doctor about your risk and when to start and stop screening. · Thyroid disease. Talk to your doctor about whether to have your thyroid checked as part of a regular physical exam. Women have an increased chance of a thyroid problem. · Osteoporosis. You should begin tests for bone density at age 72. If you are younger than 72, ask your doctor whether you have factors that may increase your risk for this disease. You may want to have this test before age 72. · Heart attack and stroke risk. At least every 4 to 6 years, you should have your risk for heart attack and stroke assessed.  Your doctor uses factors such as your age, blood pressure, cholesterol, and whether you smoke or have diabetes to show what your risk for a heart

## 2020-09-03 NOTE — PROGRESS NOTES
.  History and Physical      Delmy Dean  YOB: 1965    Date of Service:  9/3/2020    Chief Complaint:   Delmy Dean is a 60 y.o. female who presents for complete physical examination. Chronic health issues include elevated BMI, chronic arthralgias, intermittent rhinitis, family history of myocardial infarction      Generalized osteoarthritis. Stretching. Significant improvement with Cymbalta 30%. 2016 film with multilevel spondylolysis and facet hypertrophy is most significant at L4-5.   + muscle tightness and pain. No radiation. Positive DJD of the hips. Intermittent Mobic.     BMI 39. Transient use of weight watchers exercise and . GE reflux and knee pain. No known apnea.          Intermittent rhinitis improved with Zyrtec and Flonase.       Strong family history of myocardial infarction. Denies any  new exercise intolerance. Takes a statin 40 mg daily. Aspirin daily. . Echocardiogram  EF 66%. Trivial mitral regurgitation. Trivial pulmonic regurgitation. No chest pain or increased exercise intolerance Occasional palpitations when she drinks coffee         . no family history of breast cancer.  mammogram up-to-date.  Does breast exams. . Is on estrogen.  No std concerns. Taking estrogen and progesterone replacement with GYN.   Continued menses.      PMH:  , tubal ligation, shoulder surgery, knee arthroscopy, tendon repair        SH:  ( 3 outside). Pre post cath lab. [employer] her job. . No tobacco.  Rare alcohol. No drugs, drugs. exercises weekly. Has a . No STD concerns.  No domestic violence. Wears sunscreen. Goes to the gym 60 minutes 3 times weekly.     FH    + HTN, prediabetic and morbid obesity, drowning, ALS father,  sleep apnea and mother,maternal aunt ovarian cancer     -colon, depression, PGM depression requiring triple therapy     Review of systems: Up-to-date with eye exams. Wears glasses dental exams up-to-date.  No concussions or seizures. Denies any wheezing pneumonias or abnormal chest x-rays other than one episode of bronchiolitis when she worked at [employer]. No chest pain or palpitations when not drinking coffee. . No syncope. No breast lumps or abnormalities.  rare GE reflux. No bloody stools kidney stones stress in his intolerance of urine. Remotely abnormal Pap smear GYN.  Continuing menses hormone replacement. Follow-up December 2022. .  Positive arthralgias. Sees orthopedist. Has had knee injections. .Skin cancers. Denies any depression or anxiety. No known sleep apnea. Back pain     Wt Readings from Last 3 Encounters:   08/06/20 239 lb (108.4 kg)   01/09/20 236 lb (107 kg)   12/12/19 231 lb (104.8 kg)     BP Readings from Last 3 Encounters:   01/09/20 127/88   12/12/19 136/85   07/17/19 128/80       Patient Active Problem List   Diagnosis    Allergic rhinitis    HLD (hyperlipidemia)    FH: MI in first degree male relative    FH: Chelsey Gehrig's disease    S/P arthroscopy of left shoulder, debridement, [place], SAD and Biceps tenotomy 5/05/2017    Obesity (BMI 30-39. 9)    S/P left knee arthroscopy, chondroplasty and synovectomy 5/31/2017    Low back pain    Primary osteoarthritis involving multiple joints       Preventive Care:  Health Maintenance   Topic Date Due    DTaP/Tdap/Td vaccine (2 - Td) 12/01/2019    Flu vaccine (1) 09/01/2020    Lipid screen  08/06/2021    Breast cancer screen  11/08/2021    Colon cancer screen fecal DNA test (Cologuard)  08/05/2022    Cervical cancer screen  03/18/2024    Shingles Vaccine  Completed    Hepatitis C screen  Completed    HIV screen  Completed    Hepatitis A vaccine  Aged Out    Hepatitis B vaccine  Aged Out    Hib vaccine  Aged Out    Meningococcal (ACWY) vaccine  Aged Out    Pneumococcal 0-64 years Vaccine  Aged Out      Hx abnormal PAP: abnormal cells with repeat normal  Sexual activity: single partner, contraception - tubal ligation   Self-breast exams: yes  Previous DEXA scan: no  Last eye exam: 2.2019, mom glaucoma  Exercise: gym 2-3 times weekly classes. 58 with weights   Seatbelt use: yes  suscreen , no   Lipid panel:   Lab Results   Component Value Date    CHOL 180 08/06/2020    TRIG 84 08/06/2020    HDL 56 08/06/2020    LDLCALC 107 (H) 08/06/2020        Living will:  Does not want    Immunization History   Administered Date(s) Administered    Influenza 10/01/2015    Influenza Vaccine, unspecified formulation 10/01/2016    Influenza Virus Vaccine 09/28/2011, 10/04/2017, 10/02/2018, 10/04/2019    Tdap (Boostrix, Adacel) 12/01/2009    Zoster Recombinant (Shingrix) 07/30/2019, 10/29/2019       No Known Allergies  Outpatient Medications Marked as Taking for the 9/3/20 encounter (Office Visit) with Jose Guadalupe Acosta MD   Medication Sig Dispense Refill    simvastatin (ZOCOR) 40 MG tablet 1 po q 24 hours for cholesterol 90 tablet 0    meloxicam (MOBIC) 15 MG tablet TAKE 1 TABLET BY MOUTH ONE TIME A DAY WITH FOOD 90 tablet 2    progesterone (PROMETRIUM) 100 MG capsule TAKE ONE TABLET BY MOUTH NIGHTLY 90 capsule 1    DULoxetine (CYMBALTA) 30 MG extended release capsule TAKE 1 CAPSULE BY MOUTH ONE TIME A DAY AS NEEDED FOR PAIN 90 capsule 1    estradiol (ESTRACE) 2 MG tablet Take 1 tablet by mouth daily 90 tablet 3    fluticasone (FLONASE) 50 MCG/ACT nasal spray 1 spray by Nasal route daily 1 Bottle 3    tiZANidine (ZANAFLEX) 4 MG tablet 1 po q hs for muscle pain 90 tablet 1    acetaminophen (APAP EXTRA STRENGTH) 500 MG tablet 1 po bid prn 120 tablet 3    omeprazole (PRILOSEC OTC) 20 MG tablet Take 20 mg by mouth daily as needed.       cetirizine (ZYRTEC) 10 MG tablet Take 10 mg by mouth nightly          Past Medical History:   Diagnosis Date    Allergic rhinitis     Hyperlipidemia     Medial meniscus tear     PONV (postoperative nausea and vomiting)     Scop patch worked well    Primary osteoarthritis involving multiple joints 6/26/2019 Past Surgical History:   Procedure Laterality Date    FOOT SURGERY  4/2015    rt tendon repair    KNEE ARTHROSCOPY Left 05/31/2017    OTHER SURGICAL HISTORY Right 4/22/2015    Secondary repair of peroneus brevis tendon tear R    SHOULDER SURGERY Left 05/05/2017    TUBAL LIGATION  1993       Physical Exam:   Vitals:    09/03/20 0807   BP: 127/86   Pulse: 67   Resp: 16   Weight: 237 lb (107.5 kg)   Height: 5' 4.5\" (1.638 m)     There is no height or weight on file to calculate BMI. Constitutional: She is oriented to person, place, and time. She appears well-developed and well-nourished. No distress. HEENT: Small hypopharynx. Tonsils present. Good dentition. TMs unremarkable. Pink conjunctive. No neck masses no bruits. .  Lungs are clear. Good CANDY ratio without any wheezes rales or rhonchi. Breast pendulous without any masses or discharge. Abdomen is obese no hepatosplenomegaly epigastric tenderness or suprapubic mass. Good range of motion the hips. Good  Strength the lower extremities. No suspicious skin lesions or nodules. Swelling PIP fourth finger however good range of motion     assessment/Plan:     Diagnosis Orders   1. Encounter for general adult medical examination with abnormal findings     2. Primary osteoarthritis involving multiple joints  XR FINGER LEFT (MIN 2 VIEWS)   3. Finger joint swelling, left  Fadumo Salamanca MD, Hand Surgery (Hand, Wrist, Upper Extremity), [facility]   4. Elevated blood pressure reading     5. Spondylosis of lumbar joint       Osteoarthritis. Back pain. Will increase her Cymbalta as this was a benefit in the past.  If not improved consider MRI or follow-up. Joint swelling. Follow-up hand film to be obtained possible injection. Elevated diastolic. Exercise, low-salt diet, low nonsteroidal use. Elevated BMI. Discussed weight loss.

## 2020-09-10 ENCOUNTER — HOSPITAL ENCOUNTER (OUTPATIENT)
Age: 55
Discharge: HOME | End: 2020-09-10
Payer: COMMERCIAL

## 2020-09-10 ENCOUNTER — HOSPITAL ENCOUNTER (OUTPATIENT)
Dept: GENERAL RADIOLOGY | Age: 55
Discharge: HOME | End: 2020-09-10
Payer: COMMERCIAL

## 2020-09-10 ENCOUNTER — PATIENT MESSAGE (OUTPATIENT)
Dept: FAMILY MEDICINE CLINIC | Age: 55
End: 2020-09-10

## 2020-09-10 PROCEDURE — 73140 X-RAY EXAM OF FINGER(S): CPT

## 2020-09-10 RX ORDER — DULOXETIN HYDROCHLORIDE 60 MG/1
CAPSULE, DELAYED RELEASE ORAL
Qty: 90 CAPSULE | Refills: 1 | Status: SHIPPED | OUTPATIENT
Start: 2020-09-10 | End: 2021-05-13

## 2020-09-10 NOTE — TELEPHONE ENCOUNTER
From: Summer Julian  Sent: 9/10/2020 4:29 PM EDT  To: Jael Melo MD  Subject: RE:Results    ----- Message from Reji Arreguin [facility] sent at 9/10/2020 4:29 PM EDT -----       ----- Message from Sky Pichardo \"Janneth\" to Sofia Jorgensen MA sent at 9/10/2020 3:52 PM -----   Isabel Archer thanks. Can you let Dr Francine Kawasaki know that the increase in the Cymbalta has helped a little bit by afternoon and evening the back pain returns and is very stiff feeling. She said to let her know. Im almost out of the cymbalta. She had mention a possible referral to a spine doctor. I was wondering if she could refer me to Dr. Julia Montes at [facility]?    Thank you    Jeet Enamorado      ----- Message -----   From:LAZARO Osborn   Sent:9/10/2020 2:53 PM EDT   To:Mellissa Biddle   Subject:Results    Good afternoon Janneth,    Your test results are in. I have copied Dr. Francine Kawasaki' note below. Jael Melo MD [facility] Staff            Arthritis finger. No tumor look forward to follow up with cortes gonzáles.          Thank you,    Chelsy Mccallum

## 2020-09-11 RX ORDER — SIMVASTATIN 40 MG
TABLET ORAL
Qty: 90 TABLET | Refills: 0 | OUTPATIENT
Start: 2020-09-11

## 2020-09-21 ENCOUNTER — OFFICE VISIT (OUTPATIENT)
Dept: ORTHOPEDIC SURGERY | Age: 55
End: 2020-09-21
Payer: COMMERCIAL

## 2020-09-21 VITALS — HEIGHT: 65 IN | BODY MASS INDEX: 39.49 KG/M2 | RESPIRATION RATE: 16 BRPM | TEMPERATURE: 97.3 F | WEIGHT: 237 LBS

## 2020-09-21 PROCEDURE — 99243 OFF/OP CNSLTJ NEW/EST LOW 30: CPT | Performed by: ORTHOPAEDIC SURGERY

## 2020-09-21 NOTE — PROGRESS NOTES
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    She is seen today regarding a 2 month(s) history of left Ring Finger pain, stiffness, swelling and enlargement without history previous of injury. She was seen for this concern by her primary care physician; previous treatment has included conservative measures. She reports mild pain, stiffness, swelling and enlargement located in the Left Ring Finger PIP Joint, no tenderness of the remaining hand, wrist, or elbow. She  notes today, no neurologic symptoms in the hand. Symptoms show no change over time. The patient's , past medical history, medications, allergies,  family history, social history, and review of systems have been updated, reviewed and have been scanned into the chart today. Physical Exam:  Ms. Latanya Godoy most recent vitals:  Vitals  Temp: 97.3 °F (36.3 °C)  Temp Source: Infrared  Resp: 16  Height: 5' 4.5\" (163.8 cm)  Weight: 237 lb (107.5 kg)    She is well nourished, oriented to person, place & time. She demonstrates appropriate mood and affect as well as normal gait and station. Skin: Normal in appearance, Normal Color and Free of Lesions Bilaterally   Digital range of motion is without significant limitation but somewhat uncomfortable Ring Finger PIP Joint on the Left, normal on the Right and otherwise normal bilaterally  Wrist range of motion is equal bilaterally . There is no evidence of gross joint instability bilaterally. Sensation is subjectively normal in the Whole Hand bilaterally. Vascular examination reveals normal and good capillary refill bilaterally.   Swelling is mild in the Ring Finger PIP Joint on the Left, normal on the Right  Maximal pain is elicited with palpation of the Ring Finger PIP Joint on the Left, normal on the Right  There is a 0 degree angular deformity and no clinical evidence of  mal-rotation of the symptomatic digit. Other digits are not similarly effected bilaterally. The base of the hand & wrist are not tender to palpation bilaterally  Muscular strength is clinically appropriate bilaterally. Examination for Stenosing Tenosynovitis demonstrates no evidence of tenderness, thickening or nodularity at the A-1 pulleys of the digits bilaterally. There no palpable triggering or any finger or thumb. Examination of the first dorsal extensor compartments of the wrist demonstrates no thickening or fullness. There is no tenderness to palpation over the extensor tendons. Pain is not elicited with resisted thumb extension, and Finklestein's maneuver is negative bilaterally. Radiographic Evaluation:  Radiographs are reviewed  today (3 views of the left hand). They demonstrate no evidence of an acute fracture. There is mild osteoarthritis of the Ring Finger PIP Joint with mild joint narrowing and mild osteophyte formation, & 0 degrees of angular deformity. There is not evidence of other injury or bony fracture. Impression:  Ms. Daniela Carter has developed degenerative osteoarthritis of the Ring Finger and presents requesting further treatment. Plan:    I have had a thorough discussion with Ms. Daniela Carter regarding the treatment options available for her initially presenting left Ring Finger Proximal Interphalangeal Joint osteoarthritis, which is causing her significant symptoms and difficulty. I have outlined for Ms. Daniela Carter the risk, benefits and consequences of the various treatment modalities, including a reasonable expectation for the long term success of each. We have discussed the likelihood that further, more aggressive treatment may be required for her current presenting condition.   Based upon our current discussion and a reasonable understating of the options available to her, Ms. Daniela Carter has selected to proceed with a conservative plan of treatment consisting of: the use of protective splints as needed, activity modification, and the judicious use of over-the-counter anti-inflammatory medications if allowed by her  primary care physician. An appropriately sized protective finger splint was offered and declined. Instructions were given regarding splint use and wear as well as suggestions for use of the other modalities were discussed. I have clearly explained to her that the above outlined treatment plan should not be expected to 'cure' her osteoarthritis, but we are rather treating the symptoms with which she presents. She has understood that in order to achieve more durable relief of her symptoms and to prevent future worsening or further damage, that definitive surgical treatment would be an option. Ms. Rachelle Arana  voiced an appropriate understanding of our discussion, the options available to her, and of the expectations of her selected  treatment. I have also discussed with Ms. Rachelle Arana  the other treatment options available to her  for this condition. We have today selected to proceed with conservative management. She and I have agreed that if our current course of conservative treatment does not prove to be effective over the short term future, that she will schedule a follow-up appointment to discuss and select an alternate course of therapy including possibly injection or surgical treatment. Ms. Rachelle Arana has been given a full verbal list of instructions and precautions related to her present condition. I have asked her to followup with me in the office at the prescribed time. She is also specifically requested to call or return to the office sooner if her symptoms change or worsen prior to the next scheduled appointment.

## 2020-09-21 NOTE — PATIENT INSTRUCTIONS
Thank you for choosing [facility]icians for your Hand and Upper Extremity needs. If we can be of any further assistance to you, please do not hesitate to contact us.     Office Phone Number:  [phone]  or  [phone]

## 2020-10-05 ENCOUNTER — TELEPHONE (OUTPATIENT)
Dept: GYNECOLOGY | Age: 55
End: 2020-10-05

## 2020-10-05 NOTE — TELEPHONE ENCOUNTER
Patient advised and gave her number to call scheduling, and we will go from there, order placed as recommended DONE

## 2020-10-05 NOTE — TELEPHONE ENCOUNTER
Please ask patient how long it has been she bled before September-? A few years? Please schedule her for a pelvic ultrasound. Tell her I would like to see the results of this, then we can get her in to make a plan with what needs to be done.

## 2020-10-08 ENCOUNTER — HOSPITAL ENCOUNTER (OUTPATIENT)
Dept: ULTRASOUND IMAGING | Age: 55
Discharge: HOME | End: 2020-10-08
Payer: COMMERCIAL

## 2020-10-08 PROCEDURE — 76830 TRANSVAGINAL US NON-OB: CPT

## 2020-10-08 PROCEDURE — 76856 US EXAM PELVIC COMPLETE: CPT

## 2020-10-13 ENCOUNTER — TELEPHONE (OUTPATIENT)
Dept: GYNECOLOGY | Age: 55
End: 2020-10-13

## 2020-10-13 NOTE — TELEPHONE ENCOUNTER
If that 9 am is still open on 10/15/20, we could do that. I think we were waiting on another patient to call back for this. If that does not work, add on to 11/6/20.

## 2020-10-13 NOTE — TELEPHONE ENCOUNTER
Called and spoke to patient, gave her results of her [address]. Also explained Endometrial BX to patient. Explained to patient on scheduling appointments during  the pandemic patient stated she understood also told patient that I send Dr Miguel Ángel George a message as to when to schedule her for the procedure. Patient stated it doesn't matter what office the Biopsy is scheduled at.

## 2020-10-15 ENCOUNTER — OFFICE VISIT (OUTPATIENT)
Dept: GYNECOLOGY | Age: 55
End: 2020-10-15
Payer: COMMERCIAL

## 2020-10-15 VITALS
DIASTOLIC BLOOD PRESSURE: 86 MMHG | WEIGHT: 236.4 LBS | SYSTOLIC BLOOD PRESSURE: 133 MMHG | HEART RATE: 65 BPM | HEIGHT: 64 IN | RESPIRATION RATE: 17 BRPM | BODY MASS INDEX: 40.36 KG/M2

## 2020-10-15 PROCEDURE — 99396 PREV VISIT EST AGE 40-64: CPT | Performed by: OBSTETRICS & GYNECOLOGY

## 2020-10-18 ASSESSMENT — ENCOUNTER SYMPTOMS
RESPIRATORY NEGATIVE: 1
EYES NEGATIVE: 1
GASTROINTESTINAL NEGATIVE: 1

## 2020-10-19 VITALS — WEIGHT: 239 LBS | BODY MASS INDEX: 40.39 KG/M2

## 2020-10-20 PROBLEM — N92.4 ABNORMAL PERIMENOPAUSAL BLEEDING: Status: ACTIVE | Noted: 2020-10-20

## 2020-10-22 ENCOUNTER — HOSPITAL ENCOUNTER (OUTPATIENT)
Dept: MRI IMAGING | Age: 55
Discharge: HOME | End: 2020-10-22
Payer: COMMERCIAL

## 2020-10-22 PROCEDURE — 72148 MRI LUMBAR SPINE W/O DYE: CPT

## 2020-11-12 DIAGNOSIS — N95.1 PERIMENOPAUSE: ICD-10-CM

## 2020-11-12 LAB
ESTRADIOL LEVEL: 12 PG/ML
FOLLICLE STIMULATING HORMONE: 49.4 MIU/ML
PROGESTERONE LEVEL: <0.05 NG/ML

## 2020-11-14 LAB — TESTOSTERONE TOTAL: 16 NG/DL (ref 20–70)

## 2020-11-20 RX ORDER — SIMVASTATIN 40 MG
TABLET ORAL
Qty: 90 TABLET | Refills: 0 | Status: SHIPPED | OUTPATIENT
Start: 2020-11-20 | End: 2021-02-18 | Stop reason: SDUPTHER

## 2020-12-17 ENCOUNTER — HOSPITAL ENCOUNTER (OUTPATIENT)
Dept: ULTRASOUND IMAGING | Age: 55
Discharge: HOME | End: 2020-12-17
Payer: COMMERCIAL

## 2020-12-17 ENCOUNTER — APPOINTMENT (OUTPATIENT)
Dept: ULTRASOUND IMAGING | Age: 55
End: 2020-12-17
Payer: COMMERCIAL

## 2020-12-17 PROCEDURE — 76856 US EXAM PELVIC COMPLETE: CPT

## 2020-12-17 PROCEDURE — 76830 TRANSVAGINAL US NON-OB: CPT

## 2020-12-22 ENCOUNTER — HOSPITAL ENCOUNTER (OUTPATIENT)
Dept: WOMENS IMAGING | Age: 55
Discharge: HOME | End: 2020-12-22
Payer: COMMERCIAL

## 2020-12-22 PROCEDURE — 77067 SCR MAMMO BI INCL CAD: CPT

## 2021-01-06 ENCOUNTER — VIRTUAL VISIT (OUTPATIENT)
Dept: FAMILY MEDICINE CLINIC | Age: 56
End: 2021-01-06
Payer: COMMERCIAL

## 2021-01-06 DIAGNOSIS — M48.061 SPINAL STENOSIS OF LUMBAR REGION, UNSPECIFIED WHETHER NEUROGENIC CLAUDICATION PRESENT: ICD-10-CM

## 2021-01-06 DIAGNOSIS — N92.4 ABNORMAL PERIMENOPAUSAL BLEEDING: ICD-10-CM

## 2021-01-06 DIAGNOSIS — M54.6 ACUTE MIDLINE THORACIC BACK PAIN: Primary | ICD-10-CM

## 2021-01-06 PROCEDURE — 99213 OFFICE O/P EST LOW 20 MIN: CPT | Performed by: INTERNAL MEDICINE

## 2021-01-06 RX ORDER — LIDOCAINE 50 MG/G
1 PATCH TOPICAL DAILY
Qty: 30 PATCH | Refills: 0 | Status: SHIPPED | OUTPATIENT
Start: 2021-01-06 | End: 2021-02-05

## 2021-01-06 RX ORDER — METHYLPREDNISOLONE 4 MG/1
TABLET ORAL
Qty: 1 KIT | Refills: 0 | Status: SHIPPED | OUTPATIENT
Start: 2021-01-06 | End: 2021-01-12

## 2021-01-06 ASSESSMENT — PATIENT HEALTH QUESTIONNAIRE - PHQ9
SUM OF ALL RESPONSES TO PHQ QUESTIONS 1-9: 0
1. LITTLE INTEREST OR PLEASURE IN DOING THINGS: 0
SUM OF ALL RESPONSES TO PHQ9 QUESTIONS 1 & 2: 0
SUM OF ALL RESPONSES TO PHQ QUESTIONS 1-9: 0

## 2021-01-06 NOTE — PROGRESS NOTES
Generalized osteoarthritis.  Stretching.  Significant improvement with Cymbalta but currently using intermittently.  2.   + muscle tightness and pain. No radiation.  Positive DJD of the hips.  Intermittent Mobic. Evaluation at [facility] 10/2020. Physical therapy was ordered as well as MRI of the spine. Significant improvement with injections in . [facility] Rad MRI 10/2020 with stenosis foraminal narrowing levo sclerosis and loss of disc multiple areas. over the last 3 days noted the onset of pinching of her upper thoracic spine with spasm. No known trauma. No new activity. Took a Zanaflex which was helpful for sleep however not using it during the day secondary to work tried [employer] without much improvement. Wonders about steroid. Never used topical lidocaine. Does not have a tens unit. No trauma. No history of osteoporosis known. BMI 39.  Transient use of weight watchers exercise and .    GE reflux and knee pain.  No known apnea.          Intermittent rhinitis improved with Zyrtec and Flonase.       Strong family history of myocardial infarction. Denies any  new exercise intolerance. Takes a statin 40 mg daily. Aspirin daily. . Echocardiogram  EF 66%. Trivial mitral regurgitation. Trivial pulmonic regurgitation. No chest pain or increased exercise intolerance Occasional palpitations when she drinks coffee         . no family history of breast cancer.  mammogram up-to-date.  Does breast exams. . Is on estrogen.  No std concerns. Taking estrogen and progesterone replacement with GYN.   Continued menses. Increased endometrial stripe improved after discontinuation of her hormones. Did have bleeding with this. Is following with GYN.       PMH:  , tubal ligation, shoulder surgery, knee arthroscopy, tendon repair       SH:  ( 3 outside). Pre post cath lab. [employer] her job. . No tobacco.  Rare alcohol. No drugs, drugs. exercises weekly. Has a . No STD concerns.  No domestic violence. Wears sunscreen. Goes to the gym 60 minutes 3 times weekly.     FH    + HTN, prediabetic and morbid obesity, drowning, ALS father,  sleep apnea and mother,maternal aunt ovarian cancer     -colon, depression, PGM depression requiring triple therapy     Review of systems: Up-to-date with eye exams. Wears glasses dental exams up-to-date. No concussions or seizures. Denies any wheezing pneumonias or abnormal chest x-rays other than one episode of bronchiolitis when she worked at [employer]. No chest pain or palpitations when not drinking coffee. . No syncope. No breast lumps or abnormalities.  rare GE reflux. No bloody stools kidney stones stress in his intolerance of urine. Remotely abnormal Pap smear GYN.  Recently holding menses hormone replacement. Follow-up December 2022. .  Positive arthralgias. Sees orthopedist. Has had knee injections. .Skin cancers. Denies any depression or anxiety. No known sleep apnea.   Back pain      Constitutional, ent, CV, respiratory, GI, , joint, skin, allergic and psychiatric ROS reviewed and negative except for above    No Known Allergies    Outpatient Medications Marked as Taking for the 1/6/21 encounter (Virtual Visit) with Maryam Diaz MD   Medication Sig Dispense Refill    simvastatin (ZOCOR) 40 MG tablet TAKE 1 TABLET BY MOUTH ONE TIME A DAY FOR CHOLESTEROL 90 tablet 0    DULoxetine (CYMBALTA) 60 MG extended release capsule TAKE 1 CAPSULE BY MOUTH ONE TIME A DAY AS NEEDED FOR PAIN 90 capsule 1    meloxicam (MOBIC) 15 MG tablet TAKE 1 TABLET BY MOUTH ONE TIME A DAY WITH FOOD 90 tablet 2    fluticasone (FLONASE) 50 MCG/ACT nasal spray 1 spray by Nasal route daily 1 Bottle 3    tiZANidine (ZANAFLEX) 4 MG tablet 1 po q hs for muscle pain 90 tablet 1  acetaminophen (APAP EXTRA STRENGTH) 500 MG tablet 1 po bid prn 120 tablet 3    omeprazole (PRILOSEC OTC) 20 MG tablet Take 20 mg by mouth daily as needed.  cetirizine (ZYRTEC) 10 MG tablet Take 10 mg by mouth nightly                Past Medical History:   Diagnosis Date    Abnormal perimenopausal bleeding 10/20/2020    10.2020 Pap, calcium, exercise, mammogram, hemocult negative 2 and 3. PMB-stripe 7 mm which is OK on HRT. Plans for endo bx. Attempted and was unsuccessful. Will stop HRT-check labs and repeat pelvic US.                    Allergic rhinitis     BMI 40.0-44.9, adult ([facility] [address] 5/15/2017    2017 a1c 5.3, knee  Pain, hyperlipidemia    Elevated blood pressure reading 9/3/2020    Hyperlipidemia     Medial meniscus tear     PONV (postoperative nausea and vomiting)     Scop patch worked well    Primary osteoarthritis involving multiple joints 6/26/2019       Past Surgical History:   Procedure Laterality Date    FOOT SURGERY  4/2015    rt tendon repair    KNEE ARTHROSCOPY Left 05/31/2017    OTHER SURGICAL HISTORY Right 4/22/2015    Secondary repair of peroneus brevis tendon tear R    SHOULDER SURGERY Left 05/05/2017    TUBAL LIGATION  1993             Family History   Problem Relation Age of Onset    High Blood Pressure Mother     Arthritis Mother     Heart Disease Father     High Blood Pressure Brother     Diabetes Brother     Heart Attack Brother     Heart Disease Maternal Grandmother     Stroke Paternal Grandmother     Heart Disease Paternal Grandfather     High Blood Pressure Brother     Depression Sister     Thyroid Disease Sister     High Cholesterol Sister     Depression Sister     High Cholesterol Sister     Rheum Arthritis Neg Hx     Osteoarthritis Neg Hx     Asthma Neg Hx     Breast Cancer Neg Hx     Cancer Neg Hx     Heart Failure Neg Hx     Hypertension Neg Hx     Migraines Neg Hx     Ovarian Cancer Neg Hx     Rashes/Skin Problems Neg Hx  Seizures Neg Hx          Review of Systems      Chemistry        Component Value Date/Time     06/26/2019 0938    K 4.4 06/26/2019 0938     06/26/2019 0938    CO2 25 06/26/2019 0938    BUN 12 06/26/2019 0938    CREATININE 0.7 06/26/2019 0938        Component Value Date/Time    CALCIUM 9.7 06/26/2019 0938    ALKPHOS 49 06/26/2019 0938    AST 11 (L) 06/26/2019 0938    ALT 10 06/26/2019 0938    BILITOT 0.5 06/26/2019 0938            NO vitals  as this was a virtual visit during cvod19 pandemic  He is appropriate. Seated. Well-groomed. Lab Results   Component Value Date    WBC 8.6 04/14/2015    HGB 13.2 04/14/2015    HCT 38.7 04/14/2015    MCV 87.8 04/14/2015     04/14/2015     Lab Results   Component Value Date    LABA1C 5.6 12/12/2019     Lab Results   Component Value Date    .0 06/14/2018     Lab Results   Component Value Date    LABA1C 5.6 12/12/2019     No components found for: CHLPL  Lab Results   Component Value Date    TRIG 84 08/06/2020    TRIG 119 06/05/2017    TRIG 101 07/09/2016     Lab Results   Component Value Date    HDL 56 08/06/2020    HDL 52 06/26/2019    HDL 64 (H) 06/14/2018     Lab Results   Component Value Date    LDLCALC 107 (H) 08/06/2020    LDLCALC 85 06/26/2019    LDLCALC 130 (H) 06/14/2018     Lab Results   Component Value Date    LABVLDL 17 06/26/2019    LABVLDL 22 06/14/2018    LABVLDL 24 06/05/2017       Old labs and records reviewed or requested  Discussed past lab and studies with patient        Diagnosis Orders   1. Acute midline thoracic back pain     2. Abnormal perimenopausal bleeding     3. Spinal stenosis of lumbar region, unspecified whether neurogenic claudication present       Upper back pain most likely DJD as this is seen throughout her spine and other areas. We will give steroid pack muscle relaxer home heat ice topical lidocaine film if not improving or if worrisome symptoms. Postmenopausal bleeding with increased endometrial stripe improved off of hormones. Will be repeating pelvic ultrasound. Elevated blood pressure in the past.  States is been good since she has been off the meloxicam.        No follow-ups on file. Diagnosis and treatment discussed.   Possible side effects of medication reviewed  Patients questions answered  Follow up understood  Pt aware if they are not contacted about any test results , this does not mean they are normal.  They should call

## 2021-01-06 NOTE — LETTER
[address] [place] [address]  Phone: [phone]  Fax: [phone]    Georgia Cardona MD        January 6, 2021     Patient: Elida Cox   YOB: 1965   Date of Visit: 1/6/2021       To Whom It May Concern: It is my medical opinion that Rachael Paul was seen today with illness and may return to work 1/8/21.         Sincerely,          Georgia Cardona MD

## 2021-01-20 ENCOUNTER — TELEPHONE (OUTPATIENT)
Dept: GYNECOLOGY | Age: 56
End: 2021-01-20

## 2021-01-20 NOTE — TELEPHONE ENCOUNTER
See if patient can do a virtual visit on Thursday-I am waiting to see if someone else is taking one of the other appointments.  I will either have 9:40 am or 2 pm on Thursday 1/21/21

## 2021-01-20 NOTE — TELEPHONE ENCOUNTER
Patient called into office stating that she wanted to follow up with Dr Sasha Burns on a date for a telephone and or a virtual visit to discuss surgery for the bleeding she has been having. Patient says that the blood is bright red. She is still bleeding and also states she feels tired. She is not filling up a pad per hour. Patient is available to speak with Dr Sasha Burns any day. Patient states that she is off on Thursdays. Patient contact number is [phone].

## 2021-01-21 ENCOUNTER — TELEPHONE (OUTPATIENT)
Dept: GYNECOLOGY | Age: 56
End: 2021-01-21

## 2021-01-21 ENCOUNTER — VIRTUAL VISIT (OUTPATIENT)
Dept: GYNECOLOGY | Age: 56
End: 2021-01-21
Payer: COMMERCIAL

## 2021-01-21 DIAGNOSIS — N92.4 ABNORMAL PERIMENOPAUSAL BLEEDING: Primary | ICD-10-CM

## 2021-01-21 PROCEDURE — 99213 OFFICE O/P EST LOW 20 MIN: CPT | Performed by: OBSTETRICS & GYNECOLOGY

## 2021-01-21 RX ORDER — MEDROXYPROGESTERONE ACETATE 10 MG/1
20 TABLET ORAL DAILY
Qty: 60 TABLET | Refills: 3 | Status: SHIPPED | OUTPATIENT
Start: 2021-01-21 | End: 2021-02-25 | Stop reason: ALTCHOICE

## 2021-01-21 NOTE — TELEPHONE ENCOUNTER
I called patient, left a message for a return call to go over her pre- charting for her virtual visit for her 9:40am appointment.

## 2021-01-21 NOTE — TELEPHONE ENCOUNTER
Patient can do 9:40 am. Can you please call her and do what you need to get her ready for virtual visit.

## 2021-01-21 NOTE — TELEPHONE ENCOUNTER
Patient returned call, went over pre- charting for virtual visit for 1/21/2021 at 9:40 am with Dr Kelly Barr.

## 2021-01-27 ASSESSMENT — ENCOUNTER SYMPTOMS
RESPIRATORY NEGATIVE: 1
ABDOMINAL PAIN: 0
GASTROINTESTINAL NEGATIVE: 1
EYES NEGATIVE: 1

## 2021-01-28 NOTE — PROGRESS NOTES
2021    TELEHEALTH EVALUATION -- Audio/Visual (During JBVEJ-19 public health emergency)    HPI:    Chloé Ohara (:  1965) has requested an audio/video evaluation for the following concern(s):    Patient is here to discuss abnormal bleeding. Has been having issues with perimenopausal/menopausal bleeding on and off HRT. For discussion of possible hysterectomy. Gynecologic Exam  This is a recurrent problem. The current episode started more than 1 month ago. The problem occurs intermittently. The problem has been unchanged. Pertinent negatives include no abdominal pain or urinary symptoms. Nothing aggravates the symptoms. She has tried nothing for the symptoms. The treatment provided no relief. Review of Systems   Constitutional: Negative. HENT: Negative. Eyes: Negative. Respiratory: Negative. Cardiovascular: Negative. Gastrointestinal: Negative. Negative for abdominal pain. Genitourinary: Positive for pelvic pain and vaginal bleeding. Musculoskeletal: Negative. Skin: Negative. Neurological: Negative. Psychiatric/Behavioral: Negative. Date of Birth 1965  Past Medical History:   Diagnosis Date    Abnormal perimenopausal bleeding 10/20/2020    10.2020 Pap, calcium, exercise, mammogram, hemocult negative 2 and 3. PMB-stripe 7 mm which is OK on HRT. Plans for endo bx. Attempted and was unsuccessful. Will stop HRT-check labs and repeat pelvic US.                    Acute thoracic back pain 2021    Allergic rhinitis     BMI 40.0-44.9, adult ([facility] [address] 5/15/2017    2017 a1c 5.3, knee  Pain, hyperlipidemia    Elevated blood pressure reading 9/3/2020    Hyperlipidemia     Medial meniscus tear     PONV (postoperative nausea and vomiting)     Scop patch worked well    Primary osteoarthritis involving multiple joints 2019     Past Surgical History:   Procedure Laterality Date    FOOT SURGERY  2015    rt tendon repair  KNEE ARTHROSCOPY Left 2017    OTHER SURGICAL HISTORY Right 2015    Secondary repair of peroneus brevis tendon tear R    SHOULDER SURGERY Left 2017    TUBAL LIGATION       OB History    Para Term  AB Living   3 3 3     3   SAB TAB Ectopic Molar Multiple Live Births             3      # Outcome Date GA Lbr Jimmie/2nd Weight Sex Delivery Anes PTL Lv   3 Term 46 37w0d   F Vag-Spont   WILL   2 Term 10 37w0d   F Vag-Spont   WILL   1 Term 78 37w0d   M Vag-Spont   WILL     Social History     Socioeconomic History    Marital status:      Spouse name: Not on file    Number of children: 3    Years of education: Not on file    Highest education level: Not on file   Occupational History    Occupation: RN   Social Needs    Financial resource strain: Not on file    Food insecurity     Worry: Not on file     Inability: Not on file   [employer] needs     Medical: Not on file     Non-medical: Not on file   Tobacco Use    Smoking status: Never Smoker    Smokeless tobacco: Never Used   Substance and Sexual Activity    Alcohol use: No     Alcohol/week: 0.0 standard drinks     Comment: rarely    Drug use: No    Sexual activity: Yes     Partners: Male   Lifestyle    Physical activity     Days per week: Not on file     Minutes per session: Not on file    Stress: Not on file   Relationships    Social connections     Talks on phone: Not on file     Gets together: Not on file     Attends [employer] service: Not on file     Active member of club or organization: Not on file     Attends meetings of clubs or organizations: Not on file     Relationship status: Not on file    Intimate partner violence     Fear of current or ex partner: Not on file     Emotionally abused: Not on file     Physically abused: Not on file     Forced sexual activity: Not on file   Other Topics Concern    Not on file   Social History Narrative    Not on file     No Known Allergies Outpatient Medications Marked as Taking for the 1/21/21 encounter (Virtual Visit) with Katrin Barillas MD   Medication Sig Dispense Refill    medroxyPROGESTERone (PROVERA) 10 MG tablet Take 2 tablets by mouth daily 60 tablet 3    lidocaine (LIDODERM) 5 % Place 1 patch onto the skin daily 12 hours on, 12 hours off. 30 patch 0    simvastatin (ZOCOR) 40 MG tablet TAKE 1 TABLET BY MOUTH ONE TIME A DAY FOR CHOLESTEROL 90 tablet 0    DULoxetine (CYMBALTA) 60 MG extended release capsule TAKE 1 CAPSULE BY MOUTH ONE TIME A DAY AS NEEDED FOR PAIN 90 capsule 1    meloxicam (MOBIC) 15 MG tablet TAKE 1 TABLET BY MOUTH ONE TIME A DAY WITH FOOD 90 tablet 2    fluticasone (FLONASE) 50 MCG/ACT nasal spray 1 spray by Nasal route daily 1 Bottle 3    tiZANidine (ZANAFLEX) 4 MG tablet 1 po q hs for muscle pain 90 tablet 1    acetaminophen (APAP EXTRA STRENGTH) 500 MG tablet 1 po bid prn 120 tablet 3    omeprazole (PRILOSEC OTC) 20 MG tablet Take 20 mg by mouth daily as needed.  cetirizine (ZYRTEC) 10 MG tablet Take 10 mg by mouth nightly        Family History   Problem Relation Age of Onset    High Blood Pressure Mother     Arthritis Mother     Heart Disease Father     High Blood Pressure Brother     Diabetes Brother     Heart Attack Brother     Heart Disease Maternal Grandmother     Stroke Paternal Grandmother     Heart Disease Paternal Grandfather     High Blood Pressure Brother     Depression Sister     Thyroid Disease Sister     High Cholesterol Sister     Depression Sister     High Cholesterol Sister     Rheum Arthritis Neg Hx     Osteoarthritis Neg Hx     Asthma Neg Hx     Breast Cancer Neg Hx     Cancer Neg Hx     Heart Failure Neg Hx     Hypertension Neg Hx     Migraines Neg Hx     Ovarian Cancer Neg Hx     Rashes/Skin Problems Neg Hx     Seizures Neg Hx      There were no vitals taken for this visit. Prior to Visit Medications    Medication Sig Taking?  Authorizing Provider medroxyPROGESTERone (PROVERA) 10 MG tablet Take 2 tablets by mouth daily Yes Helen Taylor MD   lidocaine (LIDODERM) 5 % Place 1 patch onto the skin daily 12 hours on, 12 hours off. Yes Daleen Sicard, MD   simvastatin (ZOCOR) 40 MG tablet TAKE 1 TABLET BY MOUTH ONE TIME A DAY FOR CHOLESTEROL Yes Daleen Sicard, MD   DULoxetine (CYMBALTA) 60 MG extended release capsule TAKE 1 CAPSULE BY MOUTH ONE TIME A DAY AS NEEDED FOR PAIN Yes Daleen Sicard, MD   meloxicam (MOBIC) 15 MG tablet TAKE 1 TABLET BY MOUTH ONE TIME A DAY WITH FOOD Yes Daleen Sicard, MD   fluticasone (FLONASE) 50 MCG/ACT nasal spray 1 spray by Nasal route daily Yes Daleen Sicard, MD   tiZANidine (ZANAFLEX) 4 MG tablet 1 po q hs for muscle pain Yes Daleen Sicard, MD   acetaminophen (APAP EXTRA STRENGTH) 500 MG tablet 1 po bid prn Yes Daleen Sicard, MD   omeprazole (PRILOSEC OTC) 20 MG tablet Take 20 mg by mouth daily as needed. Yes Historical Provider, MD   cetirizine (ZYRTEC) 10 MG tablet Take 10 mg by mouth nightly  Yes Historical Provider, MD   progesterone (PROMETRIUM) 100 MG capsule TAKE ONE TABLET BY MOUTH NIGHTLY  Helen Taylor MD   estradiol (ESTRACE) 2 MG tablet Take 1 tablet by mouth daily  Helen Taylor MD       Social History     Tobacco Use    Smoking status: Never Smoker    Smokeless tobacco: Never Used   Substance Use Topics    Alcohol use: No     Alcohol/week: 0.0 standard drinks     Comment: rarely    Drug use:  No            PHYSICAL EXAMINATION:  [ INSTRUCTIONS:  \"[x]\" Indicates a positive item  \"[]\" Indicates a negative item  -- DELETE ALL ITEMS NOT EXAMINED]  Vital Signs: (As obtained by patient/caregiver or practitioner observation)    Blood pressure-  Heart rate-    Respiratory rate-    Temperature-  Pulse oximetry-     Constitutional: [x] Appears well-developed and well-nourished [x] No apparent distress      [] Abnormal-   Mental status [x] Alert and awake  [x] Oriented to person/place/time [x]Able to follow commands      Eyes:  EOM    [x]  Normal  [] Abnormal-  Sclera  [x]  Normal  [] Abnormal -         Discharge [x]  None visible  [] Abnormal -    HENT:   [x] Normocephalic, atraumatic. [] Abnormal   [x] Mouth/Throat: Mucous membranes are moist.     External Ears [] Normal  [] Abnormal-     Neck: [x] No visualized mass     Pulmonary/Chest: [x] Respiratory effort normal.  [x] No visualized signs of difficulty breathing or respiratory distress        [] Abnormal-      Musculoskeletal:   [x] Normal gait with no signs of ataxia         [x] Normal range of motion of neck        [] Abnormal-       Neurological:        [x] No Facial Asymmetry (Cranial nerve 7 motor function) (limited exam to video visit)          [x] No gaze palsy        [] Abnormal-         Skin:        [] No significant exanthematous lesions or discoloration noted on facial skin         [] Abnormal-            Psychiatric:       [x] Normal Affect [x] No Hallucinations        [] Abnormal-     Other pertinent observable physical exam findings-     ASSESSMENT/PLAN:  1. Perimenopause/menopausal bleeding. Could not get endo bx in office due to cervical stenosis. She has small fibroids and 5 mm stripe. Given continued bleeding, need to consider surgery. Discussed d and c, but also needs to considered hysterectomy. Feel hysterectomy may be best option if wants to continue HRT. All the risks, benefits alternatives discussed with patient including bleeding, blood transfusion, infection, damage to the bowel, bladder, other local organs with need for secondary surgery, anesthesia risks, blood clots in the lungs, legs, pelvis after surgery. Patient understands these risks and agrees to the procedure. Patient also understands there may be other unforseen risks. Discussed Robotic assisted hysterectomy, possible bilateral salpingo-oophorectomy, possible laparotomy. Will discuss for 3/2021. Sonia Rangel is a 60 y.o. female being evaluated by a Virtual Visit (video visit) encounter to address concerns as mentioned above. A caregiver was present when appropriate. Due to this being a TeleHealth encounter (During WTSYT-97 public health emergency), evaluation of the following organ systems was limited: Vitals/Constitutional/EENT/Resp/CV/GI//MS/Neuro/Skin/Heme-Lymph-Imm. Pursuant to the emergency declaration under the [address] [place] [address] and the [employer] Act, this Virtual Visit was conducted with patient's (and/or legal guardian's) consent, to reduce the patient's risk of exposure to COVID-19 and provide necessary medical care. The patient (and/or legal guardian) has also been advised to contact this office for worsening conditions or problems, and seek emergency medical treatment and/or call 911 if deemed necessary. Patient identification was verified at the start of the visit: Yes    Total time spent on this encounter: Not billed by time    Services were provided through a video synchronous discussion virtually to substitute for in-person clinic visit. Patient and provider were located at their individual homes. --Dylan Baker MD on 1/27/2021 at 10:35 PM    An electronic signature was used to authenticate this note.

## 2021-02-03 ENCOUNTER — TELEPHONE (OUTPATIENT)
Dept: GYNECOLOGY | Age: 56
End: 2021-02-03

## 2021-02-03 NOTE — TELEPHONE ENCOUNTER
Called patient at [phone] left a message for a return call. Also called [phone] left a VM for a return call. Called patient to give her information about her surgery. Patient surgery is scheduled for 3/3/2021 at 9:30am arrival time is 7:30am.     Surgery is scheduled at [facility] [place].     Covid test is scheduled for 2/26/201 at 9:30am at [facility].     2 week post op is scheduled for 3/18/2021 at 10:30 am at [facility]. Book with information will be mailed to patients home.

## 2021-02-17 NOTE — PROGRESS NOTES
Preoperative Consultation      Elida Cox  YOB: 1965    Date of Service:  2/18/2021    Vitals:    02/18/21 1510 02/18/21 1603   BP: (!) 144/89 130/86   Pulse: 76    Resp: 16    Temp: 97 °F (36.1 °C)    SpO2: 99%    Weight: 241 lb (109.3 kg)    Height: 5' 4\" (1.626 m)       Wt Readings from Last 2 Encounters:   02/18/21 241 lb (109.3 kg)   10/15/20 236 lb 6.4 oz (107.2 kg)     BP Readings from Last 3 Encounters:   02/18/21 130/86   10/15/20 133/86   09/03/20 127/86        Chief Complaint   Patient presents with    Follow-up     No Known Allergies  Outpatient Medications Marked as Taking for the 2/18/21 encounter (Office Visit) with Georgia Cardona MD   Medication Sig Dispense Refill    simvastatin (ZOCOR) 40 MG tablet TAKE 1 TABLET BY MOUTH ONE TIME A DAY FOR CHOLESTEROL 90 tablet 0    DULoxetine (CYMBALTA) 60 MG extended release capsule TAKE 1 CAPSULE BY MOUTH ONE TIME A DAY AS NEEDED FOR PAIN 90 capsule 1    fluticasone (FLONASE) 50 MCG/ACT nasal spray 1 spray by Nasal route daily 1 Bottle 3    omeprazole (PRILOSEC OTC) 20 MG tablet Take 20 mg by mouth daily as needed.       cetirizine (ZYRTEC) 10 MG tablet Take 10 mg by mouth nightly          This patient presents to the office today for a preoperative consultation at the request of surgeon, Dr. Alek Fuentes, who plans on performing total hyst bilat BSO on March 3 at [facility].        Planned anesthesia: General   Known anesthesia problems: post op vomiting controlled with scopalamine patch   Bleeding risk: No recent or remote history of abnormal bleeding  Personal or FH of DVT/PE: No    Patient objection to receiving blood products: No    Patient Active Problem List   Diagnosis    Allergic rhinitis    HLD (hyperlipidemia)    FH: MI in first degree male relative    FH: Chelsey Gehrig's disease    S/P arthroscopy of left shoulder, debridement, [place], SAD and Biceps tenotomy 5/05/2017  BMI 40.0-44.9, adult ([facility] [address]    S/P left knee arthroscopy, chondroplasty and synovectomy 5/31/2017    Low back pain    Primary osteoarthritis involving multiple joints    Elevated blood pressure reading    Abnormal perimenopausal bleeding    Acute thoracic back pain       Past Medical History:   Diagnosis Date    Abnormal perimenopausal bleeding 10/20/2020    10.2020 Pap, calcium, exercise, mammogram, hemocult negative 2 and 3. PMB-stripe 7 mm which is OK on HRT. Plans for endo bx. Attempted and was unsuccessful. Will stop HRT-check labs and repeat pelvic US.                    Acute thoracic back pain 1/6/2021    Allergic rhinitis     BMI 40.0-44.9, adult ([facility] [address] 5/15/2017    2017 a1c 5.3, knee  Pain, hyperlipidemia    Elevated blood pressure reading 9/3/2020    Hyperlipidemia     Medial meniscus tear     PONV (postoperative nausea and vomiting)     Scop patch worked well    Primary osteoarthritis involving multiple joints 6/26/2019     Past Surgical History:   Procedure Laterality Date    FOOT SURGERY  4/2015    rt tendon repair    KNEE ARTHROSCOPY Left 05/31/2017    OTHER SURGICAL HISTORY Right 4/22/2015    Secondary repair of peroneus brevis tendon tear R    SHOULDER SURGERY Left 05/05/2017    TUBAL LIGATION  1993     Family History   Problem Relation Age of Onset    High Blood Pressure Mother     Arthritis Mother     Heart Disease Father     High Blood Pressure Brother     Diabetes Brother     Heart Attack Brother     Heart Disease Maternal Grandmother     Stroke Paternal Grandmother     Heart Disease Paternal Grandfather     High Blood Pressure Brother     Depression Sister     Thyroid Disease Sister     High Cholesterol Sister     Depression Sister     High Cholesterol Sister     Rheum Arthritis Neg Hx     Osteoarthritis Neg Hx     Asthma Neg Hx     Breast Cancer Neg Hx     Cancer Neg Hx     Heart Failure Neg Hx     Hypertension Neg Hx     Migraines Neg Hx Review of systems: Up-to-date with eye exams.  Wears glasses dental exams up-to-date. No concussions or seizures. Denies any wheezing pneumonias or abnormal chest x-rays other than one episode of bronchiolitis when she worked at [employer]. No chest pain or palpitations when not drinking coffee. . No syncope. No breast lumps or abnormalities.  rare GE reflux. No bloody stools kidney stones stress in his intolerance of urine.  Remotely abnormal Pap smear GYN.  Recently holding menses hormone replacement.  Follow-up December 2022. .  Positive arthralgias. Sees orthopedist. Has had knee injections. .Skin cancers. Denies any depression or anxiety. No known sleep apnea.  Back pain      Physical Exam   HEENT. TMs are unremarkable. Throat clear. None crowded hypopharynx. Good dentition. Caps laterally. Good range of motion of the neck. Lungs are clear. Good CANDY ratio without any wheezes rales or rhonchi. Cardiovascular exam regular rate and rhythm no murmur click. Abdomen obese. No hepatosplenomegaly or epigastric tenderness. No rebound. Crepitus of the knees. Good pulses feet. No peripheral edema. Multiple nevi no suspicious    Constitutional: EKG Interpretation:  normal EKG, normal sinus rhythm, unchanged from previous tracings. Lab Review   Lab Results   Component Value Date     06/26/2019    K 4.4 06/26/2019     06/26/2019    CO2 25 06/26/2019    BUN 12 06/26/2019    CREATININE 0.7 06/26/2019    GLUCOSE 98 08/06/2020    CALCIUM 9.7 06/26/2019      Lab Results   Component Value Date    ALT 10 06/26/2019    AST 11 (L) 06/26/2019    ALKPHOS 49 06/26/2019    BILITOT 0.5 06/26/2019     Lab Results   Component Value Date    WBC 8.6 04/14/2015    HGB 13.2 04/14/2015    HCT 38.7 04/14/2015    MCV 87.8 04/14/2015     04/14/2015            Assessment:       60 y.o. patient with planned surgery as above. Known risk factors for perioperative complications: Anesthesia concerns- elevatd BP, post op nausea, elevated BMI, ,   Current medications which may produce withdrawal symptoms if withheld perioperatively: none      Plan:     1. Preoperative workup as follows: ECG, hemoglobin, hematocrit, electrolytes, creatinine, glucose, urinalysis (urinary tract instrumentation planned)  2. Change in medication regimen before surgery: Hold all medications on morning of surgery  3. Prophylaxis for cardiac events with perioperative beta-blockers: Not indicated  ACC/AHA indications for pre-operative beta-blocker use:    · Vascular surgery with history of postitive stress test  · Intermediate or high risk surgery with history of CAD   · Intermediate or high risk surgery with multiple clinical predictors of CAD- 2 of the following: history of compensated or prior heart failure, history of cerebrovascular disease, DM, or renal insufficiency    Routine administration of higher-dose, long-acting metoprolol in beta-blockernaïve patients on the day of surgery, and in the absence of dose titration is associated with an overall increase in mortality. Beta-blockers should be started days to weeks prior to surgery and titrated to pulse < 70.  4. Deep vein thrombosis prophylaxis: regimen to be chosen by surgical team  5. No contraindications to planned surgery unless abnormal laboratories    No diagnosis of sleep apnea however observe in recovery room. Positive caps laterally on teeth. Low suspicion for trauma with intubation. Post operative nausea. Scopolamine.

## 2021-02-18 ENCOUNTER — OFFICE VISIT (OUTPATIENT)
Dept: FAMILY MEDICINE CLINIC | Age: 56
End: 2021-02-18
Payer: COMMERCIAL

## 2021-02-18 VITALS
SYSTOLIC BLOOD PRESSURE: 130 MMHG | TEMPERATURE: 97 F | HEIGHT: 64 IN | DIASTOLIC BLOOD PRESSURE: 86 MMHG | BODY MASS INDEX: 41.15 KG/M2 | WEIGHT: 241 LBS | RESPIRATION RATE: 16 BRPM | HEART RATE: 76 BPM | OXYGEN SATURATION: 99 %

## 2021-02-18 DIAGNOSIS — J30.9 ALLERGIC RHINITIS, UNSPECIFIED SEASONALITY, UNSPECIFIED TRIGGER: ICD-10-CM

## 2021-02-18 DIAGNOSIS — R03.0 ELEVATED BLOOD PRESSURE READING: ICD-10-CM

## 2021-02-18 DIAGNOSIS — Z01.818 PREOP EXAMINATION: Primary | ICD-10-CM

## 2021-02-18 DIAGNOSIS — E78.00 PURE HYPERCHOLESTEROLEMIA: ICD-10-CM

## 2021-02-18 DIAGNOSIS — M15.9 PRIMARY OSTEOARTHRITIS INVOLVING MULTIPLE JOINTS: ICD-10-CM

## 2021-02-18 DIAGNOSIS — N93.9 VAGINAL BLEEDING: ICD-10-CM

## 2021-02-18 PROCEDURE — 36415 COLL VENOUS BLD VENIPUNCTURE: CPT | Performed by: INTERNAL MEDICINE

## 2021-02-18 PROCEDURE — 99242 OFF/OP CONSLTJ NEW/EST SF 20: CPT | Performed by: INTERNAL MEDICINE

## 2021-02-18 PROCEDURE — 90471 IMMUNIZATION ADMIN: CPT | Performed by: INTERNAL MEDICINE

## 2021-02-18 PROCEDURE — 93000 ELECTROCARDIOGRAM COMPLETE: CPT | Performed by: INTERNAL MEDICINE

## 2021-02-18 PROCEDURE — 90715 TDAP VACCINE 7 YRS/> IM: CPT | Performed by: INTERNAL MEDICINE

## 2021-02-18 RX ORDER — SIMVASTATIN 40 MG
TABLET ORAL
Qty: 90 TABLET | Refills: 0 | Status: SHIPPED | OUTPATIENT
Start: 2021-02-18 | End: 2021-05-24

## 2021-02-19 LAB
ANION GAP SERPL CALCULATED.3IONS-SCNC: 9 MMOL/L (ref 3–16)
BUN BLDV-MCNC: 17 MG/DL (ref 7–20)
CALCIUM SERPL-MCNC: 9.4 MG/DL (ref 8.3–10.6)
CHLORIDE BLD-SCNC: 101 MMOL/L (ref 99–110)
CO2: 27 MMOL/L (ref 21–32)
CREAT SERPL-MCNC: 0.7 MG/DL (ref 0.6–1.1)
GFR AFRICAN AMERICAN: >60
GFR NON-AFRICAN AMERICAN: >60
GLUCOSE BLD-MCNC: 83 MG/DL (ref 70–99)
HCT VFR BLD CALC: 39.2 % (ref 36–48)
HEMOGLOBIN: 13 G/DL (ref 12–16)
MCH RBC QN AUTO: 29.5 PG (ref 26–34)
MCHC RBC AUTO-ENTMCNC: 33.2 G/DL (ref 31–36)
MCV RBC AUTO: 88.9 FL (ref 80–100)
PDW BLD-RTO: 12.9 % (ref 12.4–15.4)
PLATELET # BLD: 273 K/UL (ref 135–450)
PMV BLD AUTO: 10.2 FL (ref 5–10.5)
POTASSIUM SERPL-SCNC: 4.3 MMOL/L (ref 3.5–5.1)
RBC # BLD: 4.41 M/UL (ref 4–5.2)
SODIUM BLD-SCNC: 137 MMOL/L (ref 136–145)
WBC # BLD: 8.3 K/UL (ref 4–11)

## 2021-02-20 LAB — URINE CULTURE, ROUTINE: NORMAL

## 2021-02-25 RX ORDER — MEDROXYPROGESTERONE ACETATE 10 MG/1
20 TABLET ORAL DAILY
Status: ON HOLD | COMMUNITY
End: 2021-03-03 | Stop reason: HOSPADM

## 2021-02-25 RX ORDER — MAGNESIUM OXIDE 400 MG/1
400 TABLET ORAL DAILY
COMMUNITY

## 2021-02-25 NOTE — PROGRESS NOTES
[address] [facility] time___0800_________        Surgery time_____0930_______    Take the following medications with a sip of water: Follow your MD/Surgeons pre-procedure instructions regarding your medications    Do not eat or drink anything after 12:00 midnight prior to your surgery. This includes water chewing gum, mints and ice chips. You may brush your teeth and gargle the morning of your surgery, but do not swallow the water     Please see your family doctor/pediatrician for a history and physical and/or concerning medications. Bring any test results/reports from your physicians office. If you are under the care of a heart doctor or specialist doctor, please be aware that you may be asked to them for clearance    You may be asked to stop blood thinners such as Coumadin, Plavix, Fragmin, Lovenox, etc., or any anti-inflammatories such as:  Aspirin, Ibuprofen, Advil, Naproxen prior to your surgery. We also ask that you stop any OTC medications such as fish oil, vitamin E, glucosamine, garlic, Multivitamins, COQ 10, etc.    We ask that you do not smoke 24 hours prior to surgery  We ask that you do not  drink any alcoholic beverages 24 hours prior to surgery     You must make arrangements for a responsible adult to take you home after your surgery. For your safety you will not be allowed to leave alone or drive yourself home. Your surgery will be cancelled if you do not have a ride home. Also for your safety, it is strongly suggested that someone stay with you the first 24 hours after your surgery. A parent or legal guardian must accompany a child scheduled for surgery and plan to stay at the hospital until the child is discharged. Please do not bring other children with you. For your comfort, please wear simple loose fitting clothing to the hospital.  Please do not bring valuables.     Do not wear any make-up or nail polish on your fingers or toes      For your safety, please do not wear any jewelry or body piercing's on the day of surgery. All jewelry must be removed. If you have dentures, they will be removed before going to operating room. For your convenience, we will provide you with a container. If you wear contact lenses or glasses, they will be removed, please bring a case for them. If you have a living will and a durable power of  for healthcare, please bring in a copy. As part of our patient safety program to minimize surgical site infections, we ask you to do the following:    · Please notify your surgeon if you develop any illness between         now and the  day of your surgery. · This includes a cough, cold, fever, sore throat, nausea,         or vomiting, and diarrhea, etc.  ·  Please notify your surgeon if you experience dizziness, shortness         of breath or blurred vision between now and the time of your surgery. Do not shave your operative site 96 hours prior to surgery. For face and neck surgery, men may use an electric razor 48 hours   prior to surgery. You may shower the night before surgery or the morning of   your surgery with an antibacterial soap. You will need to bring a photo ID and insurance card    [facility] has an onsite pharmacy, would you like to utilize our pharmacy     If you will be staying overnight and use a C-pap machine, please bring   your C-pap to hospital     Our goal is to provide you with excellent care, therefore, visitors will be limited to two(2) in the room at a time so that we may focus on providing this care for you. Please contact pre-admission testing if you have any further questions. [facility] phone number:  [phone] [facility] fax number:  [phone]  Please note these are generalized instructions for all surgical cases, you may be provided with more specific instructions according to your surgery.

## 2021-02-25 NOTE — PROGRESS NOTES
C-Difficile admission screening and protocol:     * Admitted with diarrhea? YES____    NO__X___     *Prior history of C-Diff. In last 3 months? YES____   NO__X___     *Antibiotic use in the past 6-8 weeks? NO__X____YES______                 If yes which  ANTIBIOTIC AND REASON______     *Prior hospitalization or nursing home in the last month?  YES____   NO_X__

## 2021-03-01 ENCOUNTER — PREP FOR PROCEDURE (OUTPATIENT)
Dept: GYNECOLOGY | Age: 56
End: 2021-03-01

## 2021-03-01 RX ORDER — SODIUM CHLORIDE, SODIUM LACTATE, POTASSIUM CHLORIDE, CALCIUM CHLORIDE 600; 310; 30; 20 MG/100ML; MG/100ML; MG/100ML; MG/100ML
INJECTION, SOLUTION INTRAVENOUS CONTINUOUS
Status: CANCELLED | OUTPATIENT
Start: 2021-03-01

## 2021-03-01 RX ORDER — SODIUM CHLORIDE 0.9 % (FLUSH) 0.9 %
10 SYRINGE (ML) INJECTION PRN
Status: CANCELLED | OUTPATIENT
Start: 2021-03-01

## 2021-03-01 RX ORDER — SODIUM CHLORIDE 0.9 % (FLUSH) 0.9 %
10 SYRINGE (ML) INJECTION EVERY 12 HOURS SCHEDULED
Status: CANCELLED | OUTPATIENT
Start: 2021-03-01

## 2021-03-02 ENCOUNTER — ANESTHESIA EVENT (OUTPATIENT)
Dept: OPERATING ROOM | Age: 56
End: 2021-03-02
Payer: COMMERCIAL

## 2021-03-03 ENCOUNTER — ANESTHESIA (OUTPATIENT)
Dept: OPERATING ROOM | Age: 56
End: 2021-03-03
Payer: COMMERCIAL

## 2021-03-03 ENCOUNTER — HOSPITAL ENCOUNTER (OUTPATIENT)
Age: 56
Setting detail: OUTPATIENT SURGERY
Discharge: HOME | End: 2021-03-03
Attending: OBSTETRICS & GYNECOLOGY | Admitting: OBSTETRICS & GYNECOLOGY
Payer: COMMERCIAL

## 2021-03-03 VITALS
TEMPERATURE: 97.9 F | HEART RATE: 73 BPM | SYSTOLIC BLOOD PRESSURE: 123 MMHG | BODY MASS INDEX: 40.65 KG/M2 | OXYGEN SATURATION: 98 % | RESPIRATION RATE: 16 BRPM | DIASTOLIC BLOOD PRESSURE: 74 MMHG | WEIGHT: 238.1 LBS | HEIGHT: 64 IN

## 2021-03-03 VITALS
TEMPERATURE: 98.1 F | DIASTOLIC BLOOD PRESSURE: 71 MMHG | SYSTOLIC BLOOD PRESSURE: 148 MMHG | OXYGEN SATURATION: 98 % | RESPIRATION RATE: 7 BRPM

## 2021-03-03 DIAGNOSIS — N95.0 PMB (POSTMENOPAUSAL BLEEDING): ICD-10-CM

## 2021-03-03 DIAGNOSIS — Z90.710 S/P HYSTERECTOMY: Primary | ICD-10-CM

## 2021-03-03 LAB
A/G RATIO: 1.5 (ref 1.1–2.2)
ABO/RH: NORMAL
ALBUMIN SERPL-MCNC: 4.3 G/DL (ref 3.4–5)
ALP BLD-CCNC: 59 U/L (ref 40–129)
ALT SERPL-CCNC: 12 U/L (ref 10–40)
ANION GAP SERPL CALCULATED.3IONS-SCNC: 13 MMOL/L (ref 3–16)
ANTIBODY SCREEN: NORMAL
AST SERPL-CCNC: 14 U/L (ref 15–37)
BILIRUB SERPL-MCNC: 0.5 MG/DL (ref 0–1)
BUN BLDV-MCNC: 10 MG/DL (ref 7–20)
CALCIUM SERPL-MCNC: 9.7 MG/DL (ref 8.3–10.6)
CHLORIDE BLD-SCNC: 103 MMOL/L (ref 99–110)
CO2: 25 MMOL/L (ref 21–32)
CREAT SERPL-MCNC: 0.9 MG/DL (ref 0.6–1.1)
GFR AFRICAN AMERICAN: >60
GFR NON-AFRICAN AMERICAN: >60
GLOBULIN: 2.9 G/DL
GLUCOSE BLD-MCNC: 90 MG/DL (ref 70–99)
HCG QUALITATIVE: NEGATIVE
HCT VFR BLD CALC: 39.3 % (ref 36–48)
HEMOGLOBIN: 13.4 G/DL (ref 12–16)
MAGNESIUM: 2.1 MG/DL (ref 1.8–2.4)
MCH RBC QN AUTO: 30 PG (ref 26–34)
MCHC RBC AUTO-ENTMCNC: 34.1 G/DL (ref 31–36)
MCV RBC AUTO: 88.1 FL (ref 80–100)
PDW BLD-RTO: 12.7 % (ref 12.4–15.4)
PLATELET # BLD: 247 K/UL (ref 135–450)
PMV BLD AUTO: 9.4 FL (ref 5–10.5)
POTASSIUM REFLEX MAGNESIUM: 3.5 MMOL/L (ref 3.5–5.1)
PREGNANCY, URINE: NEGATIVE
RBC # BLD: 4.46 M/UL (ref 4–5.2)
SARS-COV-2, NAAT: NOT DETECTED
SODIUM BLD-SCNC: 141 MMOL/L (ref 136–145)
TOTAL PROTEIN: 7.2 G/DL (ref 6.4–8.2)
WBC # BLD: 7.2 K/UL (ref 4–11)

## 2021-03-03 PROCEDURE — 85027 COMPLETE CBC AUTOMATED: CPT

## 2021-03-03 PROCEDURE — 83735 ASSAY OF MAGNESIUM: CPT

## 2021-03-03 PROCEDURE — 7100000001 HC PACU RECOVERY - ADDTL 15 MIN: Performed by: OBSTETRICS & GYNECOLOGY

## 2021-03-03 PROCEDURE — 2500000003 HC RX 250 WO HCPCS

## 2021-03-03 PROCEDURE — 7100000011 HC PHASE II RECOVERY - ADDTL 15 MIN: Performed by: OBSTETRICS & GYNECOLOGY

## 2021-03-03 PROCEDURE — 2500000003 HC RX 250 WO HCPCS: Performed by: OBSTETRICS & GYNECOLOGY

## 2021-03-03 PROCEDURE — 2709999900 HC NON-CHARGEABLE SUPPLY: Performed by: OBSTETRICS & GYNECOLOGY

## 2021-03-03 PROCEDURE — 80053 COMPREHEN METABOLIC PANEL: CPT

## 2021-03-03 PROCEDURE — 6360000002 HC RX W HCPCS: Performed by: ANESTHESIOLOGY

## 2021-03-03 PROCEDURE — 58571 TLH W/T/O 250 G OR LESS: CPT | Performed by: OBSTETRICS & GYNECOLOGY

## 2021-03-03 PROCEDURE — 6360000002 HC RX W HCPCS: Performed by: NURSE ANESTHETIST, CERTIFIED REGISTERED

## 2021-03-03 PROCEDURE — 2500000003 HC RX 250 WO HCPCS: Performed by: NURSE ANESTHETIST, CERTIFIED REGISTERED

## 2021-03-03 PROCEDURE — 2580000003 HC RX 258: Performed by: OBSTETRICS & GYNECOLOGY

## 2021-03-03 PROCEDURE — 3600000019 HC SURGERY ROBOT ADDTL 15MIN: Performed by: OBSTETRICS & GYNECOLOGY

## 2021-03-03 PROCEDURE — 88331 PATH CONSLTJ SURG 1 BLK 1SPC: CPT

## 2021-03-03 PROCEDURE — 86901 BLOOD TYPING SEROLOGIC RH(D): CPT

## 2021-03-03 PROCEDURE — 3700000001 HC ADD 15 MINUTES (ANESTHESIA): Performed by: OBSTETRICS & GYNECOLOGY

## 2021-03-03 PROCEDURE — 6370000000 HC RX 637 (ALT 250 FOR IP): Performed by: ANESTHESIOLOGY

## 2021-03-03 PROCEDURE — 84703 CHORIONIC GONADOTROPIN ASSAY: CPT

## 2021-03-03 PROCEDURE — 3600000009 HC SURGERY ROBOT BASE: Performed by: OBSTETRICS & GYNECOLOGY

## 2021-03-03 PROCEDURE — 2580000003 HC RX 258: Performed by: ANESTHESIOLOGY

## 2021-03-03 PROCEDURE — 7100000000 HC PACU RECOVERY - FIRST 15 MIN: Performed by: OBSTETRICS & GYNECOLOGY

## 2021-03-03 PROCEDURE — S2900 ROBOTIC SURGICAL SYSTEM: HCPCS | Performed by: OBSTETRICS & GYNECOLOGY

## 2021-03-03 PROCEDURE — 7100000010 HC PHASE II RECOVERY - FIRST 15 MIN: Performed by: OBSTETRICS & GYNECOLOGY

## 2021-03-03 PROCEDURE — 3700000000 HC ANESTHESIA ATTENDED CARE: Performed by: OBSTETRICS & GYNECOLOGY

## 2021-03-03 PROCEDURE — 6360000002 HC RX W HCPCS: Performed by: OBSTETRICS & GYNECOLOGY

## 2021-03-03 PROCEDURE — 88342 IMHCHEM/IMCYTCHM 1ST ANTB: CPT

## 2021-03-03 PROCEDURE — 87635 SARS-COV-2 COVID-19 AMP PRB: CPT

## 2021-03-03 PROCEDURE — 2720000010 HC SURG SUPPLY STERILE: Performed by: OBSTETRICS & GYNECOLOGY

## 2021-03-03 PROCEDURE — 88307 TISSUE EXAM BY PATHOLOGIST: CPT

## 2021-03-03 PROCEDURE — 86900 BLOOD TYPING SEROLOGIC ABO: CPT

## 2021-03-03 PROCEDURE — 6360000002 HC RX W HCPCS

## 2021-03-03 PROCEDURE — 86850 RBC ANTIBODY SCREEN: CPT

## 2021-03-03 RX ORDER — PROMETHAZINE HYDROCHLORIDE 25 MG/1
25 TABLET ORAL EVERY 6 HOURS PRN
Qty: 30 TABLET | Refills: 1 | Status: SHIPPED | OUTPATIENT
Start: 2021-03-03 | End: 2022-03-03

## 2021-03-03 RX ORDER — SODIUM CHLORIDE 0.9 % (FLUSH) 0.9 %
10 SYRINGE (ML) INJECTION EVERY 12 HOURS SCHEDULED
Status: DISCONTINUED | OUTPATIENT
Start: 2021-03-03 | End: 2021-03-03 | Stop reason: HOSPADM

## 2021-03-03 RX ORDER — SENNA AND DOCUSATE SODIUM 50; 8.6 MG/1; MG/1
2 TABLET, FILM COATED ORAL DAILY PRN
Qty: 40 TABLET | Refills: 2 | Status: SHIPPED | OUTPATIENT
Start: 2021-03-03 | End: 2022-03-03

## 2021-03-03 RX ORDER — DEXAMETHASONE SODIUM PHOSPHATE 4 MG/ML
INJECTION, SOLUTION INTRA-ARTICULAR; INTRALESIONAL; INTRAMUSCULAR; INTRAVENOUS; SOFT TISSUE PRN
Status: DISCONTINUED | OUTPATIENT
Start: 2021-03-03 | End: 2021-03-03 | Stop reason: SDUPTHER

## 2021-03-03 RX ORDER — HYDROCODONE BITARTRATE AND ACETAMINOPHEN 5; 325 MG/1; MG/1
2 TABLET ORAL PRN
Status: DISCONTINUED | OUTPATIENT
Start: 2021-03-03 | End: 2021-03-03 | Stop reason: HOSPADM

## 2021-03-03 RX ORDER — FENTANYL CITRATE 50 UG/ML
INJECTION, SOLUTION INTRAMUSCULAR; INTRAVENOUS PRN
Status: DISCONTINUED | OUTPATIENT
Start: 2021-03-03 | End: 2021-03-03 | Stop reason: SDUPTHER

## 2021-03-03 RX ORDER — APREPITANT 40 MG/1
40 CAPSULE ORAL ONCE
Status: COMPLETED | OUTPATIENT
Start: 2021-03-03 | End: 2021-03-03

## 2021-03-03 RX ORDER — OXYCODONE HYDROCHLORIDE AND ACETAMINOPHEN 5; 325 MG/1; MG/1
1-2 TABLET ORAL EVERY 4 HOURS PRN
Qty: 50 TABLET | Refills: 0 | Status: SHIPPED | OUTPATIENT
Start: 2021-03-03 | End: 2021-03-10

## 2021-03-03 RX ORDER — SODIUM CHLORIDE 0.9 % (FLUSH) 0.9 %
10 SYRINGE (ML) INJECTION EVERY 12 HOURS SCHEDULED
Status: DISCONTINUED | OUTPATIENT
Start: 2021-03-03 | End: 2021-03-03 | Stop reason: SDUPTHER

## 2021-03-03 RX ORDER — SODIUM CHLORIDE 0.9 % (FLUSH) 0.9 %
10 SYRINGE (ML) INJECTION PRN
Status: DISCONTINUED | OUTPATIENT
Start: 2021-03-03 | End: 2021-03-03 | Stop reason: SDUPTHER

## 2021-03-03 RX ORDER — GLYCOPYRROLATE 0.2 MG/ML
INJECTION INTRAMUSCULAR; INTRAVENOUS PRN
Status: DISCONTINUED | OUTPATIENT
Start: 2021-03-03 | End: 2021-03-03 | Stop reason: SDUPTHER

## 2021-03-03 RX ORDER — ONDANSETRON 2 MG/ML
INJECTION INTRAMUSCULAR; INTRAVENOUS PRN
Status: DISCONTINUED | OUTPATIENT
Start: 2021-03-03 | End: 2021-03-03 | Stop reason: SDUPTHER

## 2021-03-03 RX ORDER — MIDAZOLAM HYDROCHLORIDE 1 MG/ML
INJECTION INTRAMUSCULAR; INTRAVENOUS PRN
Status: DISCONTINUED | OUTPATIENT
Start: 2021-03-03 | End: 2021-03-03 | Stop reason: SDUPTHER

## 2021-03-03 RX ORDER — HYDROCODONE BITARTRATE AND ACETAMINOPHEN 5; 325 MG/1; MG/1
1 TABLET ORAL PRN
Status: DISCONTINUED | OUTPATIENT
Start: 2021-03-03 | End: 2021-03-03 | Stop reason: HOSPADM

## 2021-03-03 RX ORDER — SODIUM CHLORIDE 0.9 % (FLUSH) 0.9 %
10 SYRINGE (ML) INJECTION PRN
Status: DISCONTINUED | OUTPATIENT
Start: 2021-03-03 | End: 2021-03-03 | Stop reason: HOSPADM

## 2021-03-03 RX ORDER — MEPERIDINE HYDROCHLORIDE 25 MG/ML
12.5 INJECTION INTRAMUSCULAR; INTRAVENOUS; SUBCUTANEOUS
Status: DISCONTINUED | OUTPATIENT
Start: 2021-03-03 | End: 2021-03-03 | Stop reason: HOSPADM

## 2021-03-03 RX ORDER — SODIUM CHLORIDE, SODIUM LACTATE, POTASSIUM CHLORIDE, CALCIUM CHLORIDE 600; 310; 30; 20 MG/100ML; MG/100ML; MG/100ML; MG/100ML
INJECTION, SOLUTION INTRAVENOUS CONTINUOUS
Status: DISCONTINUED | OUTPATIENT
Start: 2021-03-03 | End: 2021-03-03 | Stop reason: HOSPADM

## 2021-03-03 RX ORDER — HYDRALAZINE HYDROCHLORIDE 20 MG/ML
5 INJECTION INTRAMUSCULAR; INTRAVENOUS EVERY 10 MIN PRN
Status: DISCONTINUED | OUTPATIENT
Start: 2021-03-03 | End: 2021-03-03 | Stop reason: HOSPADM

## 2021-03-03 RX ORDER — PROPOFOL 10 MG/ML
INJECTION, EMULSION INTRAVENOUS PRN
Status: DISCONTINUED | OUTPATIENT
Start: 2021-03-03 | End: 2021-03-03 | Stop reason: SDUPTHER

## 2021-03-03 RX ORDER — SODIUM CHLORIDE 9 MG/ML
INJECTION, SOLUTION INTRAVENOUS CONTINUOUS
Status: DISCONTINUED | OUTPATIENT
Start: 2021-03-03 | End: 2021-03-03 | Stop reason: HOSPADM

## 2021-03-03 RX ORDER — SCOLOPAMINE TRANSDERMAL SYSTEM 1 MG/1
1 PATCH, EXTENDED RELEASE TRANSDERMAL
Status: DISCONTINUED | OUTPATIENT
Start: 2021-03-03 | End: 2021-03-03 | Stop reason: HOSPADM

## 2021-03-03 RX ORDER — PROMETHAZINE HYDROCHLORIDE 25 MG/ML
6.25 INJECTION, SOLUTION INTRAMUSCULAR; INTRAVENOUS
Status: DISCONTINUED | OUTPATIENT
Start: 2021-03-03 | End: 2021-03-03 | Stop reason: HOSPADM

## 2021-03-03 RX ORDER — ACETAMINOPHEN 325 MG/1
650 TABLET ORAL ONCE
Status: COMPLETED | OUTPATIENT
Start: 2021-03-03 | End: 2021-03-03

## 2021-03-03 RX ORDER — BUPIVACAINE HYDROCHLORIDE 2.5 MG/ML
INJECTION, SOLUTION EPIDURAL; INFILTRATION; INTRACAUDAL
Status: COMPLETED | OUTPATIENT
Start: 2021-03-03 | End: 2021-03-03

## 2021-03-03 RX ORDER — ROCURONIUM BROMIDE 10 MG/ML
INJECTION, SOLUTION INTRAVENOUS PRN
Status: DISCONTINUED | OUTPATIENT
Start: 2021-03-03 | End: 2021-03-03 | Stop reason: SDUPTHER

## 2021-03-03 RX ORDER — FENTANYL CITRATE 50 UG/ML
50 INJECTION, SOLUTION INTRAMUSCULAR; INTRAVENOUS EVERY 5 MIN PRN
Status: DISCONTINUED | OUTPATIENT
Start: 2021-03-03 | End: 2021-03-03 | Stop reason: HOSPADM

## 2021-03-03 RX ORDER — LIDOCAINE HYDROCHLORIDE 20 MG/ML
INJECTION, SOLUTION EPIDURAL; INFILTRATION; INTRACAUDAL; PERINEURAL PRN
Status: DISCONTINUED | OUTPATIENT
Start: 2021-03-03 | End: 2021-03-03 | Stop reason: SDUPTHER

## 2021-03-03 RX ORDER — FENTANYL CITRATE 50 UG/ML
25 INJECTION, SOLUTION INTRAMUSCULAR; INTRAVENOUS EVERY 5 MIN PRN
Status: DISCONTINUED | OUTPATIENT
Start: 2021-03-03 | End: 2021-03-03 | Stop reason: HOSPADM

## 2021-03-03 RX ORDER — DIPHENHYDRAMINE HYDROCHLORIDE 50 MG/ML
INJECTION INTRAMUSCULAR; INTRAVENOUS PRN
Status: DISCONTINUED | OUTPATIENT
Start: 2021-03-03 | End: 2021-03-03 | Stop reason: SDUPTHER

## 2021-03-03 RX ORDER — MAGNESIUM HYDROXIDE 1200 MG/15ML
LIQUID ORAL CONTINUOUS PRN
Status: COMPLETED | OUTPATIENT
Start: 2021-03-03 | End: 2021-03-03

## 2021-03-03 RX ORDER — ONDANSETRON 2 MG/ML
4 INJECTION INTRAMUSCULAR; INTRAVENOUS
Status: COMPLETED | OUTPATIENT
Start: 2021-03-03 | End: 2021-03-03

## 2021-03-03 RX ADMIN — SODIUM CHLORIDE: 9 INJECTION, SOLUTION INTRAVENOUS at 08:13

## 2021-03-03 RX ADMIN — SODIUM CHLORIDE: 9 INJECTION, SOLUTION INTRAVENOUS at 11:36

## 2021-03-03 RX ADMIN — LIDOCAINE HYDROCHLORIDE 100 MG: 20 INJECTION, SOLUTION EPIDURAL; INFILTRATION; INTRACAUDAL; PERINEURAL at 09:40

## 2021-03-03 RX ADMIN — APREPITANT 40 MG: 40 CAPSULE ORAL at 08:23

## 2021-03-03 RX ADMIN — ENOXAPARIN SODIUM 40 MG: 40 INJECTION SUBCUTANEOUS at 08:35

## 2021-03-03 RX ADMIN — ROCURONIUM BROMIDE 5 MG: 10 INJECTION INTRAVENOUS at 11:09

## 2021-03-03 RX ADMIN — SODIUM CHLORIDE: 9 INJECTION, SOLUTION INTRAVENOUS at 13:14

## 2021-03-03 RX ADMIN — METRONIDAZOLE 500 MG: 500 INJECTION, SOLUTION INTRAVENOUS at 09:02

## 2021-03-03 RX ADMIN — MIDAZOLAM 2 MG: 1 INJECTION INTRAMUSCULAR; INTRAVENOUS at 09:36

## 2021-03-03 RX ADMIN — DIPHENHYDRAMINE HYDROCHLORIDE 12.5 MG: 50 INJECTION, SOLUTION INTRAMUSCULAR; INTRAVENOUS at 09:57

## 2021-03-03 RX ADMIN — ACETAMINOPHEN 650 MG: 325 TABLET ORAL at 08:38

## 2021-03-03 RX ADMIN — HYDROMORPHONE HYDROCHLORIDE 0.5 MG: 1 INJECTION, SOLUTION INTRAMUSCULAR; INTRAVENOUS; SUBCUTANEOUS at 11:09

## 2021-03-03 RX ADMIN — ONDANSETRON 4 MG: 2 INJECTION INTRAMUSCULAR; INTRAVENOUS at 11:23

## 2021-03-03 RX ADMIN — SUGAMMADEX 200 MG: 100 INJECTION, SOLUTION INTRAVENOUS at 11:29

## 2021-03-03 RX ADMIN — HYDROMORPHONE HYDROCHLORIDE 0.5 MG: 1 INJECTION, SOLUTION INTRAMUSCULAR; INTRAVENOUS; SUBCUTANEOUS at 10:59

## 2021-03-03 RX ADMIN — FENTANYL CITRATE 100 MCG: 50 INJECTION INTRAMUSCULAR; INTRAVENOUS at 09:38

## 2021-03-03 RX ADMIN — PROPOFOL 200 MG: 10 INJECTION, EMULSION INTRAVENOUS at 09:40

## 2021-03-03 RX ADMIN — CEFAZOLIN SODIUM 2 G: 10 INJECTION, POWDER, FOR SOLUTION INTRAVENOUS at 09:39

## 2021-03-03 RX ADMIN — DEXAMETHASONE SODIUM PHOSPHATE 8 MG: 4 INJECTION, SOLUTION INTRAMUSCULAR; INTRAVENOUS at 09:56

## 2021-03-03 RX ADMIN — ONDANSETRON 4 MG: 2 INJECTION INTRAMUSCULAR; INTRAVENOUS at 13:16

## 2021-03-03 RX ADMIN — ROCURONIUM BROMIDE 50 MG: 10 INJECTION INTRAVENOUS at 09:40

## 2021-03-03 RX ADMIN — GLYCOPYRROLATE 0.2 MG: 0.2 INJECTION, SOLUTION INTRAMUSCULAR; INTRAVENOUS at 10:10

## 2021-03-03 RX ADMIN — SODIUM CHLORIDE: 9 INJECTION, SOLUTION INTRAVENOUS at 09:21

## 2021-03-03 ASSESSMENT — PULMONARY FUNCTION TESTS
PIF_VALUE: 34
PIF_VALUE: 34
PIF_VALUE: 27
PIF_VALUE: 20
PIF_VALUE: 32
PIF_VALUE: 34
PIF_VALUE: 32
PIF_VALUE: 20
PIF_VALUE: 36
PIF_VALUE: 32
PIF_VALUE: 21
PIF_VALUE: 35
PIF_VALUE: 6
PIF_VALUE: 35
PIF_VALUE: 34
PIF_VALUE: 21
PIF_VALUE: 1
PIF_VALUE: 36
PIF_VALUE: 23
PIF_VALUE: 21
PIF_VALUE: 32
PIF_VALUE: 14
PIF_VALUE: 25
PIF_VALUE: 34
PIF_VALUE: 37
PIF_VALUE: 34
PIF_VALUE: 24
PIF_VALUE: 27
PIF_VALUE: 21
PIF_VALUE: 20
PIF_VALUE: 26
PIF_VALUE: 34
PIF_VALUE: 20
PIF_VALUE: 34
PIF_VALUE: 20
PIF_VALUE: 35
PIF_VALUE: 32
PIF_VALUE: 1
PIF_VALUE: 20
PIF_VALUE: 21
PIF_VALUE: 34
PIF_VALUE: 1
PIF_VALUE: 34
PIF_VALUE: 20
PIF_VALUE: 34
PIF_VALUE: 6
PIF_VALUE: 34
PIF_VALUE: 32
PIF_VALUE: 21
PIF_VALUE: 32
PIF_VALUE: 34
PIF_VALUE: 24
PIF_VALUE: 32
PIF_VALUE: 20
PIF_VALUE: 20
PIF_VALUE: 21
PIF_VALUE: 6
PIF_VALUE: 5
PIF_VALUE: 21
PIF_VALUE: 34
PIF_VALUE: 33
PIF_VALUE: 32
PIF_VALUE: 23
PIF_VALUE: 33
PIF_VALUE: 34
PIF_VALUE: 34
PIF_VALUE: 20
PIF_VALUE: 18
PIF_VALUE: 32

## 2021-03-03 ASSESSMENT — LIFESTYLE VARIABLES: SMOKING_STATUS: 0

## 2021-03-03 ASSESSMENT — PAIN DESCRIPTION - DESCRIPTORS
DESCRIPTORS: BURNING

## 2021-03-03 ASSESSMENT — PAIN - FUNCTIONAL ASSESSMENT
PAIN_FUNCTIONAL_ASSESSMENT: 0-10
PAIN_FUNCTIONAL_ASSESSMENT: ACTIVITIES ARE NOT PREVENTED

## 2021-03-03 ASSESSMENT — PAIN DESCRIPTION - LOCATION
LOCATION: ABDOMEN
LOCATION: ABDOMEN

## 2021-03-03 ASSESSMENT — PAIN DESCRIPTION - PROGRESSION: CLINICAL_PROGRESSION: NOT CHANGED

## 2021-03-03 ASSESSMENT — PAIN DESCRIPTION - FREQUENCY
FREQUENCY: CONTINUOUS

## 2021-03-03 ASSESSMENT — PAIN SCALES - GENERAL
PAINLEVEL_OUTOF10: 0
PAINLEVEL_OUTOF10: 1

## 2021-03-03 ASSESSMENT — PAIN DESCRIPTION - ONSET: ONSET: GRADUAL

## 2021-03-03 ASSESSMENT — PAIN DESCRIPTION - PAIN TYPE: TYPE: ACUTE PAIN

## 2021-03-03 ASSESSMENT — PAIN DESCRIPTION - ORIENTATION: ORIENTATION: MID

## 2021-03-03 NOTE — PROGRESS NOTES
Assisted to and from BR to void. Up in chair.  at bedside. Denies need for medication for nausea presently. C/o being \"cold\". Warm blankets given.

## 2021-03-03 NOTE — PROGRESS NOTES
Resting quietly. States nausea has eased and \"just a little cramping\". Awaiting [facility]. States she is ready to go home.

## 2021-03-03 NOTE — ANESTHESIA PRE PROCEDURE
[facility] Department of Anesthesiology  Pre-Anesthesia Evaluation/Consultation       Name:  Saritha Isaac  : 1965  Age:  60 y.o. MRN:  [id number]  Date: 3/3/2021           Surgeon: Surgeon(s):  Adam Perez MD    Procedure: Procedure(s):  ROBOTIC ASSISTED HYSTERECTOMY, POSSIBLE BILATERAL SALPINGO-OOPHORECTOMY, POSSIBLE LAPAROTOMY     No Known Allergies  Patient Active Problem List   Diagnosis    Allergic rhinitis    HLD (hyperlipidemia)    FH: MI in first degree male relative    FH: Chelsey Gehrig's disease    S/P arthroscopy of left shoulder, debridement, [place], SAD and Biceps tenotomy 2017    BMI 40.0-44.9, adult ([facility] [address]    S/P left knee arthroscopy, chondroplasty and synovectomy 2017    Low back pain    Primary osteoarthritis involving multiple joints    Elevated blood pressure reading    Abnormal perimenopausal bleeding    Acute thoracic back pain     Past Medical History:   Diagnosis Date    Abnormal perimenopausal bleeding 10/20/2020    10.2020 Pap, calcium, exercise, mammogram, hemocult negative 2 and 3. PMB-stripe 7 mm which is OK on HRT. Plans for endo bx. Attempted and was unsuccessful. Will stop HRT-check labs and repeat pelvic US.                    Acute thoracic back pain 2021    Allergic rhinitis     BMI 40.0-44.9, adult ([address] 5/15/2017    2017 a1c 5.3, knee  Pain, hyperlipidemia    Elevated blood pressure reading 9/3/2020    Hyperlipidemia     Medial meniscus tear     PONV (postoperative nausea and vomiting)     Scop patch worked well    Primary osteoarthritis involving multiple joints 2019    Wears glasses      Past Surgical History:   Procedure Laterality Date    KNEE ARTHROSCOPY Left 2017    OTHER SURGICAL HISTORY Right 2015    Secondary repair of peroneus brevis tendon tear R foot    SHOULDER SURGERY Left 2017    labrum repair   [address]     Social History Tobacco Use    Smoking status: Never Smoker    Smokeless tobacco: Never Used   Substance Use Topics    Alcohol use: No     Alcohol/week: 0.0 standard drinks     Comment: rarely    Drug use: Never     Medications  No current facility-administered medications on file prior to encounter. Current Outpatient Medications on File Prior to Encounter   Medication Sig Dispense Refill    medroxyPROGESTERone (PROVERA) 10 MG tablet Take 20 mg by mouth daily      EVENING PRIMROSE OIL PO Take 1,000 mg by mouth 2 times daily      magnesium oxide (MAG-OX) 400 MG tablet Take 400 mg by mouth daily      DULoxetine (CYMBALTA) 60 MG extended release capsule TAKE 1 CAPSULE BY MOUTH ONE TIME A DAY AS NEEDED FOR PAIN 90 capsule 1    fluticasone (FLONASE) 50 MCG/ACT nasal spray 1 spray by Nasal route daily 1 Bottle 3    tiZANidine (ZANAFLEX) 4 MG tablet 1 po q hs for muscle pain (Patient taking differently: Take 4 mg by mouth 1 po q hs for muscle pain) 90 tablet 1    acetaminophen (APAP EXTRA STRENGTH) 500 MG tablet 1 po bid prn 120 tablet 3    omeprazole (PRILOSEC OTC) 20 MG tablet Take 20 mg by mouth daily as needed.       cetirizine (ZYRTEC) 10 MG tablet Take 10 mg by mouth nightly        Current Facility-Administered Medications   Medication Dose Route Frequency Provider Last Rate Last Admin    0.9 % sodium chloride infusion   Intravenous Continuous Randy Vernon MD        sodium chloride flush 0.9 % injection 10 mL  10 mL Intravenous 2 times per day Randy Vernon MD        sodium chloride flush 0.9 % injection 10 mL  10 mL Intravenous PRN Randy Vernon MD        ceFAZolin (ANCEF) 2000 mg in dextrose 5 % 100 mL IVPB  2,000 mg Intravenous On Call to [address] [place] [address], MD        enoxaparin (LOVENOX) injection 40 mg  40 mg Subcutaneous Once Andrew Eden MD        lactated ringers infusion   Intravenous Continuous Andrew Eden MD  metronidazole (FLAGYL) 500 mg in NaCl 100 mL IVPB premix  500 mg Intravenous On Call to [address] Judith Hartman MD        scopolamine (TRANSDERM-SCOP) transdermal patch 1 patch  1 patch Transdermal Q72H Jackeline Verduzco MD        aprepitant (EMEND) capsule 40 mg  40 mg Oral Once Jackeline Verduzco MD         Vital Signs (Current)   Vitals:    02/25/21 1511   Weight: 241 lb (109.3 kg)   Height: 5' 4\" (1.626 m)                                          BP Readings from Last 3 Encounters:   02/18/21 130/86   10/15/20 133/86   09/03/20 127/86     Vital Signs Statistics (for past 48 hrs)     No data recorded  BP Readings from Last 3 Encounters:   02/18/21 130/86   10/15/20 133/86   09/03/20 127/86       BMI  Body mass index is 41.37 kg/m². Estimated body mass index is 41.37 kg/m² as calculated from the following:    Height as of this encounter: 5' 4\" (1.626 m). Weight as of this encounter: 241 lb (109.3 kg).     CBC   Lab Results   Component Value Date    WBC 8.3 02/18/2021    RBC 4.41 02/18/2021    HGB 13.0 02/18/2021    HCT 39.2 02/18/2021    MCV 88.9 02/18/2021    RDW 12.9 02/18/2021     02/18/2021     CMP    Lab Results   Component Value Date     02/18/2021    K 4.3 02/18/2021     02/18/2021    CO2 27 02/18/2021    BUN 17 02/18/2021    CREATININE 0.7 02/18/2021    GFRAA >60 02/18/2021    GFRAA >60 04/06/2013    AGRATIO 1.6 06/05/2017    LABGLOM >60 02/18/2021    GLUCOSE 83 02/18/2021    PROT 6.4 06/26/2019    PROT 6.2 11/14/2012    CALCIUM 9.4 02/18/2021    BILITOT 0.5 06/26/2019    ALKPHOS 49 06/26/2019    AST 11 06/26/2019    ALT 10 06/26/2019     BMP    Lab Results   Component Value Date     02/18/2021    K 4.3 02/18/2021     02/18/2021    CO2 27 02/18/2021    BUN 17 02/18/2021    CREATININE 0.7 02/18/2021    CALCIUM 9.4 02/18/2021    GFRAA >60 02/18/2021    GFRAA >60 04/06/2013    LABGLOM >60 02/18/2021    GLUCOSE 83 02/18/2021     POCGlucose No results for input(s): GLUCOSE in the last 72 hours. Coags  No results found for: PROTIME, INR, APTT  HCG (If Applicable)   Lab Results   Component Value Date    PREGTESTUR Negative 05/31/2017      ABGs No results found for: PHART, PO2ART, VDT2AFX, BTO1LFQ, BEART, N9SIKAGP   Type & Screen (If Applicable)  No results found for: LABABO, LABRH                         BMI: Wt Readings from Last 3 Encounters:       NPO Status:  >8h                          Anesthesia Evaluation  Patient summary reviewed   history of anesthetic complications: PONV. Airway: Mallampati: II  TM distance: >3 FB   Neck ROM: full  Mouth opening: > = 3 FB Dental: normal exam         Pulmonary: breath sounds clear to auscultation      (-) COPD, asthma, sleep apnea and not a current smoker                           Cardiovascular:  Exercise tolerance: good (>4 METS),   (+) hyperlipidemia    (-) hypertension, past MI, CABG/stent and  angina        Rate: normal                    Neuro/Psych:      (-) seizures, TIA and CVA           GI/Hepatic/Renal:   (+) morbid obesity     (-) GERD       Endo/Other:        (-) diabetes mellitus, hypothyroidism               Abdominal:           Vascular:     - DVT and PE. Anesthesia Plan      general     ASA 2       Induction: intravenous. MIPS: Postoperative opioids intended and Prophylactic antiemetics administered. Anesthetic plan and risks discussed with patient. Plan discussed with CRNA. This pre-anesthesia assessment may be used as a history and physical.    DOS STAFF ADDENDUM:    Pt seen and examined, chart reviewed (including anesthesia, drug and allergy history). No interval changes to history and physical examination. Anesthetic plan, risks, benefits, alternatives, and personnel involved discussed with patient. Patient verbalized an understanding and agrees to proceed.       Jackeline Verduzco MD  March 3, 2021  7:59 AM

## 2021-03-03 NOTE — BRIEF OP NOTE
Brief Postoperative Note      Patient: Ronak Plunkett  YOB: 1965  MRN: [id number]    Date of Procedure: 3/3/2021    Pre-Op Diagnosis: POSMENOPAUSAL BLEEDING, PELVIC CRAMPING, abnormal uterine bleeding on HRT    Post-Op Diagnosis: Same       Procedure(s):  ROBOTIC ASSISTED HYSTERECTOMY, BILATERAL SALPINGO-OOPHORECTOMY    Surgeon(s):  Jannet Duval MD    Assistant:  Surgical Assistant: Inessa Pham    Anesthesia: General    Estimated Blood Loss (mL): less than 961 ml     Complications: None    Specimens:   ID Type Source Tests Collected by Time Destination   A : a) uterus, cervix, bilateral fallopian tubes, bilateral ovaries Tissue Tissue SURGICAL PATHOLOGY Jannet Duval MD 3/3/2021 1026        Implants:  * No implants in log *      Drains:   Urethral Catheter Non-latex 16 fr (Active)       Findings: uterus, cervix, tubes to pathology-ovaries per pathology grossly normal but atrophic    Electronically signed by Jannet Duval MD on 3/3/2021 at 11:44 AM

## 2021-03-03 NOTE — PROGRESS NOTES
Dr. Steven Sylvester called pathology and explained which part is for frozen. Uterus, cervix bilateral fallopian tubes, bilateral ovaries sent fresh and taken down by mikey PCA.

## 2021-03-03 NOTE — PROGRESS NOTES
Vaginal Sweep Documentation     Intraop skin prep sponge count correct, verified by krystian travis and cuba travis. Vaginal sweep performed by Dr. Ab Martinez at 6489. No foreign objects or vaginal tears noted.

## 2021-03-03 NOTE — ANESTHESIA POSTPROCEDURE EVALUATION
Department of Anesthesiology  Postprocedure Note    Patient: Neo Wells  MRN: [id number]  YOB: 1965  Date of evaluation: 3/3/2021  Time:  3:23 PM     Procedure Summary     Date: 03/03/21 Room / Location:  [facility] / [address] [facility]    Anesthesia Start: 8212 Anesthesia Stop: 9984    Procedure: ROBOTIC ASSISTED HYSTERECTOMY, BILATERAL SALPINGO-OOPHORECTOMY (Bilateral Abdomen) Diagnosis:       PMB (postmenopausal bleeding)      (POSMENOPAUSAL BLEEDING, PELVIC CRAMPING)    Surgeons: Michael Dunham MD Responsible Provider: Lucien Antonio MD    Anesthesia Type: general ASA Status: 2          Anesthesia Type: general    Jie Phase I: Jie Score: 8    Jie Phase II: Jie Score: 10    Last vitals: Reviewed and per EMR flowsheets.        Anesthesia Post Evaluation    Patient location during evaluation: PACU  Patient participation: complete - patient participated  Level of consciousness: awake and alert  Pain score: 3  Airway patency: patent  Nausea & Vomiting: no nausea and no vomiting  Complications: no  Cardiovascular status: blood pressure returned to baseline  Respiratory status: acceptable  Hydration status: euvolemic

## 2021-03-03 NOTE — H&P
History     Socioeconomic History    Marital status:      Spouse name: Not on file    Number of children: 3    Years of education: Not on file    Highest education level: Not on file   Occupational History    Occupation: RN   Social Needs    Financial resource strain: Not on file    Food insecurity     Worry: Not on file     Inability: Not on file   [employer] needs     Medical: Not on file     Non-medical: Not on file   Tobacco Use    Smoking status: Never Smoker    Smokeless tobacco: Never Used   Substance and Sexual Activity    Alcohol use: No     Alcohol/week: 0.0 standard drinks     Comment: rarely    Drug use: Never    Sexual activity: Yes     Partners: Male   Lifestyle    Physical activity     Days per week: Not on file     Minutes per session: Not on file    Stress: Not on file   Relationships    Social connections     Talks on phone: Not on file     Gets together: Not on file     Attends [employer] service: Not on file     Active member of club or organization: Not on file     Attends meetings of clubs or organizations: Not on file     Relationship status: Not on file    Intimate partner violence     Fear of current or ex partner: Not on file     Emotionally abused: Not on file     Physically abused: Not on file     Forced sexual activity: Not on file   Other Topics Concern    Not on file   Social History Narrative    Not on file     No Known Allergies  Outpatient Medications Marked as Taking for the 3/3/21 encounter [facility] Encounter)   Medication Sig Dispense Refill    medroxyPROGESTERone (PROVERA) 10 MG tablet Take 20 mg by mouth daily      EVENING PRIMROSE OIL PO Take 1,000 mg by mouth 2 times daily      magnesium oxide (MAG-OX) 400 MG tablet Take 400 mg by mouth daily      simvastatin (ZOCOR) 40 MG tablet TAKE 1 TABLET BY MOUTH ONE TIME A DAY FOR CHOLESTEROL 90 tablet 0    [DISCONTINUED] medroxyPROGESTERone (PROVERA) 10 MG tablet Take 2 tablets by mouth daily 60 tablet 3    DULoxetine (CYMBALTA) 60 MG extended release capsule TAKE 1 CAPSULE BY MOUTH ONE TIME A DAY AS NEEDED FOR PAIN 90 capsule 1    fluticasone (FLONASE) 50 MCG/ACT nasal spray 1 spray by Nasal route daily 1 Bottle 3    tiZANidine (ZANAFLEX) 4 MG tablet 1 po q hs for muscle pain (Patient taking differently: Take 4 mg by mouth 1 po q hs for muscle pain) 90 tablet 1    acetaminophen (APAP EXTRA STRENGTH) 500 MG tablet 1 po bid prn 120 tablet 3    cetirizine (ZYRTEC) 10 MG tablet Take 10 mg by mouth nightly        Family History   Problem Relation Age of Onset    High Blood Pressure Mother     Arthritis Mother     Heart Disease Father     High Blood Pressure Brother     Diabetes Brother     Heart Attack Brother     Heart Disease Maternal Grandmother     Stroke Paternal Grandmother     Heart Disease Paternal Grandfather     High Blood Pressure Brother     Depression Sister     Thyroid Disease Sister     High Cholesterol Sister     Depression Sister     High Cholesterol Sister     Rheum Arthritis Neg Hx     Osteoarthritis Neg Hx     Asthma Neg Hx     Breast Cancer Neg Hx     Cancer Neg Hx     Heart Failure Neg Hx     Hypertension Neg Hx     Migraines Neg Hx     Ovarian Cancer Neg Hx     Rashes/Skin Problems Neg Hx     Seizures Neg Hx      BP (!) 147/66   Pulse 72   Temp 97.8 °F (36.6 °C) (Temporal)   Resp 16   Ht 5' 4\" (1.626 m)   Wt 238 lb 1.6 oz (108 kg)   SpO2 98%   BMI 40.87 kg/m²       WDWN in NAD  A and O x 3  HEENT-EOMI, mmm  CAR-RRR  Lungs-CTA  ABD-soft, NT, ND, no hsm  EXT-no c/c/e, no cords, NT  Neuro-grossly intact  PV-nl efg, Normal urethral meatus, nl urethra, nl bladder. , nl cervix, nl vagina, nl uterus with no masses, NT. Nl adnexa with no masses, NT.    Plan-with AUB, fibroids, bleeding on HRT. For Robotic assisted hysterectomy,  bilateral salpingo-oophorectomy, possible laparotomy.  All the risks, benefits alternatives discussed with patient including bleeding, blood transfusion, infection, damage to the bowel, bladder, other local organs with need for secondary surgery, anesthesia risks, blood clots in the lungs, legs, pelvis after surgery. Patient understands these risks and agrees to the procedure. Patient also understands there may be other unforseen risks.

## 2021-03-04 NOTE — PROGRESS NOTES
CLINICAL PHARMACY NOTE: MEDS TO [facility] Patient?: No  Total # of Prescriptions Filled: 4   The following medications were delivered to the patient:  Discharge Medication List as of 3/3/2021 12:37 PM      START taking these medications    Details   oxyCODONE-acetaminophen (PERCOCET) 5-325 MG per tablet Take 1-2 tablets by mouth every 4 hours as needed for Pain for up to 7 days. , Disp-50 tablet, R-0Print      Simethicone 80 MG TABS Take 80 mg by mouth every 6 hours as needed (gas pain), Disp-40 each, R-1Print      sennosides-docusate sodium (SENOKOT-S) 8.6-50 MG tablet Take 2 tablets by mouth daily as needed for Constipation, Disp-40 tablet, R-2Print      promethazine (PHENERGAN) 25 MG tablet Take 1 tablet by mouth every 6 hours as needed for Nausea, Disp-30 tablet, R-1Print         ·   Total # of Interventions Completed: 0  Time Spent (min): 30    Additional Documentation:

## 2021-03-04 NOTE — PROGRESS NOTES
CLINICAL PHARMACY NOTE: MEDS TO [facility] Patient?: No  Total # of Prescriptions Filled: 4   The following medications were delivered to the patient:  Discharge Medication List as of 3/3/2021 12:37 PM      START taking these medications    Details   oxyCODONE-acetaminophen (PERCOCET) 5-325 MG per tablet Take 1-2 tablets by mouth every 4 hours as needed for Pain for up to 7 days. , Disp-50 tablet, R-0Print      Simethicone 80 MG TABS Take 80 mg by mouth every 6 hours as needed (gas pain), Disp-40 each, R-1Print      sennosides-docusate sodium (SENOKOT-S) 8.6-50 MG tablet Take 2 tablets by mouth daily as needed for Constipation, Disp-40 tablet, R-2Print      promethazine (PHENERGAN) 25 MG tablet Take 1 tablet by mouth every 6 hours as needed for Nausea, Disp-30 tablet, R-1Print         ·   ·   Total # of Interventions Completed: 0  Time Spent (min): 30    Additional Documentation:

## 2021-03-05 PROBLEM — Z90.79 S/P TAH-BSO: Status: ACTIVE | Noted: 2020-10-20

## 2021-03-05 PROBLEM — Z90.710 S/P TAH-BSO: Status: ACTIVE | Noted: 2020-10-20

## 2021-03-05 PROBLEM — Z90.722 S/P TAH-BSO: Status: ACTIVE | Noted: 2020-10-20

## 2021-03-07 NOTE — OP NOTE
0 [address] [place] [address]                                OPERATIVE REPORT    PATIENT NAME: Mick Burkitt                  :        1965  MED REC NO:   [id number]                          ROOM:  ACCOUNT NO:   [de-identified]                           ADMIT DATE: 2021  PROVIDER:     Feliciano Parks MD    DATE OF PROCEDURE:  2021    PREOPERATIVE DIAGNOSES:  Postmenopausal bleeding and abnormal bleeding  on hormone replacement therapy and pelvic cramping. POSTOPERATIVE DIAGNOSES:  Postmenopausal bleeding and abnormal bleeding  on hormone replacement therapy and pelvic cramping. OPERATION PERFORMED:  Robotic-assisted hysterectomy, bilateral  salpingo-oophorectomy. SURGEON:  Feliciano Parks MD    ANESTHESIA:  General.    IV FLUIDS:  1000 mL. EBL:  Less than 100 mL. COMPLICATIONS:  None. FINDINGS:  Uterus, cervix, tubes to Pathology. Ovaries, per Pathology,  grossly normal but atrophic. CONDITION:  The patient was taken to the recovery room in a stable  condition. INDICATIONS AND CONSENT:  The patient is a 47-year-old female who  presents with having some abnormal uterine bleeding on hormone  replacement therapy. The patient had on ultrasound an endometrial  stripe of 7 mm, unable to get a biopsy in the office. Repeat ultrasound  showing 5-mm stripe but the patient had fibroids and had continued  abnormal bleeding, so the decision was made to proceed with a  robotic-assisted hysterectomy, bilateral salpingo-oophorectomy, possible  laparotomy. All the risks, benefits, and alternatives were discussed  with the patient including the risks of bleeding; blood transfusion;  infection; damage to the bowel, bladder or other local organs; need for  secondary surgery; blood clots to lungs, legs, and pelvis after surgery;  anesthesia risk.   The patient understands these risks and agrees to the procedure. OPERATIVE PROCEDURE:  The patient was taken to the operating room where  general anesthesia was found to be adequate. She was prepped and draped  in the normal sterile fashion in the dorsal lithotomy position. Gunter  was to straight drain. Pneumo-boots were in place. The cervix was  grasped with a single-tooth tenaculum. ForniSee was placed in the  uterine cavity for uterine manipulation. Attention was taken to the abdomen where an 8-mm supraumbilical incision  was made with a scalpel after an injection of 0.25% Marcaine. A Veress  needle was placed in the abdominopelvic cavity. Low pressures were  noted upon insufflation. Pneumoperitoneum was created. A right and  left midabdominal incision was made with a scalpel after an injection  with 0.25% Marcaine, and 8-mm trocars were placed. Right upper quadrant  incision was made with a scalpel after an injection with 0.25% Marcaine,  and an 8-mm trocar was placed. At this point, I docked the robot and  went to the robotic console. The uterus was slightly enlarged. The ovaries appeared slightly yellow  and small. The tubes appeared normal.  The decision was made to remove  everything. The left mesosalpinx was sealed and divided with [place] bipolar and  monopolar sabrina. The left round ligament was sealed with [place]  bipolar and monopolar sabrina. The vesicouterine peritoneum was  dissected in the midline. The right mesosalpinx was sealed with  [place] bipolar and monopolar sabrina. The round ligament was sealed  with [place] bipolar and monopolar sabrina. The vesicouterine  peritoneum was dissected in the midline. The bladder was dissected away  from the cervicovaginal junction and the lower uterine segment. The uterine vessels on either side were sealed and divided with [place]  bipolar and monopolar sabrina. The cardinal ligaments were sealed with  [place] bipolar and monopolar sabrina.   The anterior vagina was entered with the monopolar sabrina and then the cervix was dissected from the  vagina in a circumferential fashion with the monopolar sabrina. The  uterus, cervix, tubes, and ovaries were delivered through the vagina. Irrigation was performed. Vaginal cuff was closed with several  interrupted stitches of 0-Vicryl in a figure-of-eight fashion. Irrigation was performed again. Hemostasis was achieved. Vistaseal was  placed along all pedicle sites. The decision was made to end the  procedure. Trocars were removed under direct visualization. Pneumoperitoneum was released. The incisions were closed with 4-0  Monocryl in a subcuticular fashion. Steri-Strips and Mastisol were  placed. The patient tolerated the procedure well. All sponge, lap, and  needle counts were correct x2.         Jordy Palomo MD    D: 03/06/2021 16:00:37       T: 03/06/2021 22:37:53     [email or web address]  Job#: [id number]     Doc#: [id number]    CC:

## 2021-03-12 ENCOUNTER — TELEPHONE (OUTPATIENT)
Dept: GYNECOLOGY | Age: 56
End: 2021-03-12

## 2021-03-12 NOTE — TELEPHONE ENCOUNTER
Patient called the office stating that she had some light vaginal bleeding on this morning when she wiped with toilet paper. Patient also said she think she may over did it on yesterday she straighten her house but didn't lift anything over 5 lbs. She says she is feeling much better and even her back is better since she had the surgery. Patient wanted the office to know.      Patient can be contacted at [phone]

## 2021-03-12 NOTE — TELEPHONE ENCOUNTER
Tell her that this is normal to have some light spotting. Make sure that she takes it easy this morning.

## 2021-03-18 ENCOUNTER — OFFICE VISIT (OUTPATIENT)
Dept: GYNECOLOGY | Age: 56
End: 2021-03-18

## 2021-03-18 VITALS
BODY MASS INDEX: 22.43 KG/M2 | SYSTOLIC BLOOD PRESSURE: 119 MMHG | HEART RATE: 75 BPM | RESPIRATION RATE: 17 BRPM | WEIGHT: 139.6 LBS | HEIGHT: 66 IN | DIASTOLIC BLOOD PRESSURE: 87 MMHG | TEMPERATURE: 98.2 F

## 2021-03-18 DIAGNOSIS — Z98.890 POST-OPERATIVE STATE: Primary | ICD-10-CM

## 2021-03-18 PROCEDURE — 99024 POSTOP FOLLOW-UP VISIT: CPT | Performed by: OBSTETRICS & GYNECOLOGY

## 2021-03-18 RX ORDER — ESTRADIOL 2 MG/1
2 TABLET ORAL DAILY
Qty: 90 TABLET | Refills: 3 | Status: SHIPPED | OUTPATIENT
Start: 2021-03-18 | End: 2022-05-09

## 2021-03-19 NOTE — PROGRESS NOTES
Patient is here for post op visit. Patient doing well.      /87 (Site: Right Upper Arm, Position: Sitting, Cuff Size: Large Adult)   Pulse 75   Temp 98.2 °F (36.8 °C)   Resp 17   Ht 5' 6\" (1.676 m)   Wt 139 lb 9.6 oz (63.3 kg)   BMI 22.53 kg/m²     WDWN in NAD  A and O x 3  ABD-soft, NT, ND, incision cdi  Ext-no c/c/e, no cords, NT    Plan-s/p robotic assisted hysterectomy  -healing well  -restart Estrace 2 mg daily

## 2021-04-20 ENCOUNTER — OFFICE VISIT (OUTPATIENT)
Dept: GYNECOLOGY | Age: 56
End: 2021-04-20

## 2021-04-20 VITALS
RESPIRATION RATE: 17 BRPM | HEART RATE: 74 BPM | SYSTOLIC BLOOD PRESSURE: 149 MMHG | WEIGHT: 243.8 LBS | HEIGHT: 64 IN | BODY MASS INDEX: 41.62 KG/M2 | DIASTOLIC BLOOD PRESSURE: 97 MMHG | TEMPERATURE: 97 F

## 2021-04-20 DIAGNOSIS — G89.18 POST-OP PAIN: Primary | ICD-10-CM

## 2021-04-20 PROCEDURE — 99024 POSTOP FOLLOW-UP VISIT: CPT | Performed by: OBSTETRICS & GYNECOLOGY

## 2021-04-21 ENCOUNTER — TELEPHONE (OUTPATIENT)
Dept: GYNECOLOGY | Age: 56
End: 2021-04-21

## 2021-04-21 NOTE — TELEPHONE ENCOUNTER
Patient called with fax number regarding her extension from being off from work. States it was discussed during her follow up appt yesterday.  They are requesting office notes from last visit with updates       Fax number is [phone]

## 2021-05-13 RX ORDER — DULOXETIN HYDROCHLORIDE 60 MG/1
CAPSULE, DELAYED RELEASE ORAL
Qty: 90 CAPSULE | Refills: 1 | Status: SHIPPED | OUTPATIENT
Start: 2021-05-13 | End: 2021-12-29

## 2021-05-13 RX ORDER — TIZANIDINE 4 MG/1
TABLET ORAL
Qty: 90 TABLET | Refills: 1 | Status: SHIPPED | OUTPATIENT
Start: 2021-05-13

## 2021-05-23 DIAGNOSIS — E78.00 PURE HYPERCHOLESTEROLEMIA: ICD-10-CM

## 2021-05-24 RX ORDER — SIMVASTATIN 40 MG
TABLET ORAL
Qty: 90 TABLET | Refills: 0 | Status: SHIPPED | OUTPATIENT
Start: 2021-05-24 | End: 2021-08-26

## 2021-06-29 ENCOUNTER — VIRTUAL VISIT (OUTPATIENT)
Dept: FAMILY MEDICINE CLINIC | Age: 56
End: 2021-06-29
Payer: COMMERCIAL

## 2021-06-29 DIAGNOSIS — Z90.722 S/P TAH-BSO: ICD-10-CM

## 2021-06-29 DIAGNOSIS — Z90.710 S/P TAH-BSO: ICD-10-CM

## 2021-06-29 DIAGNOSIS — Z90.79 S/P TAH-BSO: ICD-10-CM

## 2021-06-29 DIAGNOSIS — R42 VERTIGO: Primary | ICD-10-CM

## 2021-06-29 PROBLEM — M54.6 ACUTE THORACIC BACK PAIN: Status: RESOLVED | Noted: 2021-01-06 | Resolved: 2021-06-29

## 2021-06-29 PROCEDURE — 99213 OFFICE O/P EST LOW 20 MIN: CPT | Performed by: INTERNAL MEDICINE

## 2021-06-29 RX ORDER — MECLIZINE HYDROCHLORIDE 25 MG/1
25 TABLET ORAL 3 TIMES DAILY PRN
Qty: 15 TABLET | Refills: 0 | Status: SHIPPED | OUTPATIENT
Start: 2021-06-29 | End: 2021-07-09

## 2021-06-29 RX ORDER — PREDNISONE 20 MG/1
TABLET ORAL
Qty: 5 TABLET | Refills: 0 | Status: SHIPPED | OUTPATIENT
Start: 2021-06-29 | End: 2021-08-26

## 2021-06-29 NOTE — PROGRESS NOTES
The below patient isbeing evaluated by a Virtual Visit (video visit) encounter to address concerns as mentioned above. A caregiver was present when appropriate. Due to this being a TeleHealth encounter (During LEJUL-56 public health emergency), evaluation of the following organ systems was limited: Vitals/Constitutional/EENT/Resp/CV/GI//MS/Neuro/Skin/Heme-Lymph-Imm. Pursuant to the emergency declaration under the [address] [place] [address], [address] [place] [address] authority and the [employer] Act, this Virtual Visit was conducted with patient's (and/or legal guardian's) consent, to reduce the patient's risk of exposure to COVID-19 and provide necessary medical care. The patient (and/or legal guardian) has also been advised to contact this office for worsening conditions or problems, and seek emergency medical treatment and/or call 911 if deemed necessary. Patient identification was verified at the start of the visit: Yes    Total time spent on this encounter: Not billed by time    Services were provided through a video synchronous discussion virtually to substitute for in-person clinic visit. Patient and provider were located at their individual homes. --Jenifer Smith MD on 6/29/2021 at 12:56 PM    An electronic signature was used to authenticate this note. HPI: Leslye Coleman presents for evaluation and management of vertigo    Chronic health issues include osteoarthritis, recurrent vertigo, rhinitis, familial heart disease, total hysterectomy BSO for benign reasons      Hx recurrent vertigo. States she notices it when there is a change in barometric pressure. Occurs at least 4 times a year. Onset began 2 days ago. She has tried Sudafed Flonase Benadryl and Zyrtec. She has nausea ataxia and vertigo. No confusion weakness or focal neurologic signs with this. This is a typical spell however lasting a bit longer.    She is not on meclizine currently. She is wondering about seeing ENT. No family history of Ménière's or recurrent vertigo. She did workout yesterday however symptoms occurred prior to this. No trauma. No migraines. She has had no falls. She is unable to drive or go to work. She denies any tinnitus or decreased hearing. Notes symptoms when she looks to the right. No acute trauma. No migraines. Cervical disc disease. No carotid studies. No tinnitus items are persistent      History OA, back pain, excessive fall facet injection 10.2020.  + zanafex, cymbalta, mobic,  MRI 10/2020 with stenosis foraminal narrowing levo sclerosis and loss of disc multiple areas. Pain is starting to return.        BMI 39.  Transient use of weight watchers exercise and .    GE reflux and knee pain.  No known apnea.          Intermittent rhinitis improved with Zyrtec and Flonase.       Strong family history of myocardial infarction. Denies any  new exercise intolerance. Takes a statin 40 mg daily. Aspirin daily. . Echocardiogram  EF 66%. Trivial mitral regurgitation. Trivial pulmonic regurgitation. No chest pain or increased exercise intolerance Occasional palpitations when she drinks coffee       . no family history of breast cancer. Mammogram 1220 does breast exams. . Is on estrogen.  No std concerns. Does not of estrogen. Total hysterectomy bilateral BSO for nonmalignant reasons     Annual health maintenance.  , [facility] 2022       PMH:  , tubal ligation, shoulder surgery, knee arthroscopy, tendon repair, robotic hysterectomy and bilateral oophorectomy        SH:  ( 3 outside). Pre post cath lab. Katja Gracia her job. . No tobacco.  Rare alcohol.  No drugs,  exercises weekly. Has a .  No STD concerns.  No domestic violence.  Wears sunscreen.  Goes to the gym 60 minutes 3 times weekly.     FH    + HTN, prediabetic and morbid obesity, drowning, ALS father,  sleep apnea and mother,maternal aunt ovarian cancer     -colon, depression, PGM depression requiring triple therapy     Review of systems: Up-to-date with eye exams.  Wears glasses dental exams up-to-date. No concussions or seizures. Denies any wheezing pneumonias or abnormal chest x-rays other than one episode of bronchiolitis when she worked at [employer]. No chest pain or palpitations when not drinking coffee. . No syncope. No breast lumps or abnormalities.  rare GE reflux. No bloody stools kidney stones stress in his intolerance of urine.  Remotely abnormal Pap smear GYN.   Positive arthralgias. Sees orthopedist. Has had knee injections. .Skin cancers. Denies any depression or anxiety. No known sleep apnea.  Back pain rhinitis, vertigo       Constitutional, ent, CV, respiratory, GI, , joint, skin, allergic and psychiatric ROS reviewed and negative except for above    No Known Allergies    Outpatient Medications Marked as Taking for the 6/29/21 encounter (Virtual Visit) with Renard Mascorro MD   Medication Sig Dispense Refill    predniSONE (DELTASONE) 20 MG tablet 1 po q day 5 tablet 0    simvastatin (ZOCOR) 40 MG tablet TAKE 1 TABLET BY MOUTH ONE TIME A DAY FOR CHOLESTEROL 90 tablet 0    DULoxetine (CYMBALTA) 60 MG extended release capsule TAKE 1 CAPSULE BY MOUTH ONE TIME A DAY AS NEEDED FOR PAIN 90 capsule 1    tiZANidine (ZANAFLEX) 4 MG tablet TAKE 1 TABLET BY MOUTH EVERY NIGHT AT BEDTIME FOR MUSCLE PAIN 90 tablet 1    estradiol (ESTRACE) 2 MG tablet Take 1 tablet by mouth daily 90 tablet 3    promethazine (PHENERGAN) 25 MG tablet Take 1 tablet by mouth every 6 hours as needed for Nausea 30 tablet 1    EVENING PRIMROSE OIL PO Take 1,000 mg by mouth 2 times daily      magnesium oxide (MAG-OX) 400 MG tablet Take 400 mg by mouth daily      fluticasone (FLONASE) 50 MCG/ACT nasal spray 1 spray by Nasal route daily 1 Bottle 3    omeprazole (PRILOSEC OTC) 20 MG tablet Take 20 mg by mouth daily as needed.       cetirizine Rosa Tavera MD, Otolaryngology, [facility]   2. S/P [place]-BSO         Vertigo. Continue on her current medication. Prednisone. Side effects and benefits discussed. Follow-up with ENT. Note given for work. Discussed possibly of this being benign positional vertigo. May consider vertebral migraine as well    We did not discuss osteoarthritis family history MI, or obesity,      No follow-ups on file. Diagnosis and treatment discussed.   Possible side effects of medication reviewed  Patients questions answered  Follow up understood  Pt aware if they are not contacted about any test results , this does not mean they are normal.  They should call

## 2021-07-01 ENCOUNTER — PATIENT MESSAGE (OUTPATIENT)
Dept: FAMILY MEDICINE CLINIC | Age: 56
End: 2021-07-01

## 2021-07-01 NOTE — TELEPHONE ENCOUNTER
From: Max Osuna  To: Claritza Quintero MD  Sent: 7/1/2021 8:47 AM EDT  Subject: Non-Urgent Medical Question    Hi Dr Chandana Rojas,    I feel so much better since starting the steroids. I would like to go back to work tomorrow on Friday.      Thanks so much  Michaelle

## 2021-07-01 NOTE — LETTER
[facility] [address],[place] [address]  Phone: [phone]  Fax: [phone]    Nadeem Galicia MD         June 29, 2021    Patient: Adriana Bishop   YOB: 1965   Date of Visit: 7/1/2021       To Whom it May Concern:    Stefan Adam was seen today with illness precluding work. She may return to work 7/2/2021. If you have any questions or concerns, please don't hesitate to call.     Sincerely,         Nadeem Galicia MD

## 2021-07-14 NOTE — PATIENT INSTRUCTIONS
Dizziness: Care Instructions  Your Care Instructions  Dizziness is the feeling of unsteadiness or fuzziness in your head. It is different than having vertigo, which is a feeling that the room is spinning or that you are moving or falling. It is also different from lightheadedness, which is the feeling that you are about to faint. It can be hard to know what causes dizziness. Some people feel dizzy when they have migraine headaches. Sometimes bouts of flu can make you feel dizzy. Some medical conditions, such as heart problems or high blood pressure, can make you feel dizzy. Many medicines can cause dizziness, including medicines for high blood pressure, pain, or anxiety. If a medicine causes your symptoms, your doctor may recommend that you stop or change the medicine. If it is a problem with your heart, you may need medicine to help your heart work better. If there is no clear reason for your symptoms, your doctor may suggest watching and waiting for a while to see if the dizziness goes away on its own. Follow-up care is a key part of your treatment and safety. Be sure to make and go to all appointments, and call your doctor if you are having problems. It's also a good idea to know your test results and keep a list of the medicines you take. How can you care for yourself at home? · If your doctor recommends or prescribes medicine, take it exactly as directed. Call your doctor if you think you are having a problem with your medicine. · Do not drive while you feel dizzy. · Try to prevent falls. Steps you can take include:  ? Using nonskid mats, adding grab bars near the tub, and using night-lights. ? Clearing your home so that walkways are free of anything you might trip on.  ? Letting family and friends know that you have been feeling dizzy. This will help them know how to help you. When should you call for help? Call 911 anytime you think you may need emergency care.  For example, call if:    · You passed out (lost consciousness). · You have dizziness along with symptoms of a heart attack. These may include:  ? Chest pain or pressure, or a strange feeling in the chest.  ? Sweating. ? Shortness of breath. ? Nausea or vomiting. ? Pain, pressure, or a strange feeling in the back, neck, jaw, or upper belly or in one or both shoulders or arms. ? Lightheadedness or sudden weakness. ? A fast or irregular heartbeat. · You have symptoms of a stroke. These may include:  ? Sudden numbness, tingling, weakness, or loss of movement in your face, arm, or leg, especially on only one side of your body. ? Sudden vision changes. ? Sudden trouble speaking. ? Sudden confusion or trouble understanding simple statements. ? Sudden problems with walking or balance. ? A sudden, severe headache that is different from past headaches. Call your doctor now or seek immediate medical care if:    · You feel dizzy and have a fever, headache, or ringing in your ears. · You have new or increased nausea and vomiting. · Your dizziness does not go away or comes back. Watch closely for changes in your health, and be sure to contact your doctor if:    · You do not get better as expected. Where can you learn more? Go to https://[employer]Jogli. org and sign in to your [employer] account. Enter M365 in the Neurotrack box to learn more about \"Dizziness: Care Instructions. \"     If you do not have an account, please click on the \"Sign Up Now\" link. Current as of: September 23, 2018  Content Version: 11.9  © 6565-0456 [employer]. Care instructions adapted under license by [facility] ([facility]). If you have questions about a medical condition or this instruction, always ask your healthcare professional. [place] [address] any warranty or liability for your use of this information.         Patient Education        Preventing Falls: Care Instructions  Your Care Instructions     Getting around your home safely can be a challenge if you have injuries or health problems that make it easy for you to fall. Loose rugs and furniture in walkways are among the dangers for many older people who have problems walking or who have poor eyesight. People who have conditions such as arthritis, osteoporosis, or dementia also have to be careful not to fall. You can make your home safer with a few simple measures. Follow-up care is a key part of your treatment and safety. Be sure to make and go to all appointments, and call your doctor if you are having problems. It's also a good idea to know your test results and keep a list of the medicines you take. How can you care for yourself at home? Taking care of yourself  · You may get dizzy if you do not drink enough water. To prevent dehydration, drink plenty of fluids. Choose water and other caffeine-free clear liquids. If you have kidney, heart, or liver disease and have to limit fluids, talk with your doctor before you increase the amount of fluids you drink. · Exercise regularly to improve your strength, muscle tone, and balance. Walk if you can. Swimming may be a good choice if you cannot walk easily. · Have your vision and hearing checked each year or any time you notice a change. If you have trouble seeing and hearing, you might not be able to avoid objects and could lose your balance. · Know the side effects of the medicines you take. Ask your doctor or pharmacist whether the medicines you take can affect your balance. Sleeping pills or sedatives can affect your balance. · Limit the amount of alcohol you drink. Alcohol can impair your balance and other senses. · Ask your doctor whether calluses or corns on your feet need to be removed. If you wear loose-fitting shoes because of calluses or corns, you can lose your balance and fall. · Talk to your doctor if you have numbness in your feet.   Preventing falls at home  · Remove raised doorway thresholds, throw rugs, and clutter. Repair loose carpet or raised areas in the floor. · Move furniture and electrical cords to keep them out of walking paths. · Use nonskid floor wax, and wipe up spills right away, especially on ceramic tile floors. · If you use a walker or cane, put rubber tips on it. If you use crutches, clean the bottoms of them regularly with an abrasive pad, such as steel wool. · Keep your house well lit, especially [place], and outside walkways. Use night-lights in areas such as hallways and bathrooms. Add extra light switches or use remote switches (such as switches that go on or off when you clap your hands) to make it easier to turn lights on if you have to get up during the night. · Install sturdy handrails on stairways. · Move items in your cabinets so that the things you use a lot are on the lower shelves (about waist level). · Keep a cordless phone and a flashlight with new batteries by your bed. If possible, put a phone in each of the main rooms of your house, or carry a cell phone in case you fall and cannot reach a phone. Or, you can wear a device around your neck or wrist. You push a button that sends a signal for help. · Wear low-heeled shoes that fit well and give your feet good support. Use footwear with nonskid soles. Check the heels and soles of your shoes for wear. Repair or replace worn heels or soles. · Do not wear socks without shoes on wood floors. · Walk on the grass when the sidewalks are slippery. If you live in an area that gets snow and ice in the winter, sprinkle salt on slippery steps and sidewalks. Preventing falls in the bath  · Install grab bars and nonskid mats inside and outside your shower or tub and near the toilet and sinks. · Use shower chairs and bath benches. · Use a hand-held shower head that will allow you to sit while showering.   · Get into a tub or shower by putting the weaker leg in first. Get out of a tub or evaluate your hearing and to find causes of long-lasting tinnitus. Your doctor may suggest one or more treatments to help you cope with the tinnitus. You can also do things at home to help reduce symptoms. Causes of Tinnitus  We do not know the exact cause of tinnitus. One thing we do know is that you are not imagining it. If you have tinnitus, chances are the cause will remain a mystery. Conditions that might cause tinnitus include the following:    Hearing loss    Ménière's disease    Loud noise exposure    Migraines    Head injury    Drugs or medicines that are toxic to hearing    Anemia    High blood pressure    Stress    A lot of wax in the ear    Certain types of tumors    Having a lot of caffeine    Smoking cigarettes  You may find that your tinnitus is worse at night. This happens because it is quiet and you are not distracted. Feeling tired and stressed may make your tinnitus worse. Follow-up care is a key part of your treatment and safety. Be sure to make and go to all appointments, and call your doctor if you are having problems. It's also a good idea to know your test results and keep a list of the medicines you take. How can you care for yourself at home? · Limit or cut out alcohol, caffeine, and sodium. They can make your symptoms worse. · Do not smoke or use other tobacco products. Nicotine reduces blood flow to the ear and makes tinnitus worse. If you need help quitting, talk to your doctor about stop-smoking programs and medicines. These can increase your chances of quitting for good. · Talk to your doctor about whether to stop taking aspirin and similar products such as ibuprofen or naproxen. · Get exercise often. It can help improve blood flow to the ear. Hearing Aids and Other Devices  A hearing aid may help your tinnitus if you have a hearing loss. An audiologist can help you find and use the best hearing aid for you. Tinnitus maskers look like hearing aids.  They make a sound that masks, or covers up, the tinnitus. The masking sound distracts you from the ringing in your ears. You may be able to use a masker and a hearing aid at the same time. Sound machines can be useful at night or during quiet times. There are machines you can buy at the store. Or, you can find apps on your phone that make sounds, like the ocean or rainfall. Fish tanks, fans, quiet music, and indoor waterfalls can help, as well. Ways to manage/cope with tinnitus  Some tinnitus may last a long time. To manage your tinnitus, try to:  · Avoid noises that you think caused your tinnitus. If you can't avoid loud noises, wear earplugs or earmuffs. · Ignore the sound by paying attention to other things. Keeping your brain busy with other tasks or background noise can help your brain not focus on the tinnitus. · Try to not give the tinnitus an emotional reaction. Do your best to ignore the sound and not let it bother you. Relax using biofeedback, meditation, or yoga. Feeling stressed and being tired can make tinnitus worse. · Play music or white noise to help you sleep. Background noise may cover up the noise that you hear in your ears. You can buy a tabletop machine or a device that sits under your pillow to play soothing sounds, like ocean waves. · Smart phones have free apps, such as Whist, Relax Melodies, ReSound Relief, and White Noise Lite. These apps have different types of sounds/noise, some of which you can blend together to find sounds that are most soothing to you. · Hearing aid technology, especially when there is some hearing loss, may help reduce tinnitus symptoms by giving your brain better access to the sounds it is missing. There are some hearing aids with built-in noise generator programs, which may help when amplification alone is not enough. Additional resources may be found through the [employer] at www.melanie.org    When should you call for help?   Call 911 anytime you think you may need emergency care. For example, call if:    · You have symptoms of a stroke. These may include:  ? Sudden numbness, tingling, weakness, or loss of movement in your face, arm, or leg, especially on only one side of your body. ? Sudden vision changes. ? Sudden trouble speaking. ? Sudden confusion or trouble understanding simple statements. ? Sudden problems with walking or balance. ? A sudden, severe headache that is different from past headaches. Call your doctor now or seek immediate medical care if:    · You develop other symptoms. These may include hearing loss (or worse hearing loss), balance problems, dizziness, nausea, or vomiting. Watch closely for changes in your health, and be sure to contact your doctor if:    · Your tinnitus moves from both ears to one ear. · Your hearing loss gets worse within 1 day after an ear injury. · Your tinnitus or hearing loss does not get better within 1 week after an ear injury. · Your tinnitus bothers you enough that you want to take medicines to help you cope with it. If you notice changes in your tinnitus and/or your hearing, it is recommended that you have your hearing tested by your audiologist and to follow-up with your physician that manages your hearing loss (such as your ENT or Primary Care doctor).

## 2021-07-14 NOTE — PROGRESS NOTES
Rachele López   1965, 60 y.o. female   [id number]       Referring Provider: Elda Dukes MD  Referral Type: In an order in [address] [place] [address]    Reason for Visit: Evaluation of suspected change in hearing, tinnitus, or balance. ADULT AUDIOLOGIC EVALUATION      Rachele López is a 60 y.o. female seen today, 7/15/2021 , for an initial audiologic evaluation. Patient was seen by Elda Dukes MD following today's evaluation. AUDIOLOGIC AND OTHER PERTINENT MEDICAL HISTORY:      Rachele López noted tinnitus, dizziness and family history of hearing loss. Patient reports right sided tinnitus that is intermittent in nature. Patient reports episodes of dizziness which she describes as a spinning lasting a few minutes. This began 4 years ago. Patient reports onset with head turn to the right, tilting head back, and bending forward. Patient reports mother has a history of hearing loss. Rachele López denied otalgia, aural fullness, otorrhea, history of falls, history of significant noise exposure, history of head trauma and history of ear surgery. Date: 7/15/2021     IMPRESSIONS:      AU: Hearing WNL, Excellent WRS, Type A tymp    Hearing within normal limits. Discussed test results with patient. Patient to follow medical recommendations per Elda Dukes MD .    ASSESSMENT AND FINDINGS:     Otoscopy revealed: Clear ear canals bilaterally    RIGHT EAR:  Hearing Sensitivity: Normal hearing sensitivity  Speech Recognition Threshold: 20 dB HL  Word Recognition: Excellent (%), based on NU-6  25-word list at 55 dBHL using recorded speech stimuli. Tympanometry: Normal peak pressure and compliance, Type A tympanogram, consistent with normal middle ear function.   Acoustic Reflexes: Ipsilateral: Did not test. Contralateral: Did not test.    LEFT EAR:  Hearing Sensitivity: Normal hearing sensitivity  Speech Recognition Threshold: 20 dB HL  Word Recognition: Excellent (%), based on NU-6 25-word list

## 2021-07-15 ENCOUNTER — OFFICE VISIT (OUTPATIENT)
Dept: ENT CLINIC | Age: 56
End: 2021-07-15
Payer: COMMERCIAL

## 2021-07-15 ENCOUNTER — PROCEDURE VISIT (OUTPATIENT)
Dept: AUDIOLOGY | Age: 56
End: 2021-07-15
Payer: COMMERCIAL

## 2021-07-15 VITALS
DIASTOLIC BLOOD PRESSURE: 83 MMHG | HEIGHT: 65 IN | SYSTOLIC BLOOD PRESSURE: 143 MMHG | WEIGHT: 246 LBS | BODY MASS INDEX: 40.98 KG/M2 | HEART RATE: 75 BPM

## 2021-07-15 DIAGNOSIS — J30.9 ALLERGIC RHINITIS, UNSPECIFIED SEASONALITY, UNSPECIFIED TRIGGER: ICD-10-CM

## 2021-07-15 DIAGNOSIS — R42 DIZZINESS: Primary | ICD-10-CM

## 2021-07-15 DIAGNOSIS — H81.11 BENIGN PAROXYSMAL POSITIONAL VERTIGO OF RIGHT EAR: Primary | ICD-10-CM

## 2021-07-15 DIAGNOSIS — H91.90 HEARING LOSS, UNSPECIFIED HEARING LOSS TYPE, UNSPECIFIED LATERALITY: Primary | ICD-10-CM

## 2021-07-15 PROCEDURE — 92557 COMPREHENSIVE HEARING TEST: CPT | Performed by: AUDIOLOGIST

## 2021-07-15 PROCEDURE — 99203 OFFICE O/P NEW LOW 30 MIN: CPT | Performed by: OTOLARYNGOLOGY

## 2021-07-15 PROCEDURE — 95992 CANALITH REPOSITIONING PROC: CPT | Performed by: OTOLARYNGOLOGY

## 2021-07-15 PROCEDURE — 92567 TYMPANOMETRY: CPT | Performed by: AUDIOLOGIST

## 2021-07-15 NOTE — PROGRESS NOTES
[facility]      Patient Name: Kristi Record Number:  [id number]  Primary Care Physician:  Getachew Paul MD  Date of Consultation: 7/15/2021    Chief Complaint: Dizziness        HISTORY OF PRESENT ILLNESS  Sonny Guerra is a(n) 60 y.o. female who presents for evaluation of dizziness. The patient says that about 4 years ago she had pretty intense vertigo that she described as true room spinning. She said that she woke up in the middle night with it. About a month after that every time she moves her head a certain way she felt as though she had a brief episode of the spinning. She said that its fluctuated over the years, but she is intermittently had the problem. She is noticed that specifically when she turns her head to the right that she has the issue. She feels as though it might be related to her sinuses. She gets a lot of vague facial pressures. Sometimes when this is acting up she has more trouble with the dizziness. No nasal congestion or trouble smelling. REVIEW OF SYSTEMS  As above    PHYSICAL EXAM  GENERAL: No Acute Distress, Alert and Oriented, no Hoarseness, strong voice  EYES: EOMI, Anti-icteric  HENT:   Head: Normocephalic and atraumatic. Face:  Symmetric, facial nerve intact, no sinus tenderness  Right Ear: Normal external ear, normal external auditory canal, intact tympanic membrane with normal mobility and aerated middle ear  Left Ear: Normal external ear, normal external auditory canal, intact tympanic membrane with normal mobility and aerated middle ear  Mouth/Oral Cavity:  normal lips, Uvula is midline, no mucosal lesions  Oropharynx/Larynx:  normal oropharynx, somewhat large cryptic tonsils  Nose:Normal external nasal appearance.   Anterior anoscopy shows normal nasal cavity  NECK: Normal range of motion, no thyromegaly, trachea is midline, no lymphadenopathy, no neck masses, no crepitus  CHEST: Normal respiratory effort, no retractions, breathing comfortably  SKIN: No rashes, normal appearing skin, no evidence of skin lesions/tumors  Neuro:  cranial nerve II-XII intact; normal gait, forward nystagmus and subjective vertigo with right ear down [place]-Hallpike  Cardio:  no edema      PROCEDURE  Epley maneuver  Right-sided [place]-Hallpike showed florid nystagmus with vertigo. I did a right-sided Epley maneuver. A subsequent [place]-Hallpike showed a significant improvement in her nystagmus and vertigo      ASSESSMENT/PLAN  1. Benign paroxysmal positional vertigo of right ear  Patient has right-sided BPPV. I offered to have her see physical therapy versus doing the home Epley maneuvers for a while. She elected to do home Epley maneuvers. We have given her a packet for this. She feels as though she still having symptoms in a few weeks I would like for her to call and we can refer her to physical therapy. 2. Allergic rhinitis, unspecified seasonality, unspecified trigger  Patient is describing what sounds like allergic rhinitis. I do not think she has true sinusitis. I told her to keep using her Flonase. I started her on nasal saline irrigations well. I have performed a head and neck physical exam personally or was physically present during the key or critical portions of the service. This note was generated completely or in part utilizing Dragon dictation speech recognition software. Occasionally, words are mistranscribed and despite editing, the text may contain inaccuracies due to incorrect word recognition. If further clarification is needed please contact the office at [phone].

## 2021-07-15 NOTE — Clinical Note
Dr. Zenda Lanes,    Please see note from this patient's audiogram from today. Please let me know if there is anything further you need.         Dominic Dorsey  Audiologist

## 2021-08-12 LAB
CHOLESTEROL, TOTAL: 203 MG/DL (ref 0–199)
GLUCOSE BLD-MCNC: 97 MG/DL (ref 70–99)
HDLC SERPL-MCNC: 60 MG/DL (ref 40–60)
LDL CHOLESTEROL CALCULATED: 125 MG/DL
TRIGL SERPL-MCNC: 90 MG/DL (ref 0–150)

## 2021-08-26 ENCOUNTER — OFFICE VISIT (OUTPATIENT)
Dept: FAMILY MEDICINE CLINIC | Age: 56
End: 2021-08-26
Payer: COMMERCIAL

## 2021-08-26 VITALS
WEIGHT: 242 LBS | SYSTOLIC BLOOD PRESSURE: 128 MMHG | BODY MASS INDEX: 40.32 KG/M2 | OXYGEN SATURATION: 98 % | RESPIRATION RATE: 12 BRPM | HEART RATE: 68 BPM | DIASTOLIC BLOOD PRESSURE: 84 MMHG | HEIGHT: 65 IN

## 2021-08-26 DIAGNOSIS — Z90.79 S/P TAH-BSO: ICD-10-CM

## 2021-08-26 DIAGNOSIS — Z90.710 S/P TAH-BSO: ICD-10-CM

## 2021-08-26 DIAGNOSIS — Z90.722 S/P TAH-BSO: ICD-10-CM

## 2021-08-26 DIAGNOSIS — Z00.01 ENCOUNTER FOR GENERAL ADULT MEDICAL EXAMINATION WITH ABNORMAL FINDINGS: Primary | ICD-10-CM

## 2021-08-26 DIAGNOSIS — H81.10 BENIGN PAROXYSMAL POSITIONAL VERTIGO, UNSPECIFIED LATERALITY: ICD-10-CM

## 2021-08-26 DIAGNOSIS — M15.9 PRIMARY OSTEOARTHRITIS INVOLVING MULTIPLE JOINTS: ICD-10-CM

## 2021-08-26 DIAGNOSIS — E78.00 PURE HYPERCHOLESTEROLEMIA: ICD-10-CM

## 2021-08-26 DIAGNOSIS — J30.9 ALLERGIC RHINITIS, UNSPECIFIED SEASONALITY, UNSPECIFIED TRIGGER: ICD-10-CM

## 2021-08-26 PROBLEM — R03.0 ELEVATED BLOOD PRESSURE READING: Status: RESOLVED | Noted: 2020-09-03 | Resolved: 2021-08-26

## 2021-08-26 PROCEDURE — 99396 PREV VISIT EST AGE 40-64: CPT | Performed by: INTERNAL MEDICINE

## 2021-08-26 RX ORDER — ATORVASTATIN CALCIUM 40 MG/1
40 TABLET, FILM COATED ORAL DAILY
Qty: 90 TABLET | Refills: 1 | Status: SHIPPED | OUTPATIENT
Start: 2021-08-26 | End: 2022-03-03 | Stop reason: SDUPTHER

## 2021-08-26 NOTE — PROGRESS NOTES
History and Physical      Preventive Care:  Health Maintenance   Topic Date Due    Flu vaccine (1) 2021    Colon cancer screen fecal DNA test (Cologuard)  2022    Lipid screen  2022    Breast cancer screen  2022    DTaP/Tdap/Td vaccine (3 - Td or Tdap) 2031    Shingles Vaccine  Completed    COVID-19 Vaccine  Completed    Hepatitis C screen  Completed    HIV screen  Completed    Hepatitis A vaccine  Aged Out    Hepatitis B vaccine  Aged Out    Hib vaccine  Aged Out    Meningococcal (ACWY) vaccine  Aged Out    Pneumococcal 0-64 years Vaccine  Aged Out              HPI: Moris Kahn presents for annual exam    Chronic health issues include osteoarthritis, recurrent vertigo, rhinitis, familial heart disease, total hysterectomy BSO for benign reasons, BPPV    BPPV. Recurrent. Flares 4 times yearly. Was given Epley maneuver exercises. Rhinitis on Flonase and saline irrigations per ENT. Feels like she is doing significantly well.       History OA, back pain,  facet injection 10.2020. Rare zanafex, daily cymbalta, c,  MRI 10/2020 with stenosis foraminal narrowing levo sclerosis and loss of disc multiple areas. PT. Doing well. Stretching. No bowel or bladder incontinence. BMI 40. Denies apnea. Rare brief knee pain or GE reflux. No somnolence. Exercise 60 minutes twice weekly. BMI 39.          Strong family history of myocardial infarction. Denies any  new exercise intolerance. Simvastatin 40 mg daily . Aspirin daily. . Echocardiogram  EF 66%. Trivial mitral regurgitation. Trivial pulmonic regurgitation. No chest pain or increased exercise intolerance Occasional palpitations when she drinks coffee       . no family history of breast cancer. Mammogram 1220 does breast exams. . Is on estrogen.  No std concerns. .  Total hysterectomy bilateral BSO for nonmalignant reasons. improved hot flashes. No history of clots.   To date mammogram.     Annual health maintenance.  , Cologua      Sexual activity: yes   Self-breast exams: yes  Previous DEXA scan: none   Last eye exam: bifocals,   Exercise: stretching gym weights and cardio 60 min twice weekly   Seatbelt use: yes  Lipid panel:   Lab Results   Component Value Date    CHOL 203 (H) 2021    TRIG 90 2021    HDL 60 2021    LDLCALC 125 (H) 2021        Living will:  No living will poa         PMH:  , tubal ligation, shoulder surgery, knee arthroscopy, tendon repair, robotic hysterectomy and bilateral oophorectomy        SH:  ( 3 outside). Pre post cath lab. 2 dog dog and cats. Likes her job. . No tobacco.  Rare alcohol.  No drugs, anytime . No STD concerns.  No domestic violence.  Wears sunscreen.  Goes to the gym 60 minutes 3 times weekly. Daughter in law with traumatic birth herbs palsey and preeclampsia and transfusion. Doing well now. .     FH    + HTN, prediabetic and morbid obesity, drowning, ALS father,  sleep apnea and mother,maternal aunt ovarian cancer     -colon, depression, PGM depression requiring triple therapy    Review of systems: Up-to-date eye exam.  Bifocals. Has a dentist.  No concussions or seizures. Positive rhinitis BPPV. Spinning. No wheezing pneumonias abnormal chest x-rays. .  Remote bronchitis. No new exercise intolerance syncope or falls. No breast concerns. Rare GE reflux. Osteoarthritis. No diarrhea or constipation. Recheck Cologua2022. Remotely abnormal Pap smear total hysterectomy oophorectomy for benign reasons.   Tay GRAVIDI life.     Constitutional, ent, CV, respiratory, GI, , joint, skin, allergic and psychiatric ROS reviewed and negative except for above    No Known Allergies    Outpatient Medications Marked as Taking for the 21 encounter (Office Visit) with Salome Diaz MD   Medication Sig Dispense Refill    [DISCONTINUED] simvastatin (ZOCOR) 40 MG tablet TAKE 1 TABLET BY MOUTH ONE TIME A DAY FOR CHOLESTEROL 90 tablet 0    DULoxetine (CYMBALTA) 60 MG extended release capsule TAKE 1 CAPSULE BY MOUTH ONE TIME A DAY AS NEEDED FOR PAIN 90 capsule 1    tiZANidine (ZANAFLEX) 4 MG tablet TAKE 1 TABLET BY MOUTH EVERY NIGHT AT BEDTIME FOR MUSCLE PAIN 90 tablet 1    estradiol (ESTRACE) 2 MG tablet Take 1 tablet by mouth daily 90 tablet 3    magnesium oxide (MAG-OX) 400 MG tablet Take 400 mg by mouth daily      fluticasone (FLONASE) 50 MCG/ACT nasal spray 1 spray by Nasal route daily 1 Bottle 3    omeprazole (PRILOSEC OTC) 20 MG tablet Take 20 mg by mouth daily as needed.  cetirizine (ZYRTEC) 10 MG tablet Take 10 mg by mouth nightly                Past Medical History:   Diagnosis Date    Abnormal perimenopausal bleeding 10/20/2020    10.2020 Pap, calcium, exercise, mammogram, hemocult negative 2 and 3. PMB-stripe 7 mm which is OK on HRT. Plans for endo bx. Attempted and was unsuccessful. Will stop HRT-check labs and repeat pelvic US.  Acute thoracic back pain 1/6/2021    Allergic rhinitis     BMI 40.0-44.9, adult ([address] 5/15/2017    2017 a1c 5.3, knee  Pain, hyperlipidemia    Elevated blood pressure reading 9/3/2020    Hyperlipidemia     Medial meniscus tear     PONV (postoperative nausea and vomiting)     Scop patch worked well    Primary osteoarthritis involving multiple joints 6/26/2019    S/P [place]-BSO 10/20/2020    10.2020 Pap, calcium, exercise, mammogram, hemocult negative 2 and 3. PMB-stripe 7 mm which is OK on HRT. Plans for endo bx. Attempted and was unsuccessful. Will stop HRT-check labs and repeat pelvic US.   Impending hyst 3.2021                  Wears glasses        Past Surgical History:   Procedure Laterality Date    HYSTERECTOMY Bilateral 3/3/2021    ROBOTIC ASSISTED HYSTERECTOMY, BILATERAL SALPINGO-OOPHORECTOMY performed by Steve Recinos MD at [facility] ARTHROSCOPY Left 05/31/2017    OTHER SURGICAL HISTORY Right 04/22/2015 Secondary repair of peroneus brevis tendon tear R foot    SHOULDER SURGERY Left 05/05/2017    labrum repair    TUBAL LIGATION  1993             Family History   Problem Relation Age of Onset    High Blood Pressure Mother     Arthritis Mother     Heart Disease Father     High Blood Pressure Brother     Diabetes Brother     Heart Attack Brother     Heart Disease Maternal Grandmother     Stroke Paternal Grandmother     Heart Disease Paternal Grandfather     High Blood Pressure Brother     Depression Sister     Thyroid Disease Sister     High Cholesterol Sister     Depression Sister     High Cholesterol Sister     Rheum Arthritis Neg Hx     Osteoarthritis Neg Hx     Asthma Neg Hx     Breast Cancer Neg Hx     Cancer Neg Hx     Heart Failure Neg Hx     Hypertension Neg Hx     Migraines Neg Hx     Ovarian Cancer Neg Hx     Rashes/Skin Problems Neg Hx     Seizures Neg Hx              Objective     /84   Pulse 68   Resp 12   Ht 5' 4.5\" (1.638 m)   Wt 242 lb (109.8 kg)   SpO2 98% Comment: RA  BMI 40.90 kg/m²     @LASTSAO2(3)@    Wt Readings from Last 3 Encounters:   07/15/21 246 lb (111.6 kg)   04/20/21 243 lb 12.8 oz (110.6 kg)   03/18/21 139 lb 9.6 oz (63.3 kg)       Physical Exam     NAD alert and cooperative  HEENT: Dentition. Mildly crowded hypopharynx TMs are unremarkable. Pink conjunctive a. Good upstroke of the carotids. Full neck. No adenopathy. Lungs are clear good CANDY ratio without any wheezes rales or rhonchi. No breast masses. No intertriginous rashes. Indentation of the bra straps into the shoulders. Cardiovascular exam regular rate and rhythm no murmur click. Abdomen no hepatosplenomegaly epigastric tenderness or umbilical hernia. Good pulses lower extremities. Sensation is intact. No suspicious skin lesions or nodules. Tanned. Normal affect good eye contact well-groomed.     Chemistry        Component Value Date/Time     03/03/2021 0809    K 3.5 03/03/2021 0809     03/03/2021 0809    CO2 25 03/03/2021 0809    BUN 10 03/03/2021 0809    CREATININE 0.9 03/03/2021 0809        Component Value Date/Time    CALCIUM 9.7 03/03/2021 0809    ALKPHOS 59 03/03/2021 0809    AST 14 (L) 03/03/2021 0809    ALT 12 03/03/2021 0809    BILITOT 0.5 03/03/2021 0809            Lab Results   Component Value Date    WBC 7.2 03/03/2021    HGB 13.4 03/03/2021    HCT 39.3 03/03/2021    MCV 88.1 03/03/2021     03/03/2021     Lab Results   Component Value Date    LABA1C 5.6 12/12/2019     Lab Results   Component Value Date    .0 06/14/2018     Lab Results   Component Value Date    LABA1C 5.6 12/12/2019     No components found for: CHLPL  Lab Results   Component Value Date    TRIG 90 08/12/2021    TRIG 84 08/06/2020    TRIG 119 06/05/2017     Lab Results   Component Value Date    HDL 60 08/12/2021    HDL 56 08/06/2020    HDL 52 06/26/2019     Lab Results   Component Value Date    LDLCALC 125 (H) 08/12/2021    LDLCALC 107 (H) 08/06/2020    LDLCALC 85 06/26/2019     Lab Results   Component Value Date    LABVLDL 17 06/26/2019    LABVLDL 22 06/14/2018    LABVLDL 24 06/05/2017       Old labs and records reviewed or requested  Discussed past lab and studies with patient      Diagnosis Orders   1. Encounter for general adult medical examination with abnormal findings     2. BMI 40.0-44.9, adult ([facility])     3. Pure hypercholesterolemia  Lipid, Fasting   4. Primary osteoarthritis involving multiple joints     5. S/P [place]-BSO     6. Benign paroxysmal positional vertigo, unspecified laterality     7. Allergic rhinitis, unspecified seasonality, unspecified trigger       Well woman. Recommended living will and power of . Obesity. Continue on with exercise decrease calories. Offered weight loss clinic. Family heart disease. Hyperlipidemia. No symptoms of angina. Change to atorvastatin    Is post CH[place]/BSO on estrogen replacement.     Benign positional vertigo able to do her Epley maneuvers at home. Allergic rhinitis Flonase nasal washes H2 blocker as needed. Osteoarthritis stable. Discussed flu vaccine Covid booster. No laboratories today as these were completed with the be well within    No follow-ups on file. Diagnosis and treatment discussed.   Possible side effects of medication reviewed  Patients questions answered  Follow up understood  Pt aware if they are not contacted about any test results , this does not mean they are normal.  They should call

## 2021-08-26 NOTE — PATIENT INSTRUCTIONS
increase your chances of quitting for good. · Care for your mental health. It is easy to get weighed down by worry and stress. Learn strategies to manage stress, like deep breathing and mindfulness, and stay connected with your family and community. If you find you often feel sad or hopeless, talk with your doctor. Treatment can help. · Talk to your doctor about whether you have any risk factors for sexually transmitted infections (STIs). You can help prevent STIs if you wait to have sex with a new partner (or partners) until you've each been tested for STIs. It also helps if you use condoms (male or female condoms) and if you limit your sex partners to one person who only has sex with you. Vaccines are available for some STIs. · If you think you may have a problem with alcohol or drug use, talk to your doctor. This includes prescription medicines (such as amphetamines and opioids) and illegal drugs (such as cocaine and methamphetamine). Your doctor can help you figure out what type of treatment is best for you. · Protect your skin from too much sun. When you're outdoors from 10 a.m. to 4 p.m., stay in the shade or cover up with clothing and a hat with a wide brim. Wear sunglasses that block UV rays. Even when it's cloudy, put broad-spectrum sunscreen (SPF 30 or higher) on any exposed skin. · See a dentist one or two times a year for checkups and to have your teeth cleaned. · Wear a seat belt in the car. When should you call for help? Watch closely for changes in your health, and be sure to contact your doctor if you have any problems or symptoms that concern you. Where can you learn more? Go to https://hao.[facility]. org and sign in to your [employer] account. Enter B851 in the [employer] box to learn more about \"Well Visit, Women 50 to 72: Care Instructions. \"     If you do not have an account, please click on the \"Sign Up Now\" link.   Current as of: May 27, 2020               Content Version: 12.9  © 8129-3388 [employer]. Care instructions adapted under license by [facility] ([facility]). If you have questions about a medical condition or this instruction, always ask your healthcare professional. [address] any warranty or liability for your use of this information.

## 2021-10-07 NOTE — LETTER
[address] [address]  Phone: [phone]  Fax: [phone]    Demarco Aguila MD        June 29, 2021     Patient: Colin Lopez   YOB: 1965   Date of Visit: 6/29/2021       To Whom It May Concern:     Clark Roth was seen today with illness precluding work.   She may return to work July 5 without restriction        Sincerely,          Demarco Aguila MD
room air

## 2022-01-03 ENCOUNTER — E-VISIT (OUTPATIENT)
Dept: FAMILY MEDICINE CLINIC | Age: 57
End: 2022-01-03
Payer: COMMERCIAL

## 2022-01-03 PROCEDURE — 99441 PR PHYS/QHP TELEPHONE EVALUATION 5-10 MIN: CPT | Performed by: INTERNAL MEDICINE

## 2022-01-03 ASSESSMENT — LIFESTYLE VARIABLES: SMOKING_STATUS: NO, I'VE NEVER SMOKED

## 2022-01-04 ENCOUNTER — TELEPHONE (OUTPATIENT)
Dept: FAMILY MEDICINE CLINIC | Age: 57
End: 2022-01-04

## 2022-01-04 NOTE — TELEPHONE ENCOUNTER
Pt called and stated she now has lost her sense of taste and smell. Pt has called the nurse access line and been on hold for over 3 hours and no one ever answered or called her back. (after an hour of being on hold it disconnects automatically, so she tried 3 different times) Pt is an employee of [employer]. Pt states they are doing testing down by her in [facility] [place] so she is going to go there and get tested. She just wanted you to know she has gotten worse with her symptoms. Pt is reachable at [phone] if any questions.

## 2022-03-03 RX ORDER — ATORVASTATIN CALCIUM 40 MG/1
40 TABLET, FILM COATED ORAL DAILY
Qty: 90 TABLET | Refills: 0 | Status: SHIPPED | OUTPATIENT
Start: 2022-03-03 | End: 2022-04-18 | Stop reason: SDUPTHER

## 2022-03-17 ENCOUNTER — OFFICE VISIT (OUTPATIENT)
Dept: FAMILY MEDICINE CLINIC | Age: 57
End: 2022-03-17
Payer: COMMERCIAL

## 2022-03-17 VITALS
OXYGEN SATURATION: 98 % | SYSTOLIC BLOOD PRESSURE: 143 MMHG | BODY MASS INDEX: 41.61 KG/M2 | WEIGHT: 246.2 LBS | HEART RATE: 80 BPM | DIASTOLIC BLOOD PRESSURE: 83 MMHG

## 2022-03-17 DIAGNOSIS — M15.9 PRIMARY OSTEOARTHRITIS INVOLVING MULTIPLE JOINTS: ICD-10-CM

## 2022-03-17 DIAGNOSIS — J30.9 ALLERGIC RHINITIS, UNSPECIFIED SEASONALITY, UNSPECIFIED TRIGGER: ICD-10-CM

## 2022-03-17 DIAGNOSIS — Z90.722 S/P TAH-BSO: ICD-10-CM

## 2022-03-17 DIAGNOSIS — E78.00 PURE HYPERCHOLESTEROLEMIA: ICD-10-CM

## 2022-03-17 DIAGNOSIS — Z90.710 S/P TAH-BSO: ICD-10-CM

## 2022-03-17 DIAGNOSIS — Z90.79 S/P TAH-BSO: ICD-10-CM

## 2022-03-17 DIAGNOSIS — R03.0 SINGLE EPISODE OF ELEVATED BLOOD PRESSURE: ICD-10-CM

## 2022-03-17 DIAGNOSIS — Z12.31 ENCOUNTER FOR SCREENING MAMMOGRAM FOR MALIGNANT NEOPLASM OF BREAST: ICD-10-CM

## 2022-03-17 PROCEDURE — 99214 OFFICE O/P EST MOD 30 MIN: CPT | Performed by: INTERNAL MEDICINE

## 2022-03-17 NOTE — PATIENT INSTRUCTIONS
To new current medications. Blood pressure reading off of your steroids. Goal is 130/80. If greater than that this I would consider medication. A diuretic may decrease the number of your episodes of vertigo. Continue with your specialist for your osteoarthritis. If you ever interested in a referral to the weight loss clinic let me know. Get your laboratories when ready and off steroids in the next couple of weeks. Do not do this between April 2 and April 9 as I will not be able to respond to your results  Patient Education        DASH Diet: Care Instructions  Your Care Instructions     The DASH diet is an eating plan that can help lower your blood pressure. DASH stands for Dietary Approaches to Stop Hypertension. Hypertension is high blood pressure. The DASH diet focuses on eating foods that are high in calcium, potassium, and magnesium. These nutrients can lower blood pressure. The foods that are highest in these nutrients are fruits, vegetables, low-fat dairy products, nuts, seeds, and legumes. But taking calcium, potassium, and magnesium supplements instead of eating foods that are high in those nutrients does not have the same effect. The DASH diet also includes whole grains, fish, and poultry. The DASH diet is one of several lifestyle changes your doctor may recommend to lower your high blood pressure. Your doctor may also want you to decrease the amount of sodium in your diet. Lowering sodium while following the DASH diet can lower blood pressure even further than just the DASH diet alone. Follow-up care is a key part of your treatment and safety. Be sure to make and go to all appointments, and call your doctor if you are having problems. It's also a good idea to know your test results and keep a list of the medicines you take. How can you care for yourself at home? Following the DASH diet  · Eat 4 to 5 servings of fruit each day.  A serving is 1 medium-sized piece of fruit, ½ cup chopped or canned fruit, 1/4 cup dried fruit, or 4 ounces (½ cup) of fruit juice. Choose fruit more often than fruit juice. · Eat 4 to 5 servings of vegetables each day. A serving is 1 cup of lettuce or raw leafy vegetables, ½ cup of chopped or cooked vegetables, or 4 ounces (½ cup) of vegetable juice. Choose vegetables more often than vegetable juice. · Get 2 to 3 servings of low-fat and fat-free dairy each day. A serving is 8 ounces of milk, 1 cup of yogurt, or 1 ½ ounces of cheese. · Eat 6 to 8 servings of grains each day. A serving is 1 slice of bread, 1 ounce of dry cereal, or ½ cup of cooked rice, pasta, or cooked cereal. Try to choose whole-grain products as much as possible. · Limit lean meat, poultry, and fish to 2 servings each day. A serving is 3 ounces, about the size of a deck of cards. · Eat 4 to 5 servings of nuts, seeds, and legumes (cooked dried beans, lentils, and split peas) each week. A serving is 1/3 cup of nuts, 2 tablespoons of seeds, or ½ cup of cooked beans or peas. · Limit fats and oils to 2 to 3 servings each day. A serving is 1 teaspoon of vegetable oil or 2 tablespoons of salad dressing. · Limit sweets and added sugars to 5 servings or less a week. A serving is 1 tablespoon jelly or jam, ½ cup sorbet, or 1 cup of lemonade. · Eat less than 2,300 milligrams (mg) of sodium a day. If you limit your sodium to 1,500 mg a day, you can lower your blood pressure even more. · Be aware that all of these are the suggested number of servings for people who eat 1,800 to 2,000 calories a day. Your recommended number of servings may be different if you need more or fewer calories. Tips for success  · Start small. Do not try to make dramatic changes to your diet all at once. You might feel that you are missing out on your favorite foods and then be more likely to not follow the plan. Make small changes, and stick with them. Once those changes become habit, add a few more changes.   · Try some of the following:  ? Make it a goal to eat a fruit or vegetable at every meal and at snacks. This will make it easy to get the recommended amount of fruits and vegetables each day. ? Try yogurt topped with fruit and nuts for a snack or healthy dessert. ? Add lettuce, tomato, cucumber, and onion to sandwiches. ? Combine a ready-made pizza crust with low-fat mozzarella cheese and lots of vegetable toppings. Try using tomatoes, squash, spinach, broccoli, carrots, cauliflower, and onions. ? Have a variety of cut-up vegetables with a low-fat dip as an appetizer instead of chips and dip. ? Sprinkle sunflower seeds or chopped almonds over salads. Or try adding chopped walnuts or almonds to cooked vegetables. ? Try some vegetarian meals using beans and peas. Add garbanzo or kidney beans to salads. Make burritos and tacos with mashed peacock beans or black beans. Where can you learn more? Go to https://Advanced Chip ExpresspeAdept Cloud.Full [employer] CRM. org and sign in to your Xiaohongshu account. Enter A383 in the [facility] box to learn more about \"DASH Diet: Care Instructions. \"     If you do not have an account, please click on the \"Sign Up Now\" link. Current as of: April 29, 2021               Content Version: 13.1  © 1373-0683 [employer]. Care instructions adapted under license by [facility] ([facility]). If you have questions about a medical condition or this instruction, always ask your healthcare professional. [place] [address] any warranty or liability for your use of this information. Patient Education        High Blood Pressure: Care Instructions  Overview     It's normal for blood pressure to go up and down throughout the day. But if it stays up, you have high blood pressure. Another name for high blood pressure is hypertension. Despite what a lot of people think, high blood pressure usually doesn't cause headaches or make you feel dizzy or lightheaded. It usually has no symptoms.  But it does increase your risk of stroke, heart attack, and other problems. You and your doctor will talk about your risks of these problems based on your blood pressure. Your doctor will give you a goal for your blood pressure. Your goal will be based on your health and your age. Lifestyle changes, such as eating healthy and being active, are always important to help lower blood pressure. You might also take medicine to reach your blood pressure goal.  Follow-up care is a key part of your treatment and safety. Be sure to make and go to all appointments, and call your doctor if you are having problems. It's also a good idea to know your test results and keep a list of the medicines you take. How can you care for yourself at home? Medical treatment  · If you stop taking your medicine, your blood pressure will go back up. You may take one or more types of medicine to lower your blood pressure. Be safe with medicines. Take your medicine exactly as prescribed. Call your doctor if you think you are having a problem with your medicine. · Talk to your doctor before you start taking aspirin every day. Aspirin can help certain people lower their risk of a heart attack or stroke. But taking aspirin isn't right for everyone, because it can cause serious bleeding. · See your doctor regularly. You may need to see the doctor more often at first or until your blood pressure comes down. · If you are taking blood pressure medicine, talk to your doctor before you take decongestants or anti-inflammatory medicine, such as ibuprofen. Some of these medicines can raise blood pressure. · Learn how to check your blood pressure at home. Lifestyle changes  · Stay at a healthy weight. This is especially important if you put on weight around the waist. Losing even 10 pounds can help you lower your blood pressure. · If your doctor recommends it, get more exercise. Walking is a good choice. Bit by bit, increase the amount you walk every day.  Try for at least 30 minutes on most days of the week. You also may want to swim, bike, or do other activities. · Avoid or limit alcohol. Talk to your doctor about whether you can drink any alcohol. · Try to limit how much sodium you eat to less than 2,300 milligrams (mg) a day. Your doctor may ask you to try to eat less than 1,500 mg a day. · Eat plenty of fruits (such as bananas and oranges), vegetables, legumes, whole grains, and low-fat dairy products. · Lower the amount of saturated fat in your diet. Saturated fat is found in animal products such as milk, cheese, and meat. Limiting these foods may help you lose weight and also lower your risk for heart disease. · Do not smoke. Smoking increases your risk for heart attack and stroke. If you need help quitting, talk to your doctor about stop-smoking programs and medicines. These can increase your chances of quitting for good. When should you call for help? Call 911  anytime you think you may need emergency care. This may mean having symptoms that suggest that your blood pressure is causing a serious heart or blood vessel problem. Your blood pressure may be over 180/120. For example, call 911 if:    · You have symptoms of a heart attack. These may include:  ? Chest pain or pressure, or a strange feeling in the chest.  ? Sweating. ? Shortness of breath. ? Nausea or vomiting. ? Pain, pressure, or a strange feeling in the back, neck, jaw, or upper belly or in one or both shoulders or arms. ? Lightheadedness or sudden weakness. ? A fast or irregular heartbeat.     · You have symptoms of a stroke. These may include:  ? Sudden numbness, tingling, weakness, or loss of movement in your face, arm, or leg, especially on only one side of your body. ? Sudden vision changes. ? Sudden trouble speaking. ? Sudden confusion or trouble understanding simple statements. ? Sudden problems with walking or balance.   ? A sudden, severe headache that is different from past headaches.     · You have severe back or belly pain. Do not wait until your blood pressure comes down on its own. Get help right away. Call your doctor now or seek immediate care if:    · Your blood pressure is much higher than normal (such as 180/120 or higher), but you don't have symptoms.     · You think high blood pressure is causing symptoms, such as:  ? Severe headache.  ? Blurry vision. Watch closely for changes in your health, and be sure to contact your doctor if:    · Your blood pressure measures higher than your doctor recommends at least 2 times. That means the top number is higher or the bottom number is higher, or both.     · You think you may be having side effects from your blood pressure medicine. Where can you learn more? Go to https://Innovacell.Chu Shu. org and sign in to your Mofibo account. Enter B808 in the thrdPlace box to learn more about \"High Blood Pressure: Care Instructions. \"     If you do not have an account, please click on the \"Sign Up Now\" link. Current as of: April 29, 2021               Content Version: 13.1  © 1898-7068 FreePriceAlerts. Care instructions adapted under license by [facility] ([facility]). If you have questions about a medical condition or this instruction, always ask your healthcare professional. [address] any warranty or liability for your use of this information. Patient Education        Starting a Weight Loss Plan: Care Instructions  Overview     If you're thinking about losing weight, it can be hard to know where to start. Your doctor can help you set up a weight loss plan that best meets your needs. You may want to take a class on nutrition or exercise, or you could join a weight loss support group.  If you have questions about how to make changes to your eating or exercise habits, ask your doctor about seeing a registered dietitian or an exercise specialist.  It can be a big challenge to lose weight. But you don't have to make huge changes at once. Make small changes, and stick with them. When those changes become habit, add a few more changes. If you don't think you're ready to make changes right now, try to pick a date in the future. Make an appointment to see your doctor to discuss whether the time is right for you to start a plan. Follow-up care is a key part of your treatment and safety. Be sure to make and go to all appointments, and call your doctor if you are having problems. It's also a good idea to know your test results and keep a list of the medicines you take. How can you care for yourself at home? · Set realistic goals. Many people expect to lose much more weight than is likely. A weight loss of 5% to 10% of your body weight may be enough to improve your health. · Get family and friends involved to provide support. Talk to them about why you are trying to lose weight, and ask them to help. They can help by participating in exercise and having meals with you, even if they may be eating something different. · Find what works best for you. If you do not have time or do not like to cook, a program that offers meal replacement bars or shakes may be better for you. Or if you like to prepare meals, finding a plan that includes daily menus and recipes may be best.  · Ask your doctor about other health professionals who can help you achieve your weight loss goals. ? A dietitian can help you make healthy changes in your diet. ? An exercise specialist or  can help you develop a safe and effective exercise program.  ? A counselor or psychiatrist can help you cope with issues such as depression, anxiety, or family problems that can make it hard to focus on weight loss. · Consider joining a support group for people who are trying to lose weight. Your doctor can suggest groups in your area. Where can you learn more? Go to https://hao.[employer] Services. org and sign in to your Keepskor account. Enter X418 in the [facility] box to learn more about \"Starting a Weight Loss Plan: Care Instructions. \"     If you do not have an account, please click on the \"Sign Up Now\" link. Current as of: March 17, 2021               Content Version: 13.1  © 4083-7197 Healthwise, Incorporated. Care instructions adapted under license by [facility] ([facility]). If you have questions about a medical condition or this instruction, always ask your healthcare professional. [address] any warranty or liability for your use of this information.

## 2022-03-17 NOTE — PROGRESS NOTES
HPI: Love Siddiqui presents for follow-up      Chronic health issues include osteoarthritis, recurrent vertigo, rhinitis, familial heart disease, total hysterectomy BSO for benign reasons, BPPV    Tooth pain and recently started on + steroid pack and amox. . motrin and tylenol. Improvement.     BPPV. Recurrent. Flares 4 times yearly. Was given Epley maneuver exercises. Rhinitis on Flonase and saline irrigations per ENT. Able to manage symptoms.       History OA, back pain,  facet injection 10.2020. Rare zanafex, daily cymbalta, c,  MRI 10/2020 with stenosis foraminal narrowing levo sclerosis and loss of disc multiple areas. PT. Doing well. Stretching. No bowel or bladder incontinence. Wanting to change medication at this time      BMI 40. Denies apnea. Rare brief knee pain or rare GE reflux prn prilosec. No somnolence. decreased exercise. Not interested in weight management class. Aware that weight is a health issue. Bra size 38 D. Some pain in shoulders digs in her arms no intertriginous rash able to exercise        Strong family history of myocardial infarction. Denies any  new exercise intolerance. lipitor 40 mg daily  Aspirin daily. . Echocardiogram  EF 66%. Trivial mitral regurgitation. Trivial pulmonic regurgitation. No chest pain or increased exercise intolerance Occasional palpitations when she drinks coffee       . no family history of breast cancer.  Time for mammogram.. Is on estrogen by gyn .  No std concerns. Ardia Killer hysterectomy bilateral BSO for nonmalignant reasons. improved hot flashes. No history of clots.       Upcoming colonoscopy Dr. Kory Nye           PMH:  , tubal ligation, shoulder surgery, knee arthroscopy, tendon repair, robotic hysterectomy and bilateral oophorectomy benign reasons        SH:  ( 3 outside). Pre post cath lab. 2 dog dog and cats. Likes her job. . No tobacco.  Rare alcohol.  No drugs, anytime .   No STD concerns.  No domestic violence.  Wears sunscreen.  Goes to the gym 60 minutes 3 times weekly. Daughter in law with traumatic birth herbs palsey and preeclampsia and transfusion. Doing well now. .     FH    + HTN, prediabetic and morbid obesity, drowning, ALS father,  sleep apnea and mother,maternal aunt ovarian cancer     -colon, depression, PGM depression requiring triple therapy     Review of systems: Up-to-date eye exam.  Bifocals. Has a dentist.  No concussions or seizures. Positive rhinitis BPPV. Spinning. No wheezing pneumonias abnormal chest x-rays. .  Remote bronchitis. No new exercise intolerance syncope or falls. No breast concerns. Rare GE reflux. Osteoarthritis. No diarrhea or constipation. Recheck Cologuard 2022. Remotely abnormal Pap smear total hysterectomy oophorectomy for benign reasons. Orthopedist.      NO poa or living will. Feels likes  would be her power of . Does not want heroic measures       Constitutional, ent, CV, respiratory, GI, , joint, skin, allergic and psychiatric ROS reviewed and negative except for above    No Known Allergies    Outpatient Medications Marked as Taking for the 3/17/22 encounter (Office Visit) with Jenifer Smith MD   Medication Sig Dispense Refill    atorvastatin (LIPITOR) 40 MG tablet Take 1 tablet by mouth daily 90 tablet 0    DULoxetine (CYMBALTA) 60 MG extended release capsule TAKE 1 CAPSULE BY MOUTH ONE TIME A DAY AS NEEDED FOR PAIN 90 capsule 0    tiZANidine (ZANAFLEX) 4 MG tablet TAKE 1 TABLET BY MOUTH EVERY NIGHT AT BEDTIME FOR MUSCLE PAIN 90 tablet 1    estradiol (ESTRACE) 2 MG tablet Take 1 tablet by mouth daily 90 tablet 3    fluticasone (FLONASE) 50 MCG/ACT nasal spray 1 spray by Nasal route daily 1 Bottle 3    omeprazole (PRILOSEC OTC) 20 MG tablet Take 20 mg by mouth daily as needed.       cetirizine (ZYRTEC) 10 MG tablet Take 10 mg by mouth nightly                Past Medical History:   Diagnosis Date    Abnormal Neg Hx     Heart Failure Neg Hx     Hypertension Neg Hx     Migraines Neg Hx     Ovarian Cancer Neg Hx     Rashes/Skin Problems Neg Hx     Seizures Neg Hx            Objective     BP (!) 143/83   Pulse 80   Wt 246 lb 3.2 oz (111.7 kg)   SpO2 98%   BMI 41.61 kg/m²     @LASTSAO2(3)@    Wt Readings from Last 3 Encounters:   08/26/21 242 lb (109.8 kg)   07/15/21 246 lb (111.6 kg)   04/20/21 243 lb 12.8 oz (110.6 kg)       Physical Exam     NAD alert and cooperative  HEENT: Mildly crowded hypopharynx. TMs unremarkable no nystagmus. Good upstroke of the carotids full neck no bruits. Lungs are clear no wheezes rales or rhonchi. Cardiovascular exam 1/6 murmur left upper sternal border without gallop or click. Abdomen obese no point tenderness or intertriginous rash. Breasts are pendulous no intertriginous rash or mass  No pitting edema. Mild crepitus of the knees. Good sensation feet.   Good pulses  She is appropriate well-groomed good eye contact    Chemistry        Component Value Date/Time     03/03/2021 0809    K 3.5 03/03/2021 0809     03/03/2021 0809    CO2 25 03/03/2021 0809    BUN 10 03/03/2021 0809    CREATININE 0.9 03/03/2021 0809        Component Value Date/Time    CALCIUM 9.7 03/03/2021 0809    ALKPHOS 59 03/03/2021 0809    AST 14 (L) 03/03/2021 0809    ALT 12 03/03/2021 0809    BILITOT 0.5 03/03/2021 0809            Lab Results   Component Value Date    WBC 7.2 03/03/2021    HGB 13.4 03/03/2021    HCT 39.3 03/03/2021    MCV 88.1 03/03/2021     03/03/2021     Lab Results   Component Value Date    LABA1C 5.6 12/12/2019     Lab Results   Component Value Date    .0 06/14/2018     Lab Results   Component Value Date    LABA1C 5.6 12/12/2019     No components found for: CHLPL  Lab Results   Component Value Date    TRIG 90 08/12/2021    TRIG 84 08/06/2020    TRIG 119 06/05/2017     Lab Results   Component Value Date    HDL 60 08/12/2021    HDL 56 08/06/2020    HDL 52 06/26/2019     Lab Results   Component Value Date    LDLCALC 125 (H) 08/12/2021    LDLCALC 107 (H) 08/06/2020    [address] 06/26/2019     Lab Results   Component Value Date    LABVLDL 17 06/26/2019    LABVLDL 22 06/14/2018    LABVLDL 24 06/05/2017       Old labs and records reviewed or requested  Discussed past lab and studies with patient    Diagnosis Orders   1. BMI 40.0-44.9, adult ([address]     2. Encounter for screening mammogram for malignant neoplasm of breast  PAMELA DIGITAL SCREEN W OR WO CAD BILATERAL   3. Allergic rhinitis, unspecified seasonality, unspecified trigger     4. Pure hypercholesterolemia  Lipid Panel   5. Primary osteoarthritis involving multiple joints     6. S/P [place]-BSO     7. Single episode of elevated blood pressure  Comprehensive Metabolic Panel     Discussed weight loss weight loss clinic different diets she is aware of health issues associated with her weight. Do mammogram ordered. Rhinitis continue on with her over-the-counter's. Hyperlipidemia recheck lipid profile since changed to Lipitor. Osteoarthritis stable and persistent. Elevated blood pressure currently on steroids. She will check blood pressures when she is off these given information on DASH diet hypertension advised on 20 minutes of exercise daily  On this date I have spent 40 minutes reviewing previous notes, test results, and face to face with the patient discussing the diagnosis and plan          No follow-ups on file. Diagnosis and treatment discussed.   Possible side effects of medication reviewed  Patients questions answered  Follow up understood  Pt aware if they are not contacted about any test results , this does not mean they are normal.  They should call

## 2022-04-14 ENCOUNTER — TELEPHONE (OUTPATIENT)
Dept: FAMILY MEDICINE CLINIC | Age: 57
End: 2022-04-14

## 2022-04-14 DIAGNOSIS — E78.00 PURE HYPERCHOLESTEROLEMIA: Primary | ICD-10-CM

## 2022-04-14 DIAGNOSIS — R03.0 ELEVATED BLOOD PRESSURE READING: ICD-10-CM

## 2022-04-14 DIAGNOSIS — E78.00 PURE HYPERCHOLESTEROLEMIA: ICD-10-CM

## 2022-04-14 LAB
A/G RATIO: 2.2 (ref 1.1–2.2)
ALBUMIN SERPL-MCNC: 4.2 G/DL (ref 3.4–5)
ALP BLD-CCNC: 66 U/L (ref 40–129)
ALT SERPL-CCNC: 19 U/L (ref 10–40)
ANION GAP SERPL CALCULATED.3IONS-SCNC: 13 MMOL/L (ref 3–16)
AST SERPL-CCNC: 19 U/L (ref 15–37)
BILIRUB SERPL-MCNC: 0.3 MG/DL (ref 0–1)
BUN BLDV-MCNC: 15 MG/DL (ref 7–20)
CALCIUM SERPL-MCNC: 9.3 MG/DL (ref 8.3–10.6)
CHLORIDE BLD-SCNC: 103 MMOL/L (ref 99–110)
CHOLESTEROL, TOTAL: 170 MG/DL (ref 0–199)
CO2: 25 MMOL/L (ref 21–32)
CREAT SERPL-MCNC: 0.7 MG/DL (ref 0.6–1.1)
GFR AFRICAN AMERICAN: >60
GFR NON-AFRICAN AMERICAN: >60
GLUCOSE BLD-MCNC: 85 MG/DL (ref 70–99)
HDLC SERPL-MCNC: 52 MG/DL (ref 40–60)
LDL CHOLESTEROL CALCULATED: 94 MG/DL
POTASSIUM SERPL-SCNC: 4.3 MMOL/L (ref 3.5–5.1)
SODIUM BLD-SCNC: 141 MMOL/L (ref 136–145)
TOTAL PROTEIN: 6.1 G/DL (ref 6.4–8.2)
TRIGL SERPL-MCNC: 121 MG/DL (ref 0–150)
VLDLC SERPL CALC-MCNC: 24 MG/DL

## 2022-04-14 NOTE — TELEPHONE ENCOUNTER
Patient is at the lab for a lipid and cmp. Orders 3/17/22 were cancelled. I have re ordered for lab collect.

## 2022-04-18 RX ORDER — ATORVASTATIN CALCIUM 40 MG/1
40 TABLET, FILM COATED ORAL DAILY
Qty: 90 TABLET | Refills: 3 | Status: SHIPPED | OUTPATIENT
Start: 2022-04-18

## 2022-05-03 PROBLEM — K64.4 EXTERNAL HEMORRHOID: Status: ACTIVE | Noted: 2022-05-03

## 2022-05-09 RX ORDER — DULOXETIN HYDROCHLORIDE 60 MG/1
CAPSULE, DELAYED RELEASE ORAL
Qty: 90 CAPSULE | Refills: 0 | Status: SHIPPED | OUTPATIENT
Start: 2022-05-09 | End: 2022-08-29

## 2022-05-09 RX ORDER — ESTRADIOL 2 MG/1
TABLET ORAL
Qty: 90 TABLET | Refills: 1 | Status: SHIPPED | OUTPATIENT
Start: 2022-05-09 | End: 2022-10-19 | Stop reason: SDUPTHER

## 2022-07-21 ENCOUNTER — OFFICE VISIT (OUTPATIENT)
Dept: FAMILY MEDICINE CLINIC | Age: 57
End: 2022-07-21
Payer: COMMERCIAL

## 2022-07-21 VITALS
WEIGHT: 248 LBS | BODY MASS INDEX: 41.32 KG/M2 | HEIGHT: 65 IN | SYSTOLIC BLOOD PRESSURE: 144 MMHG | DIASTOLIC BLOOD PRESSURE: 91 MMHG | OXYGEN SATURATION: 100 % | RESPIRATION RATE: 12 BRPM | HEART RATE: 71 BPM

## 2022-07-21 DIAGNOSIS — M15.9 PRIMARY OSTEOARTHRITIS INVOLVING MULTIPLE JOINTS: ICD-10-CM

## 2022-07-21 PROCEDURE — 99214 OFFICE O/P EST MOD 30 MIN: CPT | Performed by: INTERNAL MEDICINE

## 2022-07-21 SDOH — ECONOMIC STABILITY: FOOD INSECURITY: WITHIN THE PAST 12 MONTHS, YOU WORRIED THAT YOUR FOOD WOULD RUN OUT BEFORE YOU GOT MONEY TO BUY MORE.: NEVER TRUE

## 2022-07-21 SDOH — ECONOMIC STABILITY: FOOD INSECURITY: WITHIN THE PAST 12 MONTHS, THE FOOD YOU BOUGHT JUST DIDN'T LAST AND YOU DIDN'T HAVE MONEY TO GET MORE.: NEVER TRUE

## 2022-07-21 ASSESSMENT — PATIENT HEALTH QUESTIONNAIRE - PHQ9
SUM OF ALL RESPONSES TO PHQ9 QUESTIONS 1 & 2: 0
1. LITTLE INTEREST OR PLEASURE IN DOING THINGS: 0
SUM OF ALL RESPONSES TO PHQ QUESTIONS 1-9: 0
2. FEELING DOWN, DEPRESSED OR HOPELESS: 0

## 2022-07-21 ASSESSMENT — SOCIAL DETERMINANTS OF HEALTH (SDOH): HOW HARD IS IT FOR YOU TO PAY FOR THE VERY BASICS LIKE FOOD, HOUSING, MEDICAL CARE, AND HEATING?: NOT HARD AT ALL

## 2022-07-21 NOTE — PATIENT INSTRUCTIONS
Covid boosters  Weight watchers. Consider medication  For your mammogram appointment today. 20 minutes or more of exercise daily. Try hot wax bath.   If you want to have a screen for rheumatoid arthritis I can put one in however do not feel like this is what you have

## 2022-07-21 NOTE — PROGRESS NOTES
HPI: Tamiko Oconnor presents for arthritis and weight    Chronic health issues include osteoarthritis, recurrent vertigo, rhinitis, familial heart disease, total hysterectomy BSO for benign reasons, BPPV     BPPV. Recurrent. Flares 4 times yearly. Was given Epley maneuver exercises. Rhinitis on Flonase and saline irrigations per ENT. Able to manage symptoms    Concern with weight.  + snacking at night.+ beach body. Hx weight watchers in the past.  Borderline blood pressures. Knee pain. No known sleep apnea however did have a parasomnia episode once. Positive GE reflux doing well. Hyperlipidemia. .  Currently on Cymbalta 60    Finger pain. Voltarin gel not helpful. Advil 600 mg bid. Has seen orthopedist.  Not interested in narcotic. No known inflammatory arthritis, colitis or urethritis. No psoriasis. Notes that things improve when she has her epidural steroid injections aware of dangers associated with chronic steroid use. History OA, back pain,  facet injection 10.2020. Rare zanafex, daily cymbalta, c,  MRI 10/2020 with stenosis foraminal narrowing levo sclerosis and loss of disc multiple areas. PT.   Stretching. No bowel or bladder incontinence. BMI 40. Denies apnea. Rare brief knee pain or rare GE reflux prn prilosec. No somnolence. decreased exercise. Bra size 38 D. Some pain in shoulders digs in her arms no intertriginous rash able to exercise. Family history sleep apnea      BPPV. Recurrent. Flares 4 times yearly. Was given Epley maneuver exercises. Rhinitis on Flonase and saline irrigations per ENT. Able to manage symptoms        Strong family history of myocardial infarction. Denies any  new exercise intolerance. lipitor 40 mg daily  Aspirin daily. . Echocardiogram  EF 66%. Trivial mitral regurgitation. Trivial pulmonic regurgitation. No chest pain or increased exercise intolerance Occasional palpitations when she drinks coffee       .  no family history of breast cancer. Time for mammogram.. Is on estrogen by gyn . No std concerns. .  Total hysterectomy bilateral BSO for nonmalignant reasons. improved hot flashes. No history of clots. colonoscopy Dr. Mendel Gunter 8. follow up  + hemorrhoidal banding doing well. PMH:  , tubal ligation, shoulder surgery, knee arthroscopy, tendon repair, robotic hysterectomy and bilateral oophorectomy benign reasons        SH:  ( 3 outside). Pre post cath lab. 2 dog dog and cats. Likes her job. . No tobacco.  Rare alcohol. No drugs, anytime . No STD concerns. No domestic violence. Wears sunscreen. Goes to the gym 60 minutes 3 times weekly. Daughter in law with traumatic birth herbs palsey and preeclampsia and transfusion. Doing well now. .     FH    + HTN, prediabetic and morbid obesity, drowning, ALS father,  sleep apnea and mother,maternal aunt ovarian cancer     -colon, depression, PGM depression requiring triple therapy     Review of systems: Up-to-date eye exam.  Bifocals. Has a dentist.  No concussions or seizures. Positive rhinitis BPPV. Spinning. No pneumonias abnormal chest x-rays. .  Remote bronchitis. No new exercise intolerance syncope or falls. No breast concerns. Has exams. Rare GE reflux. Osteoarthritis. No diarrhea or constipation. Recheck Cologuard . Remotely abnormal Pap smear total hysterectomy oophorectomy for benign reasons.     Orthopedist.         Constitutional, ent, CV, respiratory, GI, , joint, skin, allergic and psychiatric ROS reviewed and negative except for above    No Known Allergies    Outpatient Medications Marked as Taking for the 22 encounter (Office Visit) with Denis Luis MD   Medication Sig Dispense Refill    estradiol (ESTRACE) 2 MG tablet TAKE 1 TABLET BY MOUTH ONE TIME A DAY 90 tablet 1    DULoxetine (CYMBALTA) 60 MG extended release capsule TAKE 1 CAPSULE BY MOUTH ONE TIME A DAY AS NEEDED FOR PAIN 90 capsule 0 atorvastatin (LIPITOR) 40 MG tablet Take 1 tablet by mouth daily 90 tablet 3    tiZANidine (ZANAFLEX) 4 MG tablet TAKE 1 TABLET BY MOUTH EVERY NIGHT AT BEDTIME FOR MUSCLE PAIN 90 tablet 1    Simethicone 80 MG TABS Take 80 mg by mouth every 6 hours as needed (gas pain) 40 each 1    EVENING PRIMROSE OIL PO Take 1,000 mg by mouth 2 times daily      magnesium oxide (MAG-OX) 400 MG tablet Take 400 mg by mouth daily      fluticasone (FLONASE) 50 MCG/ACT nasal spray 1 spray by Nasal route daily 1 Bottle 3    omeprazole (PRILOSEC OTC) 20 MG tablet Take 20 mg by mouth daily as needed. cetirizine (ZYRTEC) 10 MG tablet Take 10 mg by mouth nightly                Past Medical History:   Diagnosis Date    Abnormal perimenopausal bleeding 10/20/2020    10.2020 Pap, calcium, exercise, mammogram, hemocult negative 2 and 3. PMB-stripe 7 mm which is OK on HRT. Plans for endo bx. Attempted and was unsuccessful. Will stop HRT-check labs and repeat pelvic US. Acute thoracic back pain 1/6/2021    Allergic rhinitis     BMI 40.0-44.9, adult ([facility] [address] 5/15/2017    2017 a1c 5.3, knee  Pain, hyperlipidemia    Elevated blood pressure reading 9/3/2020    Hyperlipidemia     Medial meniscus tear     PONV (postoperative nausea and vomiting)     Scop patch worked well    Primary osteoarthritis involving multiple joints 6/26/2019    S/P [place]-BSO 10/20/2020    10.2020 Pap, calcium, exercise, mammogram, hemocult negative 2 and 3. PMB-stripe 7 mm which is OK on HRT. Plans for endo bx. Attempted and was unsuccessful. Will stop HRT-check labs and repeat pelvic US.   Impending hyst 3.2021                  Wears glasses        Past Surgical History:   Procedure Laterality Date    HYSTERECTOMY Bilateral 3/3/2021    ROBOTIC ASSISTED HYSTERECTOMY, BILATERAL SALPINGO-OOPHORECTOMY performed by Ricco Arreola MD at [facility] ARTHROSCOPY Left 05/31/2017    OTHER SURGICAL HISTORY Right 04/22/2015    Secondary repair of peroneus brevis tendon tear R foot    SHOULDER SURGERY Left 05/05/2017    labrum repair    TUBAL LIGATION  1993             Family History   Problem Relation Age of Onset    High Blood Pressure Mother     Arthritis Mother     Heart Disease Father     High Blood Pressure Brother     Diabetes Brother     Heart Attack Brother     Heart Disease Maternal Grandmother     Stroke Paternal Grandmother     Heart Disease Paternal Grandfather     High Blood Pressure Brother     Depression Sister     Thyroid Disease Sister     High Cholesterol Sister     Depression Sister     High Cholesterol Sister     Rheum Arthritis Neg Hx     Osteoarthritis Neg Hx     Asthma Neg Hx     Breast Cancer Neg Hx     Cancer Neg Hx     Heart Failure Neg Hx     Hypertension Neg Hx     Migraines Neg Hx     Ovarian Cancer Neg Hx     Rashes/Skin Problems Neg Hx     Seizures Neg Hx                    Objective     BP (!) 144/91   Pulse 71   Resp 12   Ht 5' 4.5\" (1.638 m)   Wt 248 lb (112.5 kg)   SpO2 100%   BMI 41.91 kg/m²     @LASTSAO2(3)@    Wt Readings from Last 3 Encounters:   03/17/22 246 lb 3.2 oz (111.7 kg)   08/26/21 242 lb (109.8 kg)   07/15/21 246 lb (111.6 kg)       Physical Exam     NAD alert and cooperative  HEENT: Crowded hypopharynx. TMs unremarkable. Pink conjunctive a. No icterus. Good upstroke of the carotid. Full neck. Lungs are clear no wheezes rales or rhonchi. Cardiovascular exam regular rate and rhythm without any murmur click. Abdomen is about benign no hepatosplenomegaly epigastric tenderness or mass. Minimal crepitus of the knees. Swollen ring finger PIP pain base of the thumb. Curvature DIP fifth finger right hand. No suspicious skin lesions or nodules.   She is dynamic, good eye contact well-groomed    Chemistry        Component Value Date/Time     04/14/2022 1136    K 4.3 04/14/2022 1136    K 3.5 03/03/2021 0809     04/14/2022 1136    CO2 25 04/14/2022 1136    BUN 15 04/14/2022 1136    CREATININE 0.7 04/14/2022 1136        Component Value Date/Time    CALCIUM 9.3 04/14/2022 1136    ALKPHOS 66 04/14/2022 1136    AST 19 04/14/2022 1136    ALT 19 04/14/2022 1136    BILITOT 0.3 04/14/2022 1136            Lab Results   Component Value Date    WBC 7.2 03/03/2021    HGB 13.4 03/03/2021    HCT 39.3 03/03/2021    MCV 88.1 03/03/2021     03/03/2021     Lab Results   Component Value Date    LABA1C 5.6 12/12/2019     Lab Results   Component Value Date    .0 06/14/2018     Lab Results   Component Value Date    LABA1C 5.6 12/12/2019     No components found for: CHLPL  Lab Results   Component Value Date    TRIG 121 04/14/2022    TRIG 90 08/12/2021    TRIG 84 08/06/2020     Lab Results   Component Value Date    HDL 52 04/14/2022    HDL 60 08/12/2021    HDL 56 08/06/2020     Lab Results   Component Value Date    LDLCALC 94 04/14/2022    LDLCALC 125 (H) 08/12/2021    LDLCALC 107 (H) 08/06/2020     Lab Results   Component Value Date    LABVLDL 24 04/14/2022    LABVLDL 17 06/26/2019    LABVLDL 22 06/14/2018       Old labs and records reviewed or requested  Discussed past lab and studies with patient      Diagnosis Orders   1. BMI 40.0-44.9, adult ([facility] [address]        2. Primary osteoarthritis involving multiple joints          Evaded BMI weight watchers. Discussed possible weight like medications and clinics. She will let me know what she decides. Status post banding of hemorrhoid doing well. Osteoarthritis multiple joints Tylenol minimal Advil use PPI if using this. Hot with baths and physical therapy. We did not discuss allergic rhinitis, hyperlipidemia, or low back pain pacifically today    On this date I have spent 40 minutes reviewing previous notes, test results, and face to face with the patient discussing the diagnosis and plan          No follow-ups on file. Diagnosis and treatment discussed.   Possible side effects of medication reviewed  Patients questions answered  Follow up understood  Pt aware if they are not contacted about any test results , this does not mean they are normal.  They should call

## 2022-07-22 ENCOUNTER — PATIENT MESSAGE (OUTPATIENT)
Dept: FAMILY MEDICINE CLINIC | Age: 57
End: 2022-07-22

## 2022-07-26 RX ORDER — SEMAGLUTIDE 1.34 MG/ML
INJECTION, SOLUTION SUBCUTANEOUS
Qty: 3 ML | Refills: 0 | Status: SHIPPED | OUTPATIENT
Start: 2022-07-26 | End: 2022-09-29

## 2022-07-26 RX ORDER — PEN NEEDLE, DIABETIC 30 GX5/16"
1 NEEDLE, DISPOSABLE MISCELLANEOUS DAILY
Qty: 8 EACH | Refills: 0 | Status: SHIPPED | OUTPATIENT
Start: 2022-07-26

## 2022-08-28 NOTE — LETTER
[address], [address] [address]  Phone: [phone]  Fax: [phone]    Alena Murillo MD        August 29, 2022    [place] [address]      Dear Srinivasan Werner:    Kishor Richey are due for a follow up in November. Please call to establish with new pcp.     MD Ronny Bah MD  [facility] internal Medicine  [address] [place] [address]  [address]  [address] [place] [address]  [phone]  If you have any questions or concerns, please don't hesitate to call.     Sincerely,        Alena Murillo MD

## 2022-08-29 LAB
CHOLESTEROL, TOTAL: 198 MG/DL (ref 0–199)
GLUCOSE BLD-MCNC: 80 MG/DL (ref 70–99)
HDLC SERPL-MCNC: 49 MG/DL (ref 40–60)
LDL CHOLESTEROL CALCULATED: 121 MG/DL
TRIGL SERPL-MCNC: 138 MG/DL (ref 0–150)

## 2022-08-29 RX ORDER — DULOXETIN HYDROCHLORIDE 60 MG/1
CAPSULE, DELAYED RELEASE ORAL
Qty: 90 CAPSULE | Refills: 0 | Status: SHIPPED | OUTPATIENT
Start: 2022-08-29

## 2022-08-29 NOTE — TELEPHONE ENCOUNTER
Pt due follow up in nov.  Have her call to establish with new pcp    MD Michelle Oropeza MD  [facility] internal Medicine  [address] [facility] [address]  [address]  [phone]

## 2022-09-29 ENCOUNTER — TELEMEDICINE (OUTPATIENT)
Dept: PRIMARY CARE CLINIC | Age: 57
End: 2022-09-29
Payer: COMMERCIAL

## 2022-09-29 ENCOUNTER — PATIENT MESSAGE (OUTPATIENT)
Dept: FAMILY MEDICINE CLINIC | Age: 57
End: 2022-09-29

## 2022-09-29 DIAGNOSIS — R51.9 HEADACHE DUE TO VIRAL INFECTION: ICD-10-CM

## 2022-09-29 DIAGNOSIS — R05.9 COUGH: Primary | ICD-10-CM

## 2022-09-29 DIAGNOSIS — R11.0 NAUSEA: ICD-10-CM

## 2022-09-29 DIAGNOSIS — R09.81 NASAL CONGESTION: ICD-10-CM

## 2022-09-29 DIAGNOSIS — B34.9 HEADACHE DUE TO VIRAL INFECTION: ICD-10-CM

## 2022-09-29 DIAGNOSIS — J02.9 SORE THROAT: ICD-10-CM

## 2022-09-29 DIAGNOSIS — R50.9 LOW GRADE FEVER: ICD-10-CM

## 2022-09-29 DIAGNOSIS — U07.1 COVID-19: ICD-10-CM

## 2022-09-29 PROCEDURE — 99213 OFFICE O/P EST LOW 20 MIN: CPT | Performed by: NURSE PRACTITIONER

## 2022-09-29 RX ORDER — BENZONATATE 200 MG/1
200 CAPSULE ORAL 3 TIMES DAILY PRN
Qty: 30 CAPSULE | Refills: 0 | Status: SHIPPED | OUTPATIENT
Start: 2022-09-29 | End: 2022-10-09

## 2022-09-29 RX ORDER — DEXTROMETHORPHAN HYDROBROMIDE AND PROMETHAZINE HYDROCHLORIDE 15; 6.25 MG/5ML; MG/5ML
5 SYRUP ORAL 4 TIMES DAILY PRN
Qty: 120 ML | Refills: 0 | Status: SHIPPED | OUTPATIENT
Start: 2022-09-29

## 2022-09-29 RX ORDER — SEMAGLUTIDE 1.34 MG/ML
INJECTION, SOLUTION SUBCUTANEOUS
Qty: 3 ML | Refills: 0 | Status: SHIPPED | OUTPATIENT
Start: 2022-09-29

## 2022-09-29 ASSESSMENT — ENCOUNTER SYMPTOMS
NAUSEA: 1
COUGH: 1
SORE THROAT: 1

## 2022-09-29 NOTE — PROGRESS NOTES
Gabe Obregon (:  1965) is a Established patient, here for evaluation of the following:Nonproductive cough, headaches, body aches/chills, nasal congestion, scratchy throat, low-grade fever of 99.9, and nausea symptoms started on Tuesday Did an at home COVID-19 test last night positive    Assessment & Plan   Below is the assessment and plan developed based on review of pertinent history, physical exam, labs, studies, and medications. 1. Cough  Problem  -     benzonatate (TESSALON) 200 MG capsule; Take 1 capsule by mouth 3 times daily as needed for Cough, Disp-30 capsule, R-0Normal  -     promethazine-dextromethorphan (PROMETHAZINE-DM) 6.25-15 MG/5ML syrup; Take 5 mLs by mouth 4 times daily as needed for Cough, Disp-120 mL, R-0Normal  See patient instructions    2. Headache due to viral infection  Problem  See patient instructions  Alternate between ibuprofen and Tylenol as the bottles direct    3. Nasal congestion  Problem  Continue with Flonase and was also instructed to get sodium chloride nasal spray over-the-counter and use this as the bottles direct    4. Sore throat  Problem  See patient instructions    5. Low grade fever  Problem  See patient instructions    6. Nausea  Problem  See patient instructions  Patient states she has Phenergan that was given for previous issue and states she will take this as the bottle directs for the nausea    7. COVID-19  Problem  Note given to return back to work on 2020 this will be her 5-day quarantine  Instructions for COVID-19  Currently, the CDC recommends staying at home in self isolation for at least 5 days. After that, if you are without a fever and symptoms are improving, you may go out, but only if wearing a mask for an additional 5 days. The mainstay of treatment for most people remains focused on symptom control, isolating and watching for signs of worsening illness.  You should drink plenty of fluids, eat when you are able, and rest as much as possible. Recommend treating symptoms with OTC medications: Flonase for congestions, Mucinex for Phlegm, Robitussin DM or Delsym for cough, Tylenol/Ibuprofen for fever/body aches. Recommend Vitamin D, C and Zinc. These are all over the counter     We do not prescribe antibiotics for viral illnesses; however, we can treat secondary infections should the need arise. Please seek more urgent medical attention if you develop any of the following:  Chest pain  Shortness of breath  Bluish lips or face  Severe headache   Severe dizziness or passing out  New confusion  Inability to stay awake  Extreme weakness  If you have a pulse oximeter, check it at least daily. Seek urgent medical attention if it drops to 92% or below. Follow up with PCP if symptoms do not improve or if they worsen      Subjective   This is a very pleasant female 53-year-old patient of Dr. Tommy Grimm consenting to a virtual visit today  She has complaints of a nonproductive cough, headaches, body aches/chills, nasal congestion, scratchy throat, low-grade fever of 99.9, and nausea since Tuesday. 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 She has been taking ibuprofen 600 mg alternating with over-the-counter Tylenol for the headaches body aches. We did discuss oral antiviral medication after explaining the risks and the benefits of the medication and telling her she would have to discontinue the a tour of a statin for the 5 days while on the medication and 3 days after she opted to not take the medication and to just treat her symptoms today. Review of Systems   Constitutional:  Positive for chills and fever. HENT:  Positive for congestion and sore throat. Respiratory:  Positive for cough. Gastrointestinal:  Positive for nausea. Neurological:  Positive for headaches. All other systems reviewed and are negative.       Objective   Patient-Reported Vitals  Patient-Reported Temperature: 99.9 with medication  Patient-Reported Weight: 237 pounds     Physical Exam  [INSTRUCTIONS:  \"[x]\" Indicates a positive item  \"[]\" Indicates a negative item  -- DELETE ALL ITEMS NOT EXAMINED]    Constitutional: [x] Appears well-developed and well-nourished [x] No apparent distress      [x] Abnormal - ill appearing    Mental status: [x] Alert and awake  [x] Oriented to person/place/time [x] Able to follow commands    [] Abnormal -     Eyes:   EOM    [x]  Normal    [] Abnormal -   Sclera  [x]  Normal    [] Abnormal -          Discharge [x]  None visible   [] Abnormal -     HENT: [x] Normocephalic, atraumatic  [] Abnormal -   [] Mouth/Throat: Mucous membranes are moist    External Ears [] Normal  [] Abnormal -    Neck: [x] No visualized mass [] Abnormal -     Pulmonary/Chest: [x] Respiratory effort normal   [x] No visualized signs of difficulty breathing or respiratory distress        [] Abnormal -      Musculoskeletal:   [] Normal gait with no signs of ataxia         [x] Normal range of motion of neck        [] Abnormal -     Neurological:        [x] No Facial Asymmetry (Cranial nerve 7 motor function) (limited exam due to video visit)          [] No gaze palsy        [] Abnormal -          Skin:        [x] No significant exanthematous lesions or discoloration noted on facial skin         [] Abnormal -            Psychiatric:       [x] Normal Affect [] Abnormal -           On this date 9/29/2022 I have spent 25 minutes reviewing previous notes, test results and face to face (virtual) with the patient discussing the diagnosis and importance of compliance with the treatment plan as well as documenting on the day of the visit. Brooklyn Borja, was evaluated through a synchronous (real-time) audio-video encounter. The patient (or guardian if applicable) is aware that this is a billable service, which includes applicable co-pays.  This Virtual Visit was conducted with patient's (and/or legal guardian's) consent. The visit was conducted pursuant to the emergency declaration under the [facility], [address] [place] [address] authority and the [employer] and [employer] General Act. Patient identification was verified, and a caregiver was present when appropriate. The patient was located at Home: [address] [facility]  [address] [place] [address].    Provider was located at [facility] ([facility] Julito [id number] GARETT Persaud CNP

## 2022-09-29 NOTE — TELEPHONE ENCOUNTER
From: Jeanette Block  To: Dr. King Vandana: 9/29/2022 8:23 AM EDT  Subject: Griselda Belts. I wanted to touch base. Im down to 237 as today I do have a new visit with the new PCP on November 10. I do need a new prescription of Ozempic to get me through to November. Im on 0.5 mg now. I think I have 2 more injections left. Thank you so much for taking great care of me. I am going to miss you.  Its hard to go to another physician , when I have so much trust in you    Michaelle

## 2022-09-29 NOTE — LETTER
[address]  [facility] [address]  Phone: [phone]  Fax: [phone]    GARETT Brito CNP        September 29, 2022     Patient: Danielle Reis   YOB: 1965   Date of Visit: 9/29/2022       To Whom it May Concern:    Susan Owens was seen in my clinic on 9/29/2022. She may return to work on October 4 2022. If you have any questions or concerns, please don't hesitate to call.     Sincerely,         Britta Persaud, GARETT - CNP

## 2022-10-19 ENCOUNTER — OFFICE VISIT (OUTPATIENT)
Dept: GYNECOLOGY | Age: 57
End: 2022-10-19
Payer: COMMERCIAL

## 2022-10-19 VITALS
SYSTOLIC BLOOD PRESSURE: 138 MMHG | RESPIRATION RATE: 17 BRPM | BODY MASS INDEX: 40.46 KG/M2 | WEIGHT: 239.4 LBS | DIASTOLIC BLOOD PRESSURE: 78 MMHG | OXYGEN SATURATION: 98 % | HEART RATE: 74 BPM

## 2022-10-19 DIAGNOSIS — Z01.419 WELL WOMAN EXAM WITH ROUTINE GYNECOLOGICAL EXAM: Primary | ICD-10-CM

## 2022-10-19 DIAGNOSIS — Z78.0 MENOPAUSE: ICD-10-CM

## 2022-10-19 DIAGNOSIS — R23.2 HOT FLASHES: ICD-10-CM

## 2022-10-19 LAB — ESTRADIOL LEVEL: 166 PG/ML

## 2022-10-19 PROCEDURE — 99396 PREV VISIT EST AGE 40-64: CPT | Performed by: OBSTETRICS & GYNECOLOGY

## 2022-10-19 RX ORDER — ESTRADIOL 2 MG/1
2 TABLET ORAL DAILY
Qty: 90 TABLET | Refills: 1 | Status: SHIPPED | OUTPATIENT
Start: 2022-10-19

## 2022-10-19 RX ORDER — SEMAGLUTIDE 1.34 MG/ML
4 INJECTION, SOLUTION SUBCUTANEOUS WEEKLY
COMMUNITY
Start: 2022-09-29

## 2022-10-19 ASSESSMENT — ENCOUNTER SYMPTOMS
EYES NEGATIVE: 1
ALLERGIC/IMMUNOLOGIC NEGATIVE: 1
RESPIRATORY NEGATIVE: 1
GASTROINTESTINAL NEGATIVE: 1

## 2022-10-19 NOTE — PROGRESS NOTES
Subjective:      Patient ID: Timothy Manning is a 59 y.o. female. Patient is here for annual. Patient in menopause-still having some hot flashes. Gynecologic Exam      Review of Systems   Constitutional: Negative. HENT: Negative. Eyes: Negative. Respiratory: Negative. Cardiovascular: Negative. Gastrointestinal: Negative. Endocrine: Negative. Genitourinary: Negative. Musculoskeletal: Negative. Skin: Negative. Allergic/Immunologic: Negative. Neurological: Negative. Hematological: Negative. Psychiatric/Behavioral: Negative. Date of Birth 1965  Past Medical History:   Diagnosis Date    Abnormal perimenopausal bleeding 10/20/2020    10.2020 Pap, calcium, exercise, mammogram, hemocult negative 2 and 3. PMB-stripe 7 mm which is OK on HRT. Plans for endo bx. Attempted and was unsuccessful. Will stop HRT-check labs and repeat pelvic US. Acute thoracic back pain 2021    Allergic rhinitis     BMI 40.0-44.9, adult ([address] 5/15/2017    2017 a1c 5.3, knee  Pain, hyperlipidemia    Elevated blood pressure reading 9/3/2020    Hyperlipidemia     Medial meniscus tear     PONV (postoperative nausea and vomiting)     Scop patch worked well    Primary osteoarthritis involving multiple joints 2019    S/P [place]-BSO 10/20/2020    10.2020 Pap, calcium, exercise, mammogram, hemocult negative 2 and 3. PMB-stripe 7 mm which is OK on HRT. Plans for endo bx. Attempted and was unsuccessful. Will stop HRT-check labs and repeat pelvic US.   Impending hyst 3.2021                  Wears glasses      Past Surgical History:   Procedure Laterality Date    HYSTERECTOMY (CERVIX STATUS UNKNOWN) Bilateral 3/3/2021    ROBOTIC ASSISTED HYSTERECTOMY, BILATERAL SALPINGO-OOPHORECTOMY performed by Mamie Joy MD at [address] [place] [address] ARTHROSCOPY Left 2017    OTHER SURGICAL HISTORY Right 2015    Secondary repair of peroneus brevis tendon tear R foot    SHOULDER SURGERY Left 2017    labrum repair    TUBAL LIGATION       OB History    Para Term  AB Living   3 3 3     3   SAB IAB Ectopic Molar Multiple Live Births             3      # Outcome Date GA Lbr Jimmie/2nd Weight Sex Delivery Anes PTL Lv   3 Term 5 39w0d   F Vag-Spont   WILL   2 Term 12 37w0d   F Vag-Spont   WILL   1 Term 18 41w0d   M Vag-Spont   WILL     Social History     Socioeconomic History    Marital status:      Spouse name: Not on file    Number of children: 3    Years of education: Not on file    Highest education level: Not on file   Occupational History    Occupation: RN   Tobacco Use    Smoking status: Never    Smokeless tobacco: Never   Vaping Use    Vaping Use: Never used   Substance and Sexual Activity    Alcohol use: No     Alcohol/week: 0.0 standard drinks     Comment: rarely    Drug use: Never    Sexual activity: Yes     Partners: Male   Other Topics Concern    Not on file   Social History Narrative    Not on file     Social Determinants of Health     Financial Resource Strain: Low Risk     Difficulty of Paying Living Expenses: Not hard at all   Food Insecurity: No Food Insecurity    Worried About Running Out of Food in the Last Year: Never true    Ran Out of Food in the Last Year: Never true   Transportation Needs: Not on file   Physical Activity: Not on file   Stress: Not on file   Social Connections: Not on file   Intimate Partner Violence: Not on file   Housing Stability: Not on file     No Known Allergies  Outpatient Medications Marked as Taking for the 10/19/22 encounter (Office Visit) with Sarita Rodríguez MD   Medication Sig Dispense Refill    Semaglutide,0.25 or 0.5MG/DOS, (OZEMPIC, 0.25 OR 0.5 MG/DOSE,) 2 MG/1.5ML SOPN 1 mg sc weekly 3 mL 0    promethazine-dextromethorphan (PROMETHAZINE-DM) 6.25-15 MG/5ML syrup Take 5 mLs by mouth 4 times daily as needed for Cough 120 mL 0    DULoxetine (CYMBALTA) 60 MG extended release capsule TAKE 1 CAPSULE BY MOUTH ONE TIME A DAY AS NEEDED FOR PAIN 90 capsule 0    Insulin Pen Needle (PEN NEEDLES 3/16\") 31G X 5 MM MISC 1 each by Does not apply route daily 8 each 0    estradiol (ESTRACE) 2 MG tablet TAKE 1 TABLET BY MOUTH ONE TIME A DAY 90 tablet 1    atorvastatin (LIPITOR) 40 MG tablet Take 1 tablet by mouth daily 90 tablet 3    tiZANidine (ZANAFLEX) 4 MG tablet TAKE 1 TABLET BY MOUTH EVERY NIGHT AT BEDTIME FOR MUSCLE PAIN 90 tablet 1    magnesium oxide (MAG-OX) 400 MG tablet Take 400 mg by mouth daily      fluticasone (FLONASE) 50 MCG/ACT nasal spray 1 spray by Nasal route daily 1 Bottle 3    omeprazole (PRILOSEC OTC) 20 MG tablet Take 20 mg by mouth daily as needed. cetirizine (ZYRTEC) 10 MG tablet Take 10 mg by mouth nightly        Family History   Problem Relation Age of Onset    High Blood Pressure Mother     Arthritis Mother     Heart Disease Father     High Blood Pressure Brother     Diabetes Brother     Heart Attack Brother     Heart Disease Maternal Grandmother     Stroke Paternal Grandmother     Heart Disease Paternal Grandfather     High Blood Pressure Brother     Depression Sister     Thyroid Disease Sister     High Cholesterol Sister     Depression Sister     High Cholesterol Sister     Rheum Arthritis Neg Hx     Osteoarthritis Neg Hx     Asthma Neg Hx     Breast Cancer Neg Hx     Cancer Neg Hx     Heart Failure Neg Hx     Hypertension Neg Hx     Migraines Neg Hx     Ovarian Cancer Neg Hx     Rashes/Skin Problems Neg Hx     Seizures Neg Hx      /78 (Site: Right Lower Arm, Position: Sitting, Cuff Size: Large Adult)   Pulse 74   Resp 17   Wt 239 lb 6.4 oz (108.6 kg)   LMP 09/07/2020   SpO2 98%   BMI 40.46 kg/m²       Objective:   Physical Exam  Constitutional:       General: She is not in acute distress. Appearance: Normal appearance. She is well-developed and normal weight. She is not diaphoretic. HENT:      Head: Normocephalic.       Nose: Nose normal.      Mouth/Throat:      Mouth: Mucous membranes are moist.      Pharynx: Oropharynx is clear. Eyes:      Extraocular Movements: Extraocular movements intact. Neck:      Thyroid: No thyromegaly. Cardiovascular:      Rate and Rhythm: Normal rate and regular rhythm. Heart sounds: Normal heart sounds. No murmur heard. No friction rub. No gallop. Pulmonary:      Effort: Pulmonary effort is normal. No respiratory distress. Breath sounds: Normal breath sounds. No wheezing or rales. Chest:      Chest wall: No tenderness. Breasts:     Right: No swelling, bleeding, inverted nipple, mass, nipple discharge, skin change or tenderness. Left: No swelling, bleeding, inverted nipple, mass, nipple discharge, skin change or tenderness. Abdominal:      General: Abdomen is flat. There is no distension. Palpations: Abdomen is soft. There is no hepatomegaly or mass. Tenderness: There is no abdominal tenderness. There is no guarding or rebound. Hernia: No hernia is present. There is no hernia in the left inguinal area. Genitourinary:     Exam position: Lithotomy position. Labia:         Right: No rash, tenderness, lesion or injury. Left: No rash, tenderness, lesion or injury. Urethra: No prolapse, urethral pain, urethral swelling or urethral lesion. Vagina: Normal. No signs of injury and foreign body. No vaginal discharge, erythema, tenderness, bleeding or lesions. Adnexa: Right adnexa normal and left adnexa normal.        Right: No mass, tenderness or fullness. Left: No mass, tenderness or fullness. Rectum: Normal. Guaiac result negative. No mass, tenderness, anal fissure, external hemorrhoid or internal hemorrhoid. Normal anal tone. Comments: Normal urethral meatus, nl urethra, nl bladder. Musculoskeletal:         General: No swelling or tenderness. Normal range of motion. Cervical back: Normal range of motion and neck supple. No rigidity.    Lymphadenopathy:      Cervical: No cervical adenopathy. Skin:     General: Skin is warm and dry. Coloration: Skin is not jaundiced or pale. Findings: No bruising, erythema, lesion or rash. Neurological:      General: No focal deficit present. Mental Status: She is alert and oriented to person, place, and time. Mental status is at baseline. Deep Tendon Reflexes: Reflexes are normal and symmetric. Psychiatric:         Mood and Affect: Mood normal.         Behavior: Behavior normal.         Thought Content: Thought content normal.         Judgment: Judgment normal.       Assessment:   Annual  menopause      Plan:      Pap, calcium, exercise, mammogram, hemocult negative  Hot flashes still despite being on estradiol. Check estradiol level.         Geri Rios MD

## 2022-11-07 SDOH — HEALTH STABILITY: PHYSICAL HEALTH: ON AVERAGE, HOW MANY DAYS PER WEEK DO YOU ENGAGE IN MODERATE TO STRENUOUS EXERCISE (LIKE A BRISK WALK)?: 3 DAYS

## 2022-11-07 SDOH — HEALTH STABILITY: PHYSICAL HEALTH: ON AVERAGE, HOW MANY MINUTES DO YOU ENGAGE IN EXERCISE AT THIS LEVEL?: 20 MIN

## 2022-11-09 NOTE — PROGRESS NOTES
Diana Julian (:  1965) is a 59 y.o. female,New patient, here for evaluation of the following chief complaint(s):  Established New Doctor and Medication Refill      Chronic health issues include osteoarthritis, recurrent vertigo, rhinitis, familial heart disease, total hysterectomy BSO for benign reasons, BPPV, COVID 2022      Recently she started to See therapist- she is close to mother, lately having family issues amongst her sibling related to her mother.       ----History OA, back pain,  facet injection 10.2020. MRI 10/2020 with stenosis foraminal narrowing levo sclerosis and loss of disc multiple areas. -- Heart murmur- Echocardiogram  EF 66%. Trivial mitral regurgitation. Trivial pulmonic regurgitation. No chest pain or increased exercise intolerance Occasional palpitations when she drinks coffee     ---colonoscopy Dr. Marimar Velazquez  follow up  + hemorrhoidal banding doing well. 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 RA in mother. ASSESSMENT/PLAN:  1. Stiffness of hand joint, unspecified laterality-history of arthritis in multiple joints including her hands. Tenderness on the proximal PIP joints. Known family history of rheumatoid arthritis. -     DIANE Reflex to Antibody Cascade; Future    2. Primary osteoarthritis involving multiple joints-taking Advil as needed and also on Cymbalta. In the past, underwent physical therapy and injections in her facet joints. Continue to exercise, take Tylenol as needed and advised to use Advil sparingly. 3. BMI 40.0-44.9, adult (HCC)-currently uses Ozempic 1 mg weekly. Tolerating the medication well.   Wt Readings from Last 3 Encounters:   11/10/22 238 lb 12.8 oz (108.3 kg)   10/19/22 239 lb 6.4 oz (108.6 kg)   22 248 lb (112.5 kg)       -     Semaglutide, 1 MG/DOSE, (OZEMPIC, 1 MG/DOSE,) 4 MG/3ML SOPN; Inject 1 mg into the skin once a week, Disp-3 mL, R-3Normal    4. Chronic low back pain, unspecified back pain laterality, unspecified whether sciatica present-as problem 2. Intermittent episodes of low back pain, uses Advil almost daily. Advised to use Advil sparingly, use Tylenol instead. -     tiZANidine (ZANAFLEX) 2 MG tablet; TAKE 1 TABLET BY MOUTH EVERY NIGHT AT BEDTIME FOR MUSCLE PAIN, Disp-90 tablet, R-0Normal    5. Pure hypercholesterolemia-goal LDL less than 70 given family history positive of heart attack at early age and her father, brother and stroke in her grandparents. Diet modification, weight loss and regular exercise. -     Basic Metabolic Panel; Future  -     Lipid, Fasting; Future    6. Encounter to establish care with new doctor  -     Basic Metabolic Panel; Future    7. Encounter for monitoring chronic NSAID therapy-since the patient is on Advil, we will monitor her kidney function.  -     Basic Metabolic Panel; Future      Return in about 6 months (around 5/10/2023). Subjective   SUBJECTIVE/OBJECTIVE:  Medication Refill  Pertinent negatives include no abdominal pain, chest pain, chills, congestion, coughing, fatigue, fever, headaches, nausea, sore throat or vomiting. Review of Systems   Constitutional:  Negative for chills, fatigue and fever. HENT:  Negative for congestion, ear pain, rhinorrhea and sore throat. Eyes:  Negative for discharge, redness and visual disturbance. Respiratory:  Negative for cough and chest tightness. Cardiovascular:  Negative for chest pain, palpitations and leg swelling. Gastrointestinal:  Negative for abdominal pain, nausea and vomiting. Endocrine: Negative. Genitourinary:  Negative for dysuria. Musculoskeletal:  Negative for back pain and gait problem. Joint pain in finger. Skin: Negative. Neurological:  Negative for syncope, facial asymmetry, speech difficulty, light-headedness and headaches. Objective   Physical Exam  Vitals reviewed.    Constitutional: Appearance: Normal appearance. HENT:      Head: Normocephalic. Eyes:      Extraocular Movements: Extraocular movements intact. Pupils: Pupils are equal, round, and reactive to light. Cardiovascular:      Rate and Rhythm: Normal rate. Heart sounds: Murmur (faint murmur ( echo- normal)) heard. Pulmonary:      Effort: Pulmonary effort is normal.      Breath sounds: Normal breath sounds. Abdominal:      General: Bowel sounds are normal.      Palpations: Abdomen is soft. Musculoskeletal:         General: Normal range of motion. Skin:     General: Skin is warm. Neurological:      General: No focal deficit present. Mental Status: She is alert and oriented to person, place, and time. Mental status is at baseline. Psychiatric:         Mood and Affect: Mood normal.              Please note that this chart was generated using dragon dictation software. Although every effort was made to ensure the accuracy of this automated transcription, some errors in transcription may have occurred. An electronic signature was used to authenticate this note.     --Bryan Hawkins MD  in the hand- [facility]

## 2022-11-10 ENCOUNTER — OFFICE VISIT (OUTPATIENT)
Dept: INTERNAL MEDICINE CLINIC | Age: 57
End: 2022-11-10

## 2022-11-10 VITALS
SYSTOLIC BLOOD PRESSURE: 119 MMHG | HEART RATE: 90 BPM | OXYGEN SATURATION: 97 % | DIASTOLIC BLOOD PRESSURE: 76 MMHG | WEIGHT: 238.8 LBS | BODY MASS INDEX: 40.36 KG/M2 | TEMPERATURE: 97.8 F

## 2022-11-10 DIAGNOSIS — Z51.81 ENCOUNTER FOR MONITORING CHRONIC NSAID THERAPY: ICD-10-CM

## 2022-11-10 DIAGNOSIS — M25.649 STIFFNESS OF HAND JOINT, UNSPECIFIED LATERALITY: Primary | ICD-10-CM

## 2022-11-10 DIAGNOSIS — M15.9 PRIMARY OSTEOARTHRITIS INVOLVING MULTIPLE JOINTS: ICD-10-CM

## 2022-11-10 DIAGNOSIS — E78.00 PURE HYPERCHOLESTEROLEMIA: ICD-10-CM

## 2022-11-10 DIAGNOSIS — Z76.89 ENCOUNTER TO ESTABLISH CARE WITH NEW DOCTOR: ICD-10-CM

## 2022-11-10 DIAGNOSIS — G89.29 CHRONIC LOW BACK PAIN, UNSPECIFIED BACK PAIN LATERALITY, UNSPECIFIED WHETHER SCIATICA PRESENT: ICD-10-CM

## 2022-11-10 DIAGNOSIS — M54.50 CHRONIC LOW BACK PAIN, UNSPECIFIED BACK PAIN LATERALITY, UNSPECIFIED WHETHER SCIATICA PRESENT: ICD-10-CM

## 2022-11-10 DIAGNOSIS — Z79.1 ENCOUNTER FOR MONITORING CHRONIC NSAID THERAPY: ICD-10-CM

## 2022-11-10 LAB
ANION GAP SERPL CALCULATED.3IONS-SCNC: 11 MMOL/L (ref 3–16)
ANTI-NUCLEAR ANTIBODY (ANA): NEGATIVE
BUN BLDV-MCNC: 13 MG/DL (ref 7–20)
CALCIUM SERPL-MCNC: 9.7 MG/DL (ref 8.3–10.6)
CHLORIDE BLD-SCNC: 103 MMOL/L (ref 99–110)
CHOLESTEROL, FASTING: 178 MG/DL (ref 0–199)
CO2: 27 MMOL/L (ref 21–32)
CREAT SERPL-MCNC: 0.7 MG/DL (ref 0.6–1.1)
GFR SERPL CREATININE-BSD FRML MDRD: >60 ML/MIN/{1.73_M2}
GLUCOSE BLD-MCNC: 89 MG/DL (ref 70–99)
HDLC SERPL-MCNC: 49 MG/DL (ref 40–60)
LDL CHOLESTEROL CALCULATED: 104 MG/DL
POTASSIUM SERPL-SCNC: 4.5 MMOL/L (ref 3.5–5.1)
SODIUM BLD-SCNC: 141 MMOL/L (ref 136–145)
TRIGLYCERIDE, FASTING: 127 MG/DL (ref 0–150)
VLDLC SERPL CALC-MCNC: 25 MG/DL

## 2022-11-10 RX ORDER — SEMAGLUTIDE 1.34 MG/ML
1 INJECTION, SOLUTION SUBCUTANEOUS WEEKLY
Qty: 3 ML | Refills: 3 | Status: SHIPPED | OUTPATIENT
Start: 2022-11-10

## 2022-11-10 RX ORDER — TIZANIDINE 2 MG/1
TABLET ORAL
Qty: 90 TABLET | Refills: 0 | Status: SHIPPED | OUTPATIENT
Start: 2022-11-10

## 2022-11-10 ASSESSMENT — ENCOUNTER SYMPTOMS
NAUSEA: 0
ABDOMINAL PAIN: 0
COUGH: 0
SORE THROAT: 0
EYE DISCHARGE: 0
BACK PAIN: 0
EYE REDNESS: 0
RHINORRHEA: 0
VOMITING: 0
CHEST TIGHTNESS: 0

## 2022-12-05 ENCOUNTER — PATIENT MESSAGE (OUTPATIENT)
Dept: INTERNAL MEDICINE CLINIC | Age: 57
End: 2022-12-05

## 2022-12-05 RX ORDER — SEMAGLUTIDE 1.34 MG/ML
4 INJECTION, SOLUTION SUBCUTANEOUS WEEKLY
Qty: 12 ML | Refills: 11 | Status: SHIPPED | OUTPATIENT
Start: 2022-12-05

## 2022-12-05 RX ORDER — DULOXETIN HYDROCHLORIDE 60 MG/1
CAPSULE, DELAYED RELEASE ORAL
Qty: 90 CAPSULE | Refills: 3 | Status: SHIPPED | OUTPATIENT
Start: 2022-12-05

## 2022-12-05 NOTE — TELEPHONE ENCOUNTER
From: Jackie Geronimo  To: Dr. Arnold Willard: 12/5/2022 11:01 AM EST  Subject: Ozempic and duloxetine     I need prescriptions for these two medications. I have one more injection of Ozempic this Saturday. I think about a week of duloxetine left.  Thank you

## 2023-02-27 ENCOUNTER — TELEMEDICINE (OUTPATIENT)
Dept: INTERNAL MEDICINE CLINIC | Age: 58
End: 2023-02-27
Payer: COMMERCIAL

## 2023-02-27 DIAGNOSIS — R19.7 DIARRHEA OF PRESUMED INFECTIOUS ORIGIN: ICD-10-CM

## 2023-02-27 PROCEDURE — 99421 OL DIG E/M SVC 5-10 MIN: CPT | Performed by: STUDENT IN AN ORGANIZED HEALTH CARE EDUCATION/TRAINING PROGRAM

## 2023-02-27 RX ORDER — PROMETHAZINE HYDROCHLORIDE 25 MG/1
25 TABLET ORAL 3 TIMES DAILY PRN
Qty: 30 TABLET | Refills: 0 | Status: SHIPPED | OUTPATIENT
Start: 2023-02-27 | End: 2023-03-09

## 2023-02-27 RX ORDER — LOPERAMIDE HYDROCHLORIDE 2 MG/1
2 CAPSULE ORAL 4 TIMES DAILY PRN
Qty: 45 CAPSULE | Refills: 0 | Status: SHIPPED | OUTPATIENT
Start: 2023-02-27 | End: 2023-03-10

## 2023-02-27 SDOH — ECONOMIC STABILITY: FOOD INSECURITY: WITHIN THE PAST 12 MONTHS, THE FOOD YOU BOUGHT JUST DIDN'T LAST AND YOU DIDN'T HAVE MONEY TO GET MORE.: NEVER TRUE

## 2023-02-27 SDOH — ECONOMIC STABILITY: FOOD INSECURITY: WITHIN THE PAST 12 MONTHS, YOU WORRIED THAT YOUR FOOD WOULD RUN OUT BEFORE YOU GOT MONEY TO BUY MORE.: NEVER TRUE

## 2023-02-27 SDOH — ECONOMIC STABILITY: INCOME INSECURITY: HOW HARD IS IT FOR YOU TO PAY FOR THE VERY BASICS LIKE FOOD, HOUSING, MEDICAL CARE, AND HEATING?: NOT HARD AT ALL

## 2023-02-27 SDOH — ECONOMIC STABILITY: HOUSING INSECURITY
IN THE LAST 12 MONTHS, WAS THERE A TIME WHEN YOU DID NOT HAVE A STEADY PLACE TO SLEEP OR SLEPT IN A SHELTER (INCLUDING NOW)?: NO

## 2023-02-27 SDOH — ECONOMIC STABILITY: TRANSPORTATION INSECURITY
IN THE PAST 12 MONTHS, HAS LACK OF TRANSPORTATION KEPT YOU FROM MEETINGS, WORK, OR FROM GETTING THINGS NEEDED FOR DAILY LIVING?: NO

## 2023-02-27 ASSESSMENT — PATIENT HEALTH QUESTIONNAIRE - PHQ9
SUM OF ALL RESPONSES TO PHQ QUESTIONS 1-9: 0
SUM OF ALL RESPONSES TO PHQ QUESTIONS 1-9: 0
SUM OF ALL RESPONSES TO PHQ9 QUESTIONS 1 & 2: 0
SUM OF ALL RESPONSES TO PHQ QUESTIONS 1-9: 0
2. FEELING DOWN, DEPRESSED OR HOPELESS: 0
SUM OF ALL RESPONSES TO PHQ QUESTIONS 1-9: 0
1. LITTLE INTEREST OR PLEASURE IN DOING THINGS: 0

## 2023-02-27 NOTE — PROGRESS NOTES
Pricila Jimenez (:  1965) is a Established patient, here for evaluation of the following:    Assessment & Plan   Below is the assessment and plan developed based on review of pertinent history, physical exam, labs, studies, and medications. 1. Diarrhea of presumed infectious origin  Assessment & Plan:   Patient is having diarrhea. I suspect it is from viral gastroenteritis. She reports no one around her is sick. - Encouraged p.o. intake. Stay home from work. -Phenergan given for nausea. No follow-ups on file. Subjective   Patient reports diarrhea that started yesterday. She has occasional abdominal cramping. She also has nausea. She has had diarrhea every 30 minutes. She reports very watery stools. She denies any blood. She has not had any recent antibiotics. She has not been hospitalized recently. She feels that her diarrhea may be improving. Her last bowel movement was about an hour ago. Objective   Patient-Reported Vitals  No data recorded             Pricila Jimenez, was evaluated through a synchronous (real-time) audio-video encounter. The patient (or guardian if applicable) is aware that this is a billable service, which includes applicable co-pays. This Virtual Visit was conducted with patient's (and/or legal guardian's) consent. The visit was conducted pursuant to the emergency declaration under the [facility], [address] [place] [address] authority and the [employer] and [employer] General Act. Patient identification was verified, and a caregiver was present when appropriate.    The patient was located at Home: [address] [facility]  [address] [address]  Provider was located at [place] [address] ([address] [place], [place] [address]t): [address] [facility]  136 [facility],  122 [facility]-Johanny Elliott MD

## 2023-02-27 NOTE — ASSESSMENT & PLAN NOTE
Patient is having diarrhea. I suspect it is from viral gastroenteritis. She reports no one around her is sick. - Encouraged p.o. intake. Stay home from work. -Phenergan given for nausea.

## 2023-02-27 NOTE — LETTER
[address] [facility]  [address] [place] [address] [address] [id number]  Phone: [phone]  Fax: [phone]    Deirdre Davis MD        February 27, 2023     Patient: Girma Chatterjee   YOB: 1965   Date of Visit: 2/27/2023       To Whom It May Concern:    Mrs. Sergey Lassiter was seen on 2/27/2023. I have advised her to stay home from work until 3/1/2023 for her acute illness. She will return to full duty on 3/1/2023. Please excuse her absence   If you have any questions or concerns, please don't hesitate to call.  [phone]    Sincerely,        Deirdre Davis MD

## 2023-03-16 ENCOUNTER — TELEPHONE (OUTPATIENT)
Dept: INTERNAL MEDICINE CLINIC | Age: 58
End: 2023-03-16

## 2023-03-16 RX ORDER — SEMAGLUTIDE 2.68 MG/ML
2 INJECTION, SOLUTION SUBCUTANEOUS WEEKLY
Qty: 12 ML | Refills: 1 | Status: SHIPPED | OUTPATIENT
Start: 2023-03-16

## 2023-03-16 RX ORDER — SEMAGLUTIDE 1.34 MG/ML
4 INJECTION, SOLUTION SUBCUTANEOUS WEEKLY
Qty: 12 ML | Refills: 3 | Status: SHIPPED | OUTPATIENT
Start: 2023-03-16 | End: 2023-03-16 | Stop reason: DRUGHIGH

## 2023-05-23 DIAGNOSIS — R23.2 HOT FLASHES: ICD-10-CM

## 2023-05-23 RX ORDER — ESTRADIOL 2 MG/1
TABLET ORAL
Qty: 90 TABLET | Refills: 3 | Status: SHIPPED | OUTPATIENT
Start: 2023-05-23

## 2023-06-07 ASSESSMENT — ENCOUNTER SYMPTOMS
RHINORRHEA: 0
VOMITING: 0
EYE DISCHARGE: 0
SORE THROAT: 0
EYE REDNESS: 0
COUGH: 0
CHEST TIGHTNESS: 0
ABDOMINAL PAIN: 0
NAUSEA: 0
BACK PAIN: 0

## 2023-06-07 NOTE — PROGRESS NOTES
Mark Bunn (:  1965) is a 59 y.o. female,New patient, here for evaluation of the following chief complaint(s):  6 Month Follow-Up      Chronic health issues include osteoarthritis, recurrent vertigo, rhinitis, familial heart disease, total hysterectomy BSO for benign reasons, BPPV, COVID 2022      Recently she started to See therapist- she is close to mother, lately having family issues amongst her sibling related to her mother.       ----History OA, back pain,  facet injection 10.2020. MRI 10/2020 with stenosis foraminal narrowing levo sclerosis and loss of disc multiple areas. -- Heart murmur- Echocardiogram  EF 66%. Trivial mitral regurgitation. Trivial pulmonic regurgitation. No chest pain or increased exercise intolerance Occasional palpitations when she drinks coffee     ---colonoscopy Dr. Tulio Escalera  follow up  + hemorrhoidal banding doing well. 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 RA in mother. ASSESSMENT/PLAN:  1. Primary osteoarthritis involving multiple joints-uses ibuprofen only as needed. For her pain she has also been using Cymbalta with improvement in her symptoms. Use ibuprofen sparingly, alternate with Tylenol. 2. BMI 40.0-44.9, adult (HCC)-associated with hyperlipidemia, osteoarthritis, back pain. Was on Ozempic, no significant improvement. Will start her on Wegovy, discussed with her regarding low carbohydrate diet and exercise as tolerated. Patient under stress lately, family matter related to her sister. This has resulted in trouble losing weight. She is seeing a therapist by her work. Wt Readings from Last 3 Encounters:   23 235 lb 12.8 oz (107 kg)   11/10/22 238 lb 12.8 oz (108.3 kg)   10/19/22 239 lb 6.4 oz (108.6 kg)       -     Semaglutide-Weight Management (WEGOVY) 0.5 MG/0.5ML SOAJ SC injection;  Inject 0.5 mg into the skin every 7 days, Disp-12 mL, R-1Normal  -

## 2023-06-08 ENCOUNTER — OFFICE VISIT (OUTPATIENT)
Dept: INTERNAL MEDICINE CLINIC | Age: 58
End: 2023-06-08
Payer: COMMERCIAL

## 2023-06-08 VITALS
OXYGEN SATURATION: 99 % | DIASTOLIC BLOOD PRESSURE: 84 MMHG | HEIGHT: 65 IN | HEART RATE: 68 BPM | SYSTOLIC BLOOD PRESSURE: 134 MMHG | WEIGHT: 235.8 LBS | TEMPERATURE: 97 F | BODY MASS INDEX: 39.29 KG/M2

## 2023-06-08 DIAGNOSIS — E78.2 MIXED HYPERLIPIDEMIA: ICD-10-CM

## 2023-06-08 DIAGNOSIS — M15.9 PRIMARY OSTEOARTHRITIS INVOLVING MULTIPLE JOINTS: Primary | ICD-10-CM

## 2023-06-08 PROBLEM — E66.01 SEVERE OBESITY (BMI 35.0-39.9) WITH COMORBIDITY (HCC): Status: ACTIVE | Noted: 2023-06-08

## 2023-06-08 LAB
ALBUMIN SERPL-MCNC: 4 G/DL (ref 3.4–5)
ALBUMIN/GLOB SERPL: 1.8 {RATIO} (ref 1.1–2.2)
ALP SERPL-CCNC: 66 U/L (ref 40–129)
ALT SERPL-CCNC: 11 U/L (ref 10–40)
ANION GAP SERPL CALCULATED.3IONS-SCNC: 9 MMOL/L (ref 3–16)
AST SERPL-CCNC: 12 U/L (ref 15–37)
BILIRUB SERPL-MCNC: 0.4 MG/DL (ref 0–1)
BUN SERPL-MCNC: 13 MG/DL (ref 7–20)
CALCIUM SERPL-MCNC: 9.6 MG/DL (ref 8.3–10.6)
CHLORIDE SERPL-SCNC: 101 MMOL/L (ref 99–110)
CHOLEST SERPL-MCNC: 155 MG/DL (ref 0–199)
CO2 SERPL-SCNC: 29 MMOL/L (ref 21–32)
CREAT SERPL-MCNC: 0.7 MG/DL (ref 0.6–1.1)
GFR SERPLBLD CREATININE-BSD FMLA CKD-EPI: >60 ML/MIN/{1.73_M2}
GLUCOSE SERPL-MCNC: 83 MG/DL (ref 70–99)
HDLC SERPL-MCNC: 59 MG/DL (ref 40–60)
LDL CHOLESTEROL CALCULATED: 77 MG/DL
POTASSIUM SERPL-SCNC: 4.7 MMOL/L (ref 3.5–5.1)
PROT SERPL-MCNC: 6.2 G/DL (ref 6.4–8.2)
SODIUM SERPL-SCNC: 139 MMOL/L (ref 136–145)
TRIGL SERPL-MCNC: 97 MG/DL (ref 0–150)
VLDLC SERPL CALC-MCNC: 19 MG/DL

## 2023-06-08 PROCEDURE — 99214 OFFICE O/P EST MOD 30 MIN: CPT | Performed by: STUDENT IN AN ORGANIZED HEALTH CARE EDUCATION/TRAINING PROGRAM

## 2023-06-08 RX ORDER — SEMAGLUTIDE 0.5 MG/.5ML
0.5 INJECTION, SOLUTION SUBCUTANEOUS
Qty: 12 ML | Refills: 1 | Status: SHIPPED | OUTPATIENT
Start: 2023-06-08

## 2023-06-08 RX ORDER — ATORVASTATIN CALCIUM 40 MG/1
40 TABLET, FILM COATED ORAL DAILY
Qty: 90 TABLET | Refills: 3 | Status: SHIPPED | OUTPATIENT
Start: 2023-06-08

## 2023-06-27 ENCOUNTER — TELEPHONE (OUTPATIENT)
Dept: PRIMARY CARE CLINIC | Age: 58
End: 2023-06-27

## 2023-06-27 NOTE — TELEPHONE ENCOUNTER
Submitted PA for Froilan Faith [facility]: [id number] STATUS: PENDING. Follow up done daily; if no response in three days we will refax for status check. If another three days goes by with no response we will call the insurance for status.

## 2023-06-30 NOTE — TELEPHONE ENCOUNTER
Message from plan: This request has not been approved. Your request was reviewed by the health plan and based on the information submitted was not approved. Medications for weight loss are an EXCLUDED BENEFIT by the health plan and are not covered for any reason.  This Excluded Benefit is not eligible for appeal.  CDEJMK 0.5MG/0.5ML auto-injectors

## 2023-07-05 RX ORDER — ORLISTAT 120 MG/1
120 CAPSULE ORAL
Qty: 270 CAPSULE | Refills: 1 | Status: SHIPPED | OUTPATIENT
Start: 2023-07-05

## 2023-09-01 ENCOUNTER — HOSPITAL ENCOUNTER (OUTPATIENT)
Dept: WOMENS IMAGING | Age: 58
Discharge: HOME | End: 2023-09-01
Payer: COMMERCIAL

## 2023-09-01 VITALS — HEIGHT: 65 IN | WEIGHT: 231 LBS | BODY MASS INDEX: 38.49 KG/M2

## 2023-09-01 DIAGNOSIS — Z01.419 WELL WOMAN EXAM WITH ROUTINE GYNECOLOGICAL EXAM: ICD-10-CM

## 2023-09-01 PROCEDURE — 77063 BREAST TOMOSYNTHESIS BI: CPT

## 2023-09-05 ENCOUNTER — COMMUNITY OUTREACH (OUTPATIENT)
Dept: INTERNAL MEDICINE CLINIC | Age: 58
End: 2023-09-05

## 2023-09-05 NOTE — PROGRESS NOTES
Patient's HM shows they are overdue for [address] and  files searched  without success. Naomi Velazquez

## 2023-09-19 LAB
CHOLEST SERPL-MCNC: 172 MG/DL (ref 0–199)
GLUCOSE SERPL-MCNC: 86 MG/DL (ref 70–99)
HDLC SERPL-MCNC: 55 MG/DL (ref 40–60)
LDLC SERPL CALC-MCNC: 88 MG/DL
TRIGL SERPL-MCNC: 144 MG/DL (ref 0–150)

## 2024-02-14 NOTE — PROGRESS NOTES
Mellissa Ellis (:  1965) is a 58 y.o. female,New patient, here for evaluation of the following chief complaint(s):  Weight Loss      Chronic health issues include osteoarthritis, recurrent vertigo, rhinitis, familial heart disease, total hysterectomy BSO for benign reasons, BPPV, COVID 2022           ----History OA, back pain,  facet injection 10.2020. MRI 10/2020 with stenosis foraminal narrowing levo sclerosis and loss of disc multiple areas.   For her pain she has also been using Cymbalta with improvement in her symptoms.    -- Heart murmur- Echocardiogram  EF 66%. Trivial mitral regurgitation. Trivial pulmonic regurgitation. No chest pain or increased exercise intolerance Occasional palpitations when she drinks coffee     ---colonoscopy Dr. Gregory 8. follow up  + hemorrhoidal banding doing well.        ---Depression/stress.  She has started seeing a therapist  from her work.  Family issues with siblings related to her mother.  Her mother is 99 years old and her sister does not take responsibility.  Her mother constantly discusses about this problem with her.  Recently she started to See therapist- she is close to mother, lately having family issues amongst her sibling related to her mother.  Mother is 99 yrs old.  Still getting therapies and helpful.     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 RA in mother.     ASSESSMENT/PLAN:    1. Arthritis of carpometacarpal (CMC) joint of left thumb,recommend use thumb brace, Tylenol and ibuprofen alternatively.  Referrals made for hand specialist.  -     Elliot Fisher MD, Hand Surgery (Hand, Wrist, Upper Extremity), [facility]  2. Mixed hyperlipidemia  Continue with Lipitor daily, labs ordered.  -     atorvastatin (LIPITOR) 40 MG tablet; Take 1 tablet by mouth daily, Disp-90 tablet, R-3Normal  -     Comprehensive Metabolic Panel; Future  3. BMI 40.0-44.9,

## 2024-02-15 ENCOUNTER — OFFICE VISIT (OUTPATIENT)
Dept: INTERNAL MEDICINE CLINIC | Age: 59
End: 2024-02-15
Payer: COMMERCIAL

## 2024-02-15 VITALS
HEIGHT: 65 IN | HEART RATE: 69 BPM | WEIGHT: 246 LBS | BODY MASS INDEX: 40.98 KG/M2 | SYSTOLIC BLOOD PRESSURE: 125 MMHG | OXYGEN SATURATION: 97 % | DIASTOLIC BLOOD PRESSURE: 80 MMHG

## 2024-02-15 DIAGNOSIS — E78.2 MIXED HYPERLIPIDEMIA: ICD-10-CM

## 2024-02-15 DIAGNOSIS — M18.12 ARTHRITIS OF CARPOMETACARPAL (CMC) JOINT OF LEFT THUMB: Primary | ICD-10-CM

## 2024-02-15 LAB
ALBUMIN SERPL-MCNC: 4.3 G/DL (ref 3.4–5)
ALBUMIN/GLOB SERPL: 2 {RATIO} (ref 1.1–2.2)
ALP SERPL-CCNC: 72 U/L (ref 40–129)
ALT SERPL-CCNC: 11 U/L (ref 10–40)
ANION GAP SERPL CALCULATED.3IONS-SCNC: 9 MMOL/L (ref 3–16)
AST SERPL-CCNC: 15 U/L (ref 15–37)
BILIRUB SERPL-MCNC: 0.4 MG/DL (ref 0–1)
BUN SERPL-MCNC: 15 MG/DL (ref 7–20)
CALCIUM SERPL-MCNC: 9.3 MG/DL (ref 8.3–10.6)
CHLORIDE SERPL-SCNC: 99 MMOL/L (ref 99–110)
CO2 SERPL-SCNC: 29 MMOL/L (ref 21–32)
CREAT SERPL-MCNC: 0.9 MG/DL (ref 0.6–1.2)
GFR SERPLBLD CREATININE-BSD FMLA CKD-EPI: >60 ML/MIN/{1.73_M2}
GLUCOSE SERPL-MCNC: 95 MG/DL (ref 70–99)
POTASSIUM SERPL-SCNC: 4.4 MMOL/L (ref 3.5–5.1)
PROT SERPL-MCNC: 6.5 G/DL (ref 6.4–8.2)
SODIUM SERPL-SCNC: 137 MMOL/L (ref 136–145)
TSH SERPL DL<=0.005 MIU/L-ACNC: 2.59 UIU/ML (ref 0.27–4.2)

## 2024-02-15 PROCEDURE — 99214 OFFICE O/P EST MOD 30 MIN: CPT | Performed by: STUDENT IN AN ORGANIZED HEALTH CARE EDUCATION/TRAINING PROGRAM

## 2024-02-15 RX ORDER — ATORVASTATIN CALCIUM 40 MG/1
40 TABLET, FILM COATED ORAL DAILY
Qty: 90 TABLET | Refills: 3 | Status: SHIPPED | OUTPATIENT
Start: 2024-02-15

## 2024-02-23 RX ORDER — DULOXETIN HYDROCHLORIDE 60 MG/1
CAPSULE, DELAYED RELEASE ORAL
Qty: 90 CAPSULE | Refills: 0 | Status: SHIPPED | OUTPATIENT
Start: 2024-02-23

## 2024-02-23 NOTE — TELEPHONE ENCOUNTER
Future Appointments   Date Time Provider Department Center   8/22/2024 10:45 AM Jassi Stephenson MD [facility]         Last appt 2/15/24

## 2024-03-05 ENCOUNTER — OFFICE VISIT (OUTPATIENT)
Age: 59
End: 2024-03-05

## 2024-03-05 VITALS
DIASTOLIC BLOOD PRESSURE: 88 MMHG | SYSTOLIC BLOOD PRESSURE: 124 MMHG | BODY MASS INDEX: 42.77 KG/M2 | TEMPERATURE: 98.1 F | HEART RATE: 78 BPM | WEIGHT: 257 LBS | OXYGEN SATURATION: 98 %

## 2024-03-05 DIAGNOSIS — J02.9 PHARYNGITIS, UNSPECIFIED ETIOLOGY: Primary | ICD-10-CM

## 2024-03-05 LAB
INFLUENZA A ANTIGEN, POC: NORMAL
INFLUENZA B ANTIGEN, POC: NORMAL
S PYO AG THROAT QL: NORMAL

## 2024-03-05 ASSESSMENT — ENCOUNTER SYMPTOMS
COUGH: 1
RHINORRHEA: 0
SORE THROAT: 1
DIARRHEA: 0
VOMITING: 0

## 2024-03-05 NOTE — PATIENT INSTRUCTIONS
-Tylenol and ibuprofen as needed  -Follow up with your PCP as needed.    -If your symptoms worsen, go to the nearest Emergency Department

## 2024-03-05 NOTE — PROGRESS NOTES
Mellissa Ellis (:  1965) is a 58 y.o. female, New patient, here for evaluation of the following chief complaint(s):  Pharyngitis (Symptoms for three days.)      ASSESSMENT/PLAN:    ICD-10-CM    1. Pharyngitis, unspecified etiology  J02.9 POCT rapid strep A     POCT Influenza A/B Antigen        POC testing: Discussed result(s) with patient  Results for POC orders placed in visit on 24   POCT Influenza A/B Antigen   Result Value Ref Range    Inflenza A Ag neg     Influenza B Ag neg    POCT rapid strep A   Result Value Ref Range    Strep A Ag None Detected None Detected     Rapid strep- negative  Influenza -  negative    Ddx -  strep, viral pharyngitis, PTA, other  Management Given: tylenol and ibuprofen as needed  FU with PCP or return for worsening    SUBJECTIVE/OBJECTIVE:  Patient presents for sore throat for the past 3 days.  Started with body aches today.  Denies fever.  Has had some cough.  Denies congestion or rhinorrhea.  Denies vomiting or diarrhea. Only sick exposure was granddaughter who had a runny nose last week, but otherwise seemed fine      History provided by:  Patient      Vitals:    24 1449   BP: 124/88   Site: Left Upper Arm   Pulse: 78   Temp: 98.1 °F (36.7 °C)   TempSrc: Oral   SpO2: 98%   Weight: 116.6 kg (257 lb)     Review of Systems   Constitutional:  Negative for fever.   HENT:  Positive for sore throat. Negative for congestion and rhinorrhea.    Respiratory:  Positive for cough.    Gastrointestinal:  Negative for diarrhea and vomiting.     Physical Exam  Constitutional:       General: She is not in acute distress.     Appearance: Normal appearance. She is well-developed. She is not ill-appearing, toxic-appearing or diaphoretic.   HENT:      Head: Normocephalic and atraumatic.      Right Ear: Tympanic membrane, ear canal and external ear normal.      Left Ear: Tympanic membrane, ear canal and external ear normal.      Mouth/Throat:      Mouth: Mucous membranes are

## 2024-03-06 ENCOUNTER — TELEPHONE (OUTPATIENT)
Dept: INTERNAL MEDICINE CLINIC | Age: 59
End: 2024-03-06

## 2024-03-06 NOTE — TELEPHONE ENCOUNTER
Janneth called stating that she went to the urgent care yesterday with a very raw/sore throat. They told her it's viral but she is experiencing sinus drainage and a headache today. She is wanting to know if you could order her a steroid pack to help.   Please call patient back to discuss.     Please send the patient prescriptions to [facility] in [place] on [address]    Thank you

## 2024-03-07 DIAGNOSIS — J20.8 ACUTE BRONCHITIS, VIRAL: Primary | ICD-10-CM

## 2024-03-07 RX ORDER — METHYLPREDNISOLONE 4 MG/1
TABLET ORAL
Qty: 1 KIT | Refills: 0 | Status: SHIPPED | OUTPATIENT
Start: 2024-03-07 | End: 2024-03-13

## 2024-03-08 NOTE — TELEPHONE ENCOUNTER
Patient has been sent to the pharmacy at [facility].       Left message on VM to call to let us know if she picked up steroid pack

## 2024-03-09 ENCOUNTER — HOSPITAL ENCOUNTER (EMERGENCY)
Age: 59
Discharge: HOME | End: 2024-03-09
Attending: EMERGENCY MEDICINE
Payer: COMMERCIAL

## 2024-03-09 ENCOUNTER — APPOINTMENT (OUTPATIENT)
Dept: GENERAL RADIOLOGY | Age: 59
End: 2024-03-09
Payer: COMMERCIAL

## 2024-03-09 VITALS
HEART RATE: 74 BPM | OXYGEN SATURATION: 96 % | WEIGHT: 257.06 LBS | TEMPERATURE: 98.5 F | RESPIRATION RATE: 17 BRPM | DIASTOLIC BLOOD PRESSURE: 84 MMHG | HEIGHT: 65 IN | BODY MASS INDEX: 42.83 KG/M2 | SYSTOLIC BLOOD PRESSURE: 137 MMHG

## 2024-03-09 DIAGNOSIS — J01.90 ACUTE SINUSITIS, RECURRENCE NOT SPECIFIED, UNSPECIFIED LOCATION: Primary | ICD-10-CM

## 2024-03-09 PROCEDURE — 71046 X-RAY EXAM CHEST 2 VIEWS: CPT

## 2024-03-09 PROCEDURE — 99283 EMERGENCY DEPT VISIT LOW MDM: CPT

## 2024-03-09 RX ORDER — DOXYCYCLINE HYCLATE 100 MG
100 TABLET ORAL 2 TIMES DAILY
Qty: 20 TABLET | Refills: 0 | Status: SHIPPED | OUTPATIENT
Start: 2024-03-09 | End: 2024-03-19

## 2024-03-09 ASSESSMENT — PAIN DESCRIPTION - ORIENTATION: ORIENTATION: RIGHT

## 2024-03-09 ASSESSMENT — PAIN DESCRIPTION - FREQUENCY: FREQUENCY: INTERMITTENT

## 2024-03-09 ASSESSMENT — LIFESTYLE VARIABLES
HOW OFTEN DO YOU HAVE A DRINK CONTAINING ALCOHOL: NEVER
HOW MANY STANDARD DRINKS CONTAINING ALCOHOL DO YOU HAVE ON A TYPICAL DAY: PATIENT DOES NOT DRINK

## 2024-03-09 ASSESSMENT — PAIN SCALES - GENERAL: PAINLEVEL_OUTOF10: 4

## 2024-03-09 ASSESSMENT — PAIN - FUNCTIONAL ASSESSMENT
PAIN_FUNCTIONAL_ASSESSMENT: PREVENTS OR INTERFERES SOME ACTIVE ACTIVITIES AND ADLS
PAIN_FUNCTIONAL_ASSESSMENT: NONE - DENIES PAIN
PAIN_FUNCTIONAL_ASSESSMENT: 0-10

## 2024-03-09 ASSESSMENT — PAIN DESCRIPTION - DESCRIPTORS: DESCRIPTORS: SORE;DISCOMFORT

## 2024-03-09 ASSESSMENT — PAIN DESCRIPTION - PAIN TYPE: TYPE: ACUTE PAIN

## 2024-03-09 ASSESSMENT — PAIN DESCRIPTION - LOCATION: LOCATION: RIB CAGE

## 2024-03-09 NOTE — ED PROVIDER NOTES
Cancer Neg Hx     Heart Failure Neg Hx     Hypertension Neg Hx     Migraines Neg Hx     Ovarian Cancer Neg Hx     Rashes/Skin Problems Neg Hx     Seizures Neg Hx      Social History     Socioeconomic History    Marital status:      Spouse name: Not on file    Number of children: 3    Years of education: Not on file    Highest education level: Not on file   Occupational History    Occupation: RN   Tobacco Use    Smoking status: Never    Smokeless tobacco: Never   Vaping Use    Vaping Use: Never used   Substance and Sexual Activity    Alcohol use: Yes     Comment: Very little. Just occasionally    Drug use: No    Sexual activity: Yes     Partners: Male   Other Topics Concern    Not on file   Social History Narrative    Not on file     Social Determinants of Health     Financial Resource Strain: Low Risk  (7/21/2022)    Overall Financial Resource Strain (CARDIA)     Difficulty of Paying Living Expenses: Not hard at all   Food Insecurity: Not on file (2/27/2023)   Transportation Needs: Not on file   Physical Activity: Insufficiently Active (11/7/2022)    Exercise Vital Sign     Days of Exercise per Week: 3 days     Minutes of Exercise per Session: 20 min   Stress: Not on file   Social Connections: Not on file   Intimate Partner Violence: Not At Risk (11/7/2022)    Humiliation, Afraid, Rape, and Kick questionnaire     Fear of Current or Ex-Partner: No     Emotionally Abused: No     Physically Abused: No     Sexually Abused: No   Housing Stability: Not on file     No current facility-administered medications for this encounter.     Current Outpatient Medications   Medication Sig Dispense Refill    doxycycline hyclate (VIBRA-TABS) 100 MG tablet Take 1 tablet by mouth 2 times daily for 10 days 20 tablet 0    methylPREDNISolone (MEDROL DOSEPACK) 4 MG tablet Take by mouth. 1 kit 0    DULoxetine (CYMBALTA) 60 MG extended release capsule TAKE ONE CAPSULE BY MOUTH ONCE A DAY AS NEEDED FOR PAIN 90 capsule 0     component.  Her lungs are clear and she is a non-smoker.    - History obtained from: Patient  - Records reviewed: Today's vitals and diagnostic      - Consults:   None     - Pertinent social determinants: None  - Tests considered: As above  - Disposition considerations: Discharge    Chronic Conditions: None pertinent      Is this patient to be included in the SEP-1 Core Measure?  No   Exclusion criteria - the patient is NOT to be included for SEP-1 Core Measure due to:  2+ SIRS criteria are not met    IMonty MD, am the primary clinician of record.     During the patient's ED course, the patient was given:  Medications - No data to display     Patient was given prescriptions for the following medications. I counseled the patient as to how to take these medications.  New Prescriptions    DOXYCYCLINE HYCLATE (VIBRA-TABS) 100 MG TABLET    Take 1 tablet by mouth 2 times daily for 10 days       Patient instructed to follow up with:   [facility]  [address] [address]  [phone]    If symptoms worsen    Jassi Stephenson MD  [address]  [address]  [facility] [id number] [phone]            All questions were answered and the patient/family expressed understanding and agreement with the plan.     CRITICAL CARE  N/A    CLINICAL IMPRESSION  1. Acute sinusitis, recurrence not specified, unspecified location        DISPOSITION  Decision To Discharge 03/09/2024 10:55:39 AM     Monty Bess MD    Note: This chart was created using voice recognition dictation software. Efforts were made by me to ensure accuracy, however some errors may be present due to limitations of this technology and occasionally words are not transcribed correctly.        Monty Bess MD  03/09/24 [id number]

## 2024-03-18 ENCOUNTER — OFFICE VISIT (OUTPATIENT)
Dept: ORTHOPEDIC SURGERY | Age: 59
End: 2024-03-18
Payer: COMMERCIAL

## 2024-03-18 VITALS — RESPIRATION RATE: 16 BRPM | WEIGHT: 257 LBS | HEIGHT: 65 IN | BODY MASS INDEX: 42.82 KG/M2

## 2024-03-18 DIAGNOSIS — M79.645 PAIN OF LEFT THUMB: Primary | ICD-10-CM

## 2024-03-18 DIAGNOSIS — M18.12 ARTHRITIS OF CARPOMETACARPAL (CMC) JOINT OF LEFT THUMB: ICD-10-CM

## 2024-03-18 PROCEDURE — 99203 OFFICE O/P NEW LOW 30 MIN: CPT | Performed by: PHYSICIAN ASSISTANT

## 2024-03-18 PROCEDURE — MISCCMC2 OTS BREG CMC THUMB GUARD: Performed by: PHYSICIAN ASSISTANT

## 2024-03-18 SDOH — HEALTH STABILITY: PHYSICAL HEALTH: ON AVERAGE, HOW MANY MINUTES DO YOU ENGAGE IN EXERCISE AT THIS LEVEL?: 40 MIN

## 2024-03-18 SDOH — HEALTH STABILITY: PHYSICAL HEALTH: ON AVERAGE, HOW MANY DAYS PER WEEK DO YOU ENGAGE IN MODERATE TO STRENUOUS EXERCISE (LIKE A BRISK WALK)?: 3 DAYS

## 2024-03-18 NOTE — PROGRESS NOTES
use of the other modalities were discussed.  I have clearly explained to her that the above outlined treatment plan should not be expected to 'cure' her arthritic condition, but we are rather treating the symptoms with which she presents.  She has understood that in order to achieve more durable relief of her symptoms and to prevent future worsening or further damage, that definitive surgical treatment would be required. Ms. Mellissa Ellis  voiced an appropriate understanding of our discussion, the options available to her, and of the expectations of her selected  treatment.    I have also discussed with Ms. Mellissa Ellis  the other treatment options available to her  for this condition.  We have today selected to proceed with conservative management.  She and I have agreed that if our current course of conservative treatment does not prove to be effective over the short term future, that she will schedule a follow-up appointment to discuss and select an alternate course of therapy including possibly injection or surgical treatment.         Procedures    [employer] Thumb Guard $20     Scheduling Instructions:      Patient was supplied a [employer] Thumb Guard.  This retail item was supplied to provide functional support and assist in protecting the affected area.              Verbal and written instructions for the use of and application of this item were provided.  The patient was educated and fit by a healthcare professional with expert knowledge and specialization in brace application.  They were instructed to contact the office immediately should the equipment       result in increased pain, decreased sensation, increased swelling or worsening of the condition.       Ms. Mellissa Ellis has been given a full verbal list of instructions and precautions related to her present condition.  I have asked her to followup with me in the office at the prescribed time. She is also specifically requested to call or

## 2024-06-13 ENCOUNTER — OFFICE VISIT (OUTPATIENT)
Dept: GYNECOLOGY | Age: 59
End: 2024-06-13
Payer: COMMERCIAL

## 2024-06-13 VITALS
HEART RATE: 70 BPM | DIASTOLIC BLOOD PRESSURE: 76 MMHG | BODY MASS INDEX: 42.02 KG/M2 | OXYGEN SATURATION: 98 % | SYSTOLIC BLOOD PRESSURE: 126 MMHG | RESPIRATION RATE: 17 BRPM | HEIGHT: 65 IN | WEIGHT: 252.21 LBS

## 2024-06-13 DIAGNOSIS — R23.2 HOT FLASHES: ICD-10-CM

## 2024-06-13 DIAGNOSIS — Z01.419 WELL WOMAN EXAM WITH ROUTINE GYNECOLOGICAL EXAM: Primary | ICD-10-CM

## 2024-06-13 DIAGNOSIS — F33.1 MAJOR DEPRESSIVE DISORDER, RECURRENT, MODERATE (HCC): ICD-10-CM

## 2024-06-13 DIAGNOSIS — F33.0 MAJOR DEPRESSIVE DISORDER, RECURRENT, MILD (HCC): ICD-10-CM

## 2024-06-13 PROBLEM — F33.9 MAJOR DEPRESSIVE DISORDER, RECURRENT, UNSPECIFIED (HCC): Status: ACTIVE | Noted: 2024-06-13

## 2024-06-13 LAB — ESTRADIOL SERPL-MCNC: 15 PG/ML

## 2024-06-13 PROCEDURE — 99396 PREV VISIT EST AGE 40-64: CPT | Performed by: OBSTETRICS & GYNECOLOGY

## 2024-06-13 RX ORDER — ESTRADIOL 2 MG/1
2 TABLET ORAL DAILY
Qty: 90 TABLET | Refills: 3 | Status: SHIPPED | OUTPATIENT
Start: 2024-06-13

## 2024-06-17 ASSESSMENT — ENCOUNTER SYMPTOMS
GASTROINTESTINAL NEGATIVE: 1
ALLERGIC/IMMUNOLOGIC NEGATIVE: 1
EYES NEGATIVE: 1
RESPIRATORY NEGATIVE: 1

## 2024-06-17 NOTE — PROGRESS NOTES
membranes are moist.      Pharynx: Oropharynx is clear.   Eyes:      Extraocular Movements: Extraocular movements intact.   Neck:      Thyroid: No thyromegaly.   Cardiovascular:      Rate and Rhythm: Normal rate and regular rhythm.      Heart sounds: Normal heart sounds. No murmur heard.     No friction rub. No gallop.   Pulmonary:      Effort: Pulmonary effort is normal. No respiratory distress.      Breath sounds: Normal breath sounds. No wheezing or rales.   Chest:      Chest wall: No tenderness.   Breasts:     Right: No swelling, bleeding, inverted nipple, mass, nipple discharge, skin change or tenderness.      Left: No swelling, bleeding, inverted nipple, mass, nipple discharge, skin change or tenderness.   Abdominal:      General: Abdomen is flat. There is no distension.      Palpations: Abdomen is soft. There is no hepatomegaly or mass.      Tenderness: There is no abdominal tenderness. There is no guarding or rebound.      Hernia: No hernia is present. There is no hernia in the left inguinal area.   Genitourinary:     Exam position: Lithotomy position.      Labia:         Right: No rash, tenderness, lesion or injury.         Left: No rash, tenderness, lesion or injury.       Urethra: No prolapse, urethral pain, urethral swelling or urethral lesion.      Vagina: Normal. No signs of injury and foreign body. No vaginal discharge, erythema, tenderness, bleeding or lesions.      Adnexa: Right adnexa normal and left adnexa normal.        Right: No mass, tenderness or fullness.          Left: No mass, tenderness or fullness.        Rectum: Normal. Guaiac result negative. No mass, tenderness, anal fissure, external hemorrhoid or internal hemorrhoid. Normal anal tone.      Comments: Normal urethral meatus, nl urethra, nl bladder.  Musculoskeletal:         General: No swelling or tenderness. Normal range of motion.      Cervical back: Normal range of motion and neck supple. No rigidity.   Lymphadenopathy:

## 2024-06-18 DIAGNOSIS — R23.2 HOT FLASHES: Primary | ICD-10-CM

## 2024-06-18 RX ORDER — FEZOLINETANT 45 MG/1
1 TABLET, FILM COATED ORAL DAILY
Qty: 90 TABLET | Refills: 3 | Status: SHIPPED | OUTPATIENT
Start: 2024-06-18

## 2024-06-18 RX ORDER — DULOXETIN HYDROCHLORIDE 60 MG/1
CAPSULE, DELAYED RELEASE ORAL
Qty: 90 CAPSULE | Refills: 0 | Status: SHIPPED | OUTPATIENT
Start: 2024-06-18

## 2024-06-18 NOTE — TELEPHONE ENCOUNTER
Medication:   Requested Prescriptions     Pending Prescriptions Disp Refills    DULoxetine (CYMBALTA) 60 MG extended release capsule [Pharmacy Med Name: DULOXETINE HCL 60MG CPEP] 90 capsule 0     Sig: TAKE ONE CAPSULE BY MOUTH ONCE A DAY AS NEEDED FOR PAIN       Last Filled:      Patient Phone Number: [phone] (home) [phone] (work)    Last appt: 2/15/2024   Next appt: 8/22/2024  Future Appointments   Date Time Provider Department Center   8/22/2024 10:45 AM Jassi Stephenson MD [facility] - DYD   6/19/2025 10:00 AM Soraya Arguello MD [facility] MMA

## 2024-06-20 ENCOUNTER — TELEPHONE (OUTPATIENT)
Dept: ADMINISTRATIVE | Age: 59
End: 2024-06-20

## 2024-06-20 NOTE — TELEPHONE ENCOUNTER
Submitted PA for Veozah 45MG tablets   [id number]  STATUS: PENDING.    Follow up done daily; if no decision with in three days we will refax.  If another three days goes by with no decision will call the insurance for status.

## 2024-07-02 DIAGNOSIS — R23.2 HOT FLASHES: ICD-10-CM

## 2024-07-02 RX ORDER — ESTRADIOL 2 MG/1
2 TABLET ORAL DAILY
Qty: 90 TABLET | Refills: 3 | Status: SHIPPED | OUTPATIENT
Start: 2024-07-02

## 2024-07-18 DIAGNOSIS — R23.2 HOT FLASHES: ICD-10-CM

## 2024-07-18 RX ORDER — FEZOLINETANT 45 MG/1
1 TABLET, FILM COATED ORAL DAILY
Qty: 90 TABLET | Refills: 3 | Status: SHIPPED | OUTPATIENT
Start: 2024-07-18

## 2024-08-22 ENCOUNTER — TELEMEDICINE (OUTPATIENT)
Dept: INTERNAL MEDICINE CLINIC | Age: 59
End: 2024-08-22

## 2024-08-22 DIAGNOSIS — R11.2 NAUSEA AND VOMITING, UNSPECIFIED VOMITING TYPE: Primary | ICD-10-CM

## 2024-08-22 DIAGNOSIS — R10.84 GENERALIZED ABDOMINAL PAIN: ICD-10-CM

## 2024-08-22 DIAGNOSIS — Z02.89 ENCOUNTER TO OBTAIN EXCUSE FROM WORK: ICD-10-CM

## 2024-08-22 DIAGNOSIS — K52.9 GASTROENTERITIS: ICD-10-CM

## 2024-08-22 RX ORDER — ONDANSETRON 4 MG/1
4 TABLET, FILM COATED ORAL EVERY 12 HOURS PRN
Qty: 15 TABLET | Refills: 0 | Status: SHIPPED | OUTPATIENT
Start: 2024-08-22

## 2024-08-22 SDOH — ECONOMIC STABILITY: FOOD INSECURITY: WITHIN THE PAST 12 MONTHS, YOU WORRIED THAT YOUR FOOD WOULD RUN OUT BEFORE YOU GOT MONEY TO BUY MORE.: NEVER TRUE

## 2024-08-22 SDOH — ECONOMIC STABILITY: FOOD INSECURITY: WITHIN THE PAST 12 MONTHS, THE FOOD YOU BOUGHT JUST DIDN'T LAST AND YOU DIDN'T HAVE MONEY TO GET MORE.: NEVER TRUE

## 2024-08-22 SDOH — ECONOMIC STABILITY: INCOME INSECURITY: HOW HARD IS IT FOR YOU TO PAY FOR THE VERY BASICS LIKE FOOD, HOUSING, MEDICAL CARE, AND HEATING?: NOT HARD AT ALL

## 2024-08-22 ASSESSMENT — ENCOUNTER SYMPTOMS
VOMITING: 1
DIARRHEA: 1
COUGH: 0
NAUSEA: 1
ABDOMINAL PAIN: 1
SORE THROAT: 0

## 2024-08-22 NOTE — PROGRESS NOTES
symptoms.  Monday and Tuesday afternoon chills. Ibuprofen taken.    Nausea & Vomiting  This is a new problem. The current episode started in the past 7 days. Associated symptoms include abdominal pain, chills, headaches, myalgias, nausea and vomiting. Pertinent negatives include no coughing, rash or sore throat. Treatments tried: phenergan. The treatment provided mild relief.   Diarrhea   Associated symptoms include abdominal pain, chills, headaches, myalgias and vomiting. Pertinent negatives include no coughing.     Review of Systems   Constitutional:  Positive for chills.   HENT:  Negative for sore throat.    Respiratory:  Negative for cough.    Gastrointestinal:  Positive for abdominal pain, diarrhea, nausea and vomiting.   Musculoskeletal:  Positive for myalgias.   Skin:  Negative for rash.   Neurological:  Positive for headaches.          Objective   Patient-Reported Vitals  No data recorded     Physical Exam  [INSTRUCTIONS:  \"[x]\" Indicates a positive item  \"[]\" Indicates a negative item  -- DELETE ALL ITEMS NOT EXAMINED]    Constitutional: [x] Appears well-developed and well-nourished [x] No apparent distress      [] Abnormal -     Mental status: [x] Alert and awake  [x] Oriented to person/place/time [x] Able to follow commands    [] Abnormal -     Eyes:   EOM    [x]  Normal    [] Abnormal -   Sclera  [x]  Normal    [] Abnormal -          Discharge [x]  None visible   [] Abnormal -     HENT: [x] Normocephalic, atraumatic  [] Abnormal -   [x] Mouth/Throat: Mucous membranes are moist    External Ears [x] Normal  [] Abnormal -    Neck: [x] No visualized mass [] Abnormal -     Pulmonary/Chest: [x] Respiratory effort normal   [x] No visualized signs of difficulty breathing or respiratory distress        [] Abnormal -      Musculoskeletal:   [x] Normal gait with no signs of ataxia         [x] Normal range of motion of neck        [] Abnormal -     Neurological:        [x] No Facial Asymmetry (Cranial nerve 7 motor

## 2024-08-26 NOTE — PROGRESS NOTES
Medicare Wellness Visit  Plan for Preventive Care    A good way for you to stay healthy is to use preventive care.  Medicare covers many services that can help you stay healthy.* The goal of these services is to find any health problems as quickly as possible. Finding problems early can help make them easier to treat.  Your personal plan below lists the services you may need and when they are due.      Health Maintenance Summary       Varicella Vaccine (1 of 2 - 13+ 2-dose series)  Never done    Pneumococcal Vaccine 0-64 (2 of 2 - PCV)  Overdue since 7/14/2021    COVID-19 Vaccine (3 - 2023-24 season)  Overdue since 9/1/2023    Medicare Advantage- Medicare Wellness Visit (Yearly - January to December)  Overdue since 1/1/2024    Influenza Vaccine (1)  Next due on 9/1/2024    DTaP/Tdap/Td Vaccine (5 - Td or Tdap)  Next due on 7/14/2030    Hepatitis B Vaccine   Completed    Meningococcal Vaccine   Aged Out    HPV Vaccine   Aged Out             Preventive Care for Women and Men    Heart Screenings (Cardiovascular):  Blood tests are used to check your cholesterol, lipid and triglyceride levels. High levels can increase your risk for heart disease and stroke. High levels can be treated with medications, diet and exercise. Lowering your levels can help keep your heart and blood vessels healthy.  Your provider will order these tests if they are needed.    An ultrasound is done to see if you have an abdominal aortic aneurysm (AAA).  This is an enlargement of one of the main blood vessels that delivers blood to the body.   In the [place], 9,000 deaths are caused by AAA.  You may not even know you have this problem and as many as 1 in 3 people will have a serious problem if it is not treated.  Early diagnosis allows for more effective treatment and cure.  If you have a family history of AAA or are a male age 65-75 who has smoked, you are at higher risk of an AAA.  Your provider can order this test, if  duplicated needed.    Colorectal Screening:  There are many tests that are used to check for cancer of your colon and rectum. You and your provider should discuss what test is best for you and when to have it done.  Options include:  Screening Colonoscopy: exam of the entire colon, seen through a flexible lighted tube.  Flexible Sigmoidoscopy: exam of the last third (sigmoid portion) of the colon and rectum, seen through a flexible lighted tube.  Cologuard DNA stool test: a sample of your stool is used to screen for cancer and unseen blood in your stool.  Fecal Occult Blood Test: a sample of your stool is studied to find any unseen blood    Flu Shot:  An immunization that helps to prevent influenza (the flu). You should get this every year. The best time to get the shot is in the fall.    Pneumococcal Shot:  Vaccines help prevent pneumococcal disease, which is any type of illness caused by Streptococcus pneumoniae bacteria. There are two kinds of pneumococcal vaccines available in the [place]:   Pneumococcal conjugate vaccines (PCV20 or Dfizbmb01®)  Pneumococcal polysaccharide vaccine (PPSV23 or Hghasofym72®)  For those who have never received any pneumococcal conjugate vaccine, CDC recommends PVC20 for adults 65 years or older and adults 19 through 64 years old with certain medical conditions or risk factors.   For those who have previously received PCV13, this should be followed by a dose of PPSV23.     Hepatitis B Shot:  An immunization that helps to protect people from getting Hepatitis B. Hepatitis B is a virus that spreads through contact with infected blood or body fluids. Many people with the virus do not have symptoms.  The virus can lead to serious problems, such as liver disease. Some people are at higher risk than others. Your doctor will tell you if you need this shot.     Diabetes Screening:  A test to measure sugar (glucose) in your blood is called a fasting blood sugar. Fasting means you cannot have food  or drink for at least 8 hours before the test. This test can detect diabetes long before you may notice symptoms.    Glaucoma Screening:  Glaucoma screening is performed by your eye doctor. The test measures the fluid pressure inside your eyes to determine if you have glaucoma.     Hepatitis C Screening:  A blood test to see if you have the hepatitis C virus.  Hepatitis C attacks the liver and is a major cause of chronic liver disease.  Medicare will cover a single screening for all adults born between 1945 & 1965, or high risk patients (people who have injected illegal drugs or people who have had blood transfusions).  High risk patients who continue to inject illegal drugs can be screened for Hepatitis C every year.    Smoking and Tobacco-Use Cessation Counseling:  Tobacco is the single greatest cause of disease and early death in our country today. Medication and counseling together can increase a person’s chance of quitting for good.   Medicare covers two quitting attempts per year, with four counseling sessions per attempt (eight sessions in a 12 month period)    Preventive Screening tests for Women    Screening Mammograms and Breast Exams:  An x-ray of your breasts to check for breast cancer before you or your doctor may be able to feel it.  If breast cancer is found early it can usually be treated with success.    Pelvic Exams and Pap Tests:  An exam to check for cervical and vaginal cancer. A Pap test is a lab test in which cells are taken from your cervix and sent to the lab to look for signs of cervical cancer. If cancer of the cervix is found early, chances for a cure are good. Testing can generally end at age 65, or if a woman has a hysterectomy for a benign condition. Your provider may recommend more frequent testing if certain abnormal results are found.    Bone Mass Measurements:  A painless x-ray of your bone density to see if you are at risk for a broken bone. Bone density refers to the thickness of  bones or how tightly the bone tissue is packed.    Preventive Screening tests for Men    Prostate Screening:  Should you have a prostate cancer test (PSA)?  It is up to you to decide if you want a prostate cancer test. Talk to your clinician to find out if the test is right for you.  Things for you to consider and talk about should include:  Benefits and harms of the test  Your family history  How your race/ethnicity may influence the test  If the test may impact other medical conditions you have  Your values on screenings and treatments    *Medicare pays for many preventive services to keep you healthy. For some of these services, you might have to pay a deductible, coinsurance, and / or copayment.  The amounts vary depending on the type of services you need and the kind of Medicare health plan you have.    For further details on screenings offered by Medicare please visit: https://www.medicare.gov/coverage/preventive-screening-services

## 2024-09-04 ASSESSMENT — ENCOUNTER SYMPTOMS
VOMITING: 0
EYE REDNESS: 0
ABDOMINAL PAIN: 0
SORE THROAT: 0
CHEST TIGHTNESS: 0
COUGH: 0
NAUSEA: 0
EYE DISCHARGE: 0
BACK PAIN: 0
RHINORRHEA: 0

## 2024-09-04 NOTE — PROGRESS NOTES
Mellissa Ellis (:  1965) is a 59 y.o. female,New patient, here for evaluation of the following chief complaint(s):  No chief complaint on file.      Chronic health issues include osteoarthritis, recurrent vertigo, rhinitis, familial heart disease, total hysterectomy BSO for benign reasons, BPPV, COVID 2022    ----History OA, back pain,  facet injection 10.2020. MRI 10/2020 with stenosis foraminal narrowing levo sclerosis and loss of disc multiple areas.   For her pain she has also been using Cymbalta with improvement in her symptoms.    -- Heart murmur- Echocardiogram  EF 66%. Trivial mitral regurgitation. Trivial pulmonic regurgitation. No chest pain or increased exercise intolerance Occasional palpitations when she drinks coffee     ---colonoscopy Dr. Gregory 8. follow up  + hemorrhoidal banding doing well.        ---Depression/stress.  She has started seeing a therapist  from her work.  Also takes Cymbalta which is helping both for her back pain and mood.       Works as a nurse in [employer].  FH: ALS in sister- 54  Stent in brother, bypass in father. RA in mother.    Assessment & Plan  Mixed hyperlipidemia    Stable  Continue with Lipitor 40 mg daily.  Labs ordered.  Orders:    Lipid, Fasting; Future    Comprehensive Metabolic Panel; Future    Comprehensive Metabolic Panel    Lipid, Fasting    Stress at home    Currently stable, continue with Cymbalta daily.         Chronic low back pain without sciatica, unspecified back pain laterality    Stable, continue with Cymbalta daily.    Orders:    DULoxetine (CYMBALTA) 60 MG extended release capsule; TAKE ONE CAPSULE BY DAILY FOR PAIN       Return in about 6 months (around 3/5/2025).         Subjective   SUBJECTIVE/OBJECTIVE:  Medication Refill  Pertinent negatives include no abdominal pain, chest pain, chills, congestion, coughing, fatigue, fever, headaches, nausea, sore throat or vomiting.       Review of Systems

## 2024-09-05 ENCOUNTER — OFFICE VISIT (OUTPATIENT)
Dept: INTERNAL MEDICINE CLINIC | Age: 59
End: 2024-09-05

## 2024-09-05 DIAGNOSIS — M54.50 CHRONIC LOW BACK PAIN WITHOUT SCIATICA, UNSPECIFIED BACK PAIN LATERALITY: ICD-10-CM

## 2024-09-05 DIAGNOSIS — E78.2 MIXED HYPERLIPIDEMIA: Primary | ICD-10-CM

## 2024-09-05 DIAGNOSIS — G89.29 CHRONIC LOW BACK PAIN WITHOUT SCIATICA, UNSPECIFIED BACK PAIN LATERALITY: ICD-10-CM

## 2024-09-05 DIAGNOSIS — F43.9 STRESS AT HOME: ICD-10-CM

## 2024-09-05 LAB
ALBUMIN SERPL-MCNC: 4 G/DL (ref 3.4–5)
ALBUMIN/GLOB SERPL: 1.9 {RATIO} (ref 1.1–2.2)
ALP SERPL-CCNC: 63 U/L (ref 40–129)
ALT SERPL-CCNC: 22 U/L (ref 10–40)
ANION GAP SERPL CALCULATED.3IONS-SCNC: 10 MMOL/L (ref 3–16)
AST SERPL-CCNC: 20 U/L (ref 15–37)
BILIRUB SERPL-MCNC: 0.4 MG/DL (ref 0–1)
BUN SERPL-MCNC: 15 MG/DL (ref 7–20)
CALCIUM SERPL-MCNC: 9.4 MG/DL (ref 8.3–10.6)
CHLORIDE SERPL-SCNC: 102 MMOL/L (ref 99–110)
CHOLEST SERPL-MCNC: 170 MG/DL (ref 0–199)
CO2 SERPL-SCNC: 26 MMOL/L (ref 21–32)
CREAT SERPL-MCNC: 0.7 MG/DL (ref 0.6–1.2)
GFR SERPLBLD CREATININE-BSD FMLA CKD-EPI: >90 ML/MIN/{1.73_M2}
GLUCOSE SERPL-MCNC: 85 MG/DL (ref 70–99)
HDLC SERPL-MCNC: 45 MG/DL (ref 40–60)
LDL CHOLESTEROL: 100 MG/DL
POTASSIUM SERPL-SCNC: 4 MMOL/L (ref 3.5–5.1)
PROT SERPL-MCNC: 6.1 G/DL (ref 6.4–8.2)
SODIUM SERPL-SCNC: 138 MMOL/L (ref 136–145)
TRIGL SERPL-MCNC: 124 MG/DL (ref 0–150)
VLDLC SERPL CALC-MCNC: 25 MG/DL

## 2024-09-05 RX ORDER — DULOXETIN HYDROCHLORIDE 60 MG/1
CAPSULE, DELAYED RELEASE ORAL
Qty: 90 CAPSULE | Refills: 0 | Status: SHIPPED | OUTPATIENT
Start: 2024-09-05

## 2024-09-05 NOTE — ASSESSMENT & PLAN NOTE
Stable  Continue with Lipitor 40 mg daily.  Labs ordered.  Orders:    Lipid, Fasting; Future    Comprehensive Metabolic Panel; Future    Comprehensive Metabolic Panel    Lipid, Fasting

## 2024-09-05 NOTE — ASSESSMENT & PLAN NOTE
Stable, continue with Cymbalta daily.    Orders:    DULoxetine (CYMBALTA) 60 MG extended release capsule; TAKE ONE CAPSULE BY DAILY FOR PAIN

## 2024-09-06 VITALS
HEART RATE: 68 BPM | BODY MASS INDEX: 41.44 KG/M2 | WEIGHT: 249 LBS | DIASTOLIC BLOOD PRESSURE: 88 MMHG | OXYGEN SATURATION: 97 % | SYSTOLIC BLOOD PRESSURE: 126 MMHG

## 2024-09-24 DIAGNOSIS — M62.838 NECK MUSCLE SPASM: Primary | ICD-10-CM

## 2024-09-24 LAB
CHOLEST SERPL-MCNC: 162 MG/DL (ref 0–199)
GLUCOSE SERPL-MCNC: 82 MG/DL (ref 70–99)
HDLC SERPL-MCNC: 54 MG/DL (ref 40–60)
LDLC SERPL CALC-MCNC: 83 MG/DL
TRIGL SERPL-MCNC: 124 MG/DL (ref 0–150)

## 2024-09-24 RX ORDER — TIZANIDINE 2 MG/1
2 TABLET ORAL NIGHTLY PRN
Qty: 10 TABLET | Refills: 0 | Status: SHIPPED | OUTPATIENT
Start: 2024-09-24

## 2025-02-12 DIAGNOSIS — G89.29 CHRONIC LOW BACK PAIN WITHOUT SCIATICA, UNSPECIFIED BACK PAIN LATERALITY: ICD-10-CM

## 2025-02-12 DIAGNOSIS — M54.50 CHRONIC LOW BACK PAIN WITHOUT SCIATICA, UNSPECIFIED BACK PAIN LATERALITY: ICD-10-CM

## 2025-02-13 RX ORDER — DULOXETIN HYDROCHLORIDE 60 MG/1
CAPSULE, DELAYED RELEASE ORAL
Qty: 90 CAPSULE | Refills: 0 | Status: SHIPPED | OUTPATIENT
Start: 2025-02-13

## 2025-02-13 NOTE — TELEPHONE ENCOUNTER
Future Appointments   Date Time Provider Department Center   3/6/2025 10:00 AM Jassi Stephenson MD [facility] BS ECC DEP   6/19/2025 10:00 AM [facility]       Last appt 9/5/24

## 2025-03-11 DIAGNOSIS — E78.2 MIXED HYPERLIPIDEMIA: ICD-10-CM

## 2025-03-11 RX ORDER — ATORVASTATIN CALCIUM 40 MG/1
40 TABLET, FILM COATED ORAL DAILY
Qty: 90 TABLET | Refills: 0 | Status: SHIPPED | OUTPATIENT
Start: 2025-03-11

## 2025-03-11 NOTE — TELEPHONE ENCOUNTER
Medication:   Requested Prescriptions     Pending Prescriptions Disp Refills    atorvastatin (LIPITOR) 40 MG tablet [Pharmacy Med Name: ATORVASTATIN CALCIUM 40MG TABS] 90 tablet 3     Sig: TAKE ONE TABLET BY MOUTH ONCE A DAY       Last Filled:      Patient Phone Number: [phone] (home) [phone] (work)    Last appt: 9/5/2024   Next appt: 3/27/2025  Future Appointments   Date Time Provider Department Center   3/27/2025 10:15 AM Jassi Stephenson MD [facility] ECC DEP

## 2025-03-26 SDOH — ECONOMIC STABILITY: INCOME INSECURITY: IN THE LAST 12 MONTHS, WAS THERE A TIME WHEN YOU WERE NOT ABLE TO PAY THE MORTGAGE OR RENT ON TIME?: NO

## 2025-03-26 SDOH — ECONOMIC STABILITY: TRANSPORTATION INSECURITY
IN THE PAST 12 MONTHS, HAS THE LACK OF TRANSPORTATION KEPT YOU FROM MEDICAL APPOINTMENTS OR FROM GETTING MEDICATIONS?: NO

## 2025-03-26 SDOH — ECONOMIC STABILITY: FOOD INSECURITY: WITHIN THE PAST 12 MONTHS, THE FOOD YOU BOUGHT JUST DIDN'T LAST AND YOU DIDN'T HAVE MONEY TO GET MORE.: NEVER TRUE

## 2025-03-26 SDOH — ECONOMIC STABILITY: FOOD INSECURITY: WITHIN THE PAST 12 MONTHS, YOU WORRIED THAT YOUR FOOD WOULD RUN OUT BEFORE YOU GOT MONEY TO BUY MORE.: NEVER TRUE

## 2025-03-26 ASSESSMENT — PATIENT HEALTH QUESTIONNAIRE - PHQ9
8. MOVING OR SPEAKING SO SLOWLY THAT OTHER PEOPLE COULD HAVE NOTICED. OR THE OPPOSITE - BEING SO FIDGETY OR RESTLESS THAT YOU HAVE BEEN MOVING AROUND A LOT MORE THAN USUAL: NOT AT ALL
1. LITTLE INTEREST OR PLEASURE IN DOING THINGS: NOT AT ALL
SUM OF ALL RESPONSES TO PHQ QUESTIONS 1-9: 0
SUM OF ALL RESPONSES TO PHQ QUESTIONS 1-9: 0
2. FEELING DOWN, DEPRESSED OR HOPELESS: NOT AT ALL
10. IF YOU CHECKED OFF ANY PROBLEMS, HOW DIFFICULT HAVE THESE PROBLEMS MADE IT FOR YOU TO DO YOUR WORK, TAKE CARE OF THINGS AT HOME, OR GET ALONG WITH OTHER PEOPLE: NOT DIFFICULT AT ALL
5. POOR APPETITE OR OVEREATING: NOT AT ALL
6. FEELING BAD ABOUT YOURSELF - OR THAT YOU ARE A FAILURE OR HAVE LET YOURSELF OR YOUR FAMILY DOWN: NOT AT ALL
SUM OF ALL RESPONSES TO PHQ QUESTIONS 1-9: 0
3. TROUBLE FALLING OR STAYING ASLEEP: NOT AT ALL
8. MOVING OR SPEAKING SO SLOWLY THAT OTHER PEOPLE COULD HAVE NOTICED. OR THE OPPOSITE, BEING SO FIGETY OR RESTLESS THAT YOU HAVE BEEN MOVING AROUND A LOT MORE THAN USUAL: NOT AT ALL
6. FEELING BAD ABOUT YOURSELF - OR THAT YOU ARE A FAILURE OR HAVE LET YOURSELF OR YOUR FAMILY DOWN: NOT AT ALL
SUM OF ALL RESPONSES TO PHQ QUESTIONS 1-9: 0
2. FEELING DOWN, DEPRESSED OR HOPELESS: NOT AT ALL
7. TROUBLE CONCENTRATING ON THINGS, SUCH AS READING THE NEWSPAPER OR WATCHING TELEVISION: NOT AT ALL
10. IF YOU CHECKED OFF ANY PROBLEMS, HOW DIFFICULT HAVE THESE PROBLEMS MADE IT FOR YOU TO DO YOUR WORK, TAKE CARE OF THINGS AT HOME, OR GET ALONG WITH OTHER PEOPLE: NOT DIFFICULT AT ALL
4. FEELING TIRED OR HAVING LITTLE ENERGY: NOT AT ALL
7. TROUBLE CONCENTRATING ON THINGS, SUCH AS READING THE NEWSPAPER OR WATCHING TELEVISION: NOT AT ALL
9. THOUGHTS THAT YOU WOULD BE BETTER OFF DEAD, OR OF HURTING YOURSELF: NOT AT ALL
9. THOUGHTS THAT YOU WOULD BE BETTER OFF DEAD, OR OF HURTING YOURSELF: NOT AT ALL
4. FEELING TIRED OR HAVING LITTLE ENERGY: NOT AT ALL
5. POOR APPETITE OR OVEREATING: NOT AT ALL
3. TROUBLE FALLING OR STAYING ASLEEP: NOT AT ALL
1. LITTLE INTEREST OR PLEASURE IN DOING THINGS: NOT AT ALL
SUM OF ALL RESPONSES TO PHQ QUESTIONS 1-9: 0

## 2025-03-26 ASSESSMENT — ENCOUNTER SYMPTOMS
EYE REDNESS: 0
COUGH: 0
NAUSEA: 0
VOMITING: 0
CHEST TIGHTNESS: 0
BACK PAIN: 0
ABDOMINAL PAIN: 0
SORE THROAT: 0
EYE DISCHARGE: 0
RHINORRHEA: 0

## 2025-03-26 NOTE — PROGRESS NOTES
Mellissa Ellis (:  1965) is a 59 y.o. female,New patient, here for evaluation of the following chief complaint(s):  Hyperlipidemia      Chronic health issues include osteoarthritis, recurrent vertigo, rhinitis, familial heart disease, total hysterectomy BSO for benign reasons, BPPV, COVID 2022    ----History OA, back pain,  facet injection 10.2020. MRI 10/2020 with stenosis foraminal narrowing levo sclerosis and loss of disc multiple areas.   For her pain she has also been using Cymbalta with improvement in her symptoms.    -- Heart murmur- Echocardiogram  EF 66%. Trivial mitral regurgitation. Trivial pulmonic regurgitation. No chest pain or increased exercise intolerance Occasional palpitations when she drinks coffee     ---colonoscopy Dr. Gregory 8. follow up  + hemorrhoidal banding doing well.        ---Depression/stress.  She has started seeing a therapist  from her work.  Also takes Cymbalta which is helping both for her back pain and mood.       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 RA in mother.    Assessment & Plan  Mixed hyperlipidemia   Chronic, at goal (stable), continue current treatment plan  Takes Lipitor 40 mg daily.       BMI 40.0-44.9, adult   Not at goal.  Patient has joined [employer], exercises 2 hours in a week.  Also does weights.  Continue with increased physical activity and low calorie diet.         Major depressive disorder, recurrent, moderate (HCC)   Chronic, at goal (stable), follows therapist, has been helpful.  Also taking Cymbalta for her back pain.  Continue.         Return in about 6 months (around 2025) for annual physical examination, Blood work.         Subjective   SUBJECTIVE/OBJECTIVE:  Medication Refill  Pertinent negatives include no abdominal pain, chest pain, chills, congestion, coughing, fatigue, fever, headaches, nausea, sore throat or vomiting.       Review of Systems

## 2025-03-27 ENCOUNTER — OFFICE VISIT (OUTPATIENT)
Dept: INTERNAL MEDICINE CLINIC | Age: 60
End: 2025-03-27
Payer: COMMERCIAL

## 2025-03-27 VITALS
HEART RATE: 65 BPM | WEIGHT: 250 LBS | SYSTOLIC BLOOD PRESSURE: 120 MMHG | BODY MASS INDEX: 41.6 KG/M2 | OXYGEN SATURATION: 94 % | DIASTOLIC BLOOD PRESSURE: 70 MMHG

## 2025-03-27 DIAGNOSIS — F33.1 MAJOR DEPRESSIVE DISORDER, RECURRENT, MODERATE (HCC): ICD-10-CM

## 2025-03-27 DIAGNOSIS — E78.2 MIXED HYPERLIPIDEMIA: Primary | ICD-10-CM

## 2025-03-27 PROCEDURE — 99214 OFFICE O/P EST MOD 30 MIN: CPT | Performed by: STUDENT IN AN ORGANIZED HEALTH CARE EDUCATION/TRAINING PROGRAM

## 2025-03-27 PROCEDURE — G2211 COMPLEX E/M VISIT ADD ON: HCPCS | Performed by: STUDENT IN AN ORGANIZED HEALTH CARE EDUCATION/TRAINING PROGRAM

## 2025-03-27 SDOH — ECONOMIC STABILITY: FOOD INSECURITY: WITHIN THE PAST 12 MONTHS, YOU WORRIED THAT YOUR FOOD WOULD RUN OUT BEFORE YOU GOT MONEY TO BUY MORE.: NEVER TRUE

## 2025-03-27 SDOH — ECONOMIC STABILITY: FOOD INSECURITY: WITHIN THE PAST 12 MONTHS, THE FOOD YOU BOUGHT JUST DIDN'T LAST AND YOU DIDN'T HAVE MONEY TO GET MORE.: NEVER TRUE

## 2025-03-27 NOTE — ASSESSMENT & PLAN NOTE
Not at goal.  Patient has joined [employer], exercises 2 hours in a week.  Also does weights.  Continue with increased physical activity and low calorie diet.

## 2025-03-27 NOTE — ASSESSMENT & PLAN NOTE
Chronic, at goal (stable), follows therapist, has been helpful.  Also taking Cymbalta for her back pain.  Continue.

## 2025-06-01 DIAGNOSIS — E78.2 MIXED HYPERLIPIDEMIA: ICD-10-CM

## 2025-06-01 DIAGNOSIS — G89.29 CHRONIC LOW BACK PAIN WITHOUT SCIATICA, UNSPECIFIED BACK PAIN LATERALITY: ICD-10-CM

## 2025-06-01 DIAGNOSIS — M54.50 CHRONIC LOW BACK PAIN WITHOUT SCIATICA, UNSPECIFIED BACK PAIN LATERALITY: ICD-10-CM

## 2025-06-02 DIAGNOSIS — R23.2 HOT FLASHES: ICD-10-CM

## 2025-06-02 RX ORDER — ESTRADIOL 2 MG/1
2 TABLET ORAL DAILY
Qty: 90 TABLET | Refills: 3 | Status: SHIPPED | OUTPATIENT
Start: 2025-06-02

## 2025-06-02 RX ORDER — DULOXETIN HYDROCHLORIDE 60 MG/1
CAPSULE, DELAYED RELEASE ORAL
Qty: 90 CAPSULE | Refills: 0 | Status: SHIPPED | OUTPATIENT
Start: 2025-06-02

## 2025-06-02 RX ORDER — ATORVASTATIN CALCIUM 40 MG/1
40 TABLET, FILM COATED ORAL DAILY
Qty: 90 TABLET | Refills: 0 | Status: SHIPPED | OUTPATIENT
Start: 2025-06-02

## 2025-06-02 RX ORDER — ESTRADIOL 0.1 MG/D
1 PATCH TRANSDERMAL WEEKLY
Qty: 12 PATCH | Refills: 3 | Status: SHIPPED | OUTPATIENT
Start: 2025-06-02

## 2025-06-02 NOTE — TELEPHONE ENCOUNTER
Hospital called stating that patient is out of medication. Need a refill on Estradiol Pills and Estradiol Patches        [facility] - [place], [place] - [facility] [facility] - P [phone] - F [phone] [[id number]]

## 2025-06-02 NOTE — TELEPHONE ENCOUNTER
Future Appointments   Date Time Provider Department Center   7/3/2025 10:20 AM [facility]   9/25/2025 10:15 AM Jassi Stephenson MD [facility] ECC DEP

## 2025-06-02 NOTE — TELEPHONE ENCOUNTER
Future Appointments   Date Time Provider Department Center   7/3/2025 10:20 AM [facility]   9/25/2025 10:15 AM Jassi Stephenson MD [facility] DEP

## 2025-06-02 NOTE — TELEPHONE ENCOUNTER
Future Appointments   Date Time Provider Department Center   7/3/2025 10:20 AM Soraya Arguello MD [facility]   9/25/2025 10:15 AM Jassi Stephenson MD [facility] E[facility] DEP     LOV 6/13/2024      Requested Prescriptions     Pending Prescriptions Disp Refills    estradiol (CLIMARA) 0.1 MG/24HR 12 patch 3     Sig: Place 1 patch onto the skin once a week    estradiol (ESTRACE) 2 MG tablet 90 tablet 3     Sig: Take 1 tablet by mouth daily

## 2025-07-03 ENCOUNTER — OFFICE VISIT (OUTPATIENT)
Dept: GYNECOLOGY | Age: 60
End: 2025-07-03
Payer: COMMERCIAL

## 2025-07-03 VITALS
HEART RATE: 77 BPM | RESPIRATION RATE: 17 BRPM | BODY MASS INDEX: 41.15 KG/M2 | SYSTOLIC BLOOD PRESSURE: 122 MMHG | HEIGHT: 65 IN | OXYGEN SATURATION: 97 % | WEIGHT: 247 LBS | DIASTOLIC BLOOD PRESSURE: 72 MMHG

## 2025-07-03 DIAGNOSIS — Z01.419 WELL WOMAN EXAM WITH ROUTINE GYNECOLOGICAL EXAM: Primary | ICD-10-CM

## 2025-07-03 DIAGNOSIS — Z78.0 MENOPAUSE: ICD-10-CM

## 2025-07-03 LAB
CHOLEST SERPL-MCNC: 152 MG/DL (ref 0–199)
GLUCOSE SERPL-MCNC: 85 MG/DL (ref 70–99)
HDLC SERPL-MCNC: 46 MG/DL (ref 40–60)
LDLC SERPL CALC-MCNC: 87 MG/DL
TRIGL SERPL-MCNC: 97 MG/DL (ref 0–150)

## 2025-07-03 PROCEDURE — 99396 PREV VISIT EST AGE 40-64: CPT | Performed by: OBSTETRICS & GYNECOLOGY

## 2025-07-03 RX ORDER — ESTRADIOL 2 MG/1
2 TABLET ORAL 2 TIMES DAILY
Qty: 180 TABLET | Refills: 3 | Status: SHIPPED | OUTPATIENT
Start: 2025-07-03

## (undated) DEVICE — PROTECTOR EYE PT SELF ADH NS OPT GRD LF

## (undated) DEVICE — ELECTRO LUBE IS A SINGLE PATIENT USE DEVICE THAT IS INTENDED TO BE USED ON ELECTROSURGICAL ELECTRODES TO REDUCE STICKING.: Brand: KEY SURGICAL ELECTRO LUBE

## (undated) DEVICE — 30977 SEE SHARP - ENHANCED INTRAOPERATIVE LAPAROSCOPE CLEANING & DEFOGGING: Brand: 30977 SEE SHARP - ENHANCED INTRAOPERATIVE LAPAROSCOPE CLEANING & DEFOGGING

## (undated) DEVICE — Z DISCONTINUED USE 2271023 SYRINGE IRRIGATION TOOM 70 CC CATH LUER ADPT TIP PLAS STRL

## (undated) DEVICE — FS-30 DEVICE - (30MM CERVICAL CUP): Brand: MCCARUS-VOLKER FORNISEE® SYSTEM

## (undated) DEVICE — DEVICE SECUREMENT AD W/ TRICOT ANCHR PD FOR F LTX SIL CATH

## (undated) DEVICE — APPLICATOR MEDICATED 26 CC SOLUTION HI LT ORNG CHLORAPREP

## (undated) DEVICE — CANNULA SEAL

## (undated) DEVICE — STRIP,CLOSURE,WOUND,MEDI-STRIP,1/2X4: Brand: MEDLINE

## (undated) DEVICE — COVER LT HNDL BLU PLAS

## (undated) DEVICE — ARM DRAPE

## (undated) DEVICE — INSUFFLATION NEEDLE TO ESTABLISH PNEUMOPERITONEUM.: Brand: INSUFFLATION NEEDLE

## (undated) DEVICE — LAPAROSCOPY PK

## (undated) DEVICE — Z DISCONTINUED BY MEDLINE USE 2711682 TRAY SKIN PREP DRY W/ PREM GLV

## (undated) DEVICE — PMI DISPOSABLE PUNCTURE CLOSURE DEVICE / SUTURE GRASPER: Brand: PMI

## (undated) DEVICE — TOTAL TRAY, 16FR 10ML SIL FOLEY, URN: Brand: MEDLINE

## (undated) DEVICE — DRAPE,UNDERBUTTOCKS,PCH,STERILE: Brand: MEDLINE

## (undated) DEVICE — Device

## (undated) DEVICE — GLOVE SURG SZ 65 CRM LTX FREE POLYISOPRENE POLYMER BEAD ANTI

## (undated) DEVICE — ELECTRODE PT RET AD L9FT HI MOIST COND ADH HYDRGEL CORDED

## (undated) DEVICE — MERCY HEALTH WEST TURNOVER: Brand: MEDLINE INDUSTRIES, INC.

## (undated) DEVICE — AIRSEAL 8 MM ACCESS PORT AND LOW PROFILE OBTURATOR WITH BLADELESS OPTICAL TIP, 120 MM LENGTH: Brand: AIRSEAL

## (undated) DEVICE — SYRINGE IRRIG 60ML SFT PLIABLE BLB EZ TO GRP 1 HND USE W/

## (undated) DEVICE — TRI-LUMEN FILTERED TUBE SET WITH ACTIVATED CHARCOAL FILTER: Brand: AIRSEAL

## (undated) DEVICE — SUTURE VCRL SZ 0 L27IN ABSRB UD L26MM CT-2 1/2 CIR J270H

## (undated) DEVICE — 3M™ TEGADERM™ TRANSPARENT FILM DRESSING FRAME STYLE, 1624W, 2-3/8 IN X 2-3/4 IN (6 CM X 7 CM), 100/CT 4CT/CASE: Brand: 3M™ TEGADERM™

## (undated) DEVICE — PAD SANITARY MTRN TAB BELT WRP NS 11IN

## (undated) DEVICE — BLADELESS OBTURATOR: Brand: WECK VISTA

## (undated) DEVICE — COLUMN DRAPE

## (undated) DEVICE — SOLUTION SCRB 4OZ 10% POVIDONE IOD ANTIMIC BTL

## (undated) DEVICE — SOLUTION IV IRRIG POUR BRL 0.9% SODIUM CHL 2F7124

## (undated) DEVICE — TIP COVER ACCESSORY

## (undated) DEVICE — INTENDED FOR TISSUE SEPARATION, AND OTHER PROCEDURES THAT REQUIRE A SHARP SURGICAL BLADE TO PUNCTURE OR CUT.: Brand: BARD-PARKER ® STAINLESS STEEL BLADES

## (undated) DEVICE — 3M™ STERI-STRIP™ COMPOUND BENZOIN TINCTURE 40 BAGS/CARTON 4 CARTONS/CASE C1544: Brand: 3M™ STERI-STRIP™

## (undated) DEVICE — NEEDLE HYPO 25GA L1.5IN BVL ORIENTED ECLIPSE

## (undated) DEVICE — GAUZE,SPONGE,2"X2",8PLY,STERILE,LF,2'S: Brand: MEDLINE

## (undated) DEVICE — PENCIL ES L3M BTTN SWCH S STL HEX LOK BLDE ELECTRD HOLSTER

## (undated) DEVICE — SYRINGE MED 10ML LUERLOCK TIP W/O SFTY DISP

## (undated) DEVICE — SYSTEM SMK EVAC LAP TBNG FILTER HSNG BENT STYL PNK SEE CLR

## (undated) DEVICE — SYRINGE MED 30ML STD CLR PLAS LUERLOCK TIP N CTRL DISP

## (undated) DEVICE — INVIEW CLEAR LEGGINGS: Brand: CONVERTORS

## (undated) DEVICE — DRAPE LAP 104X35X124IN BLU CTRL + FAB REINF ABD FEN

## (undated) DEVICE — APPLICATOR LAP 35 CM 2 RIGID VISTASEAL

## (undated) DEVICE — SUTURE VCRL SZ 4-0 L18IN ABSRB UD L19MM PS-2 3/8 CIR PRIM J496H

## (undated) DEVICE — COVER,TABLE,77X90,STERILE: Brand: MEDLINE

## (undated) DEVICE — UNDERPAD INCONT XL MESH PROTCT + DISP FOR MAT USE

## (undated) DEVICE — TOWEL,OR,DSP,ST,BLUE,STD,4/PK,20PK/CS: Brand: MEDLINE

## (undated) DEVICE — ABDOMINAL PAD: Brand: DERMACEA

## (undated) DEVICE — CANISTER, RIGID, 1200CC: Brand: MEDLINE INDUSTRIES, INC.

## (undated) DEVICE — SOLUTION PREP POVIDONE IOD FOR SKIN MUCOUS MEM PRIOR TO

## (undated) DEVICE — GOWN SIRUS NONREIN LG W/TWL: Brand: MEDLINE INDUSTRIES, INC.